# Patient Record
Sex: FEMALE | Race: WHITE | NOT HISPANIC OR LATINO | Employment: PART TIME | URBAN - METROPOLITAN AREA
[De-identification: names, ages, dates, MRNs, and addresses within clinical notes are randomized per-mention and may not be internally consistent; named-entity substitution may affect disease eponyms.]

---

## 2017-04-26 DIAGNOSIS — F41.9 ANXIETY: ICD-10-CM

## 2017-04-26 DIAGNOSIS — F43.23 ADJUSTMENT DISORDER WITH MIXED ANXIETY AND DEPRESSED MOOD: ICD-10-CM

## 2017-04-26 NOTE — TELEPHONE ENCOUNTER
zoloft      Last Written Prescription Date: 11/14/16  Last Fill Quantity: 90, # refills: 1  Last Office Visit with [facility] [place] care provider:  11/14/16        Last PHQ-9 score on record=   PHQ-9 SCORE 11/14/2016   Total Score 0

## 2017-04-27 NOTE — TELEPHONE ENCOUNTER
Need update PHQ-9.     [employer] message sent with questionnaire attached.  Awaiting response    Ann Severino RN

## 2017-04-28 RX ORDER — SERTRALINE HYDROCHLORIDE 25 MG/1
TABLET, FILM COATED ORAL
Qty: 90 TABLET | Refills: 1 | Status: SHIPPED | OUTPATIENT
Start: 2017-04-28 | End: 2017-10-10

## 2017-04-28 NOTE — TELEPHONE ENCOUNTER
PHQ-9 and KALI-7 updated    PHQ-9 SCORE 10/31/2016 11/14/2016 4/27/2017   Total Score - - -   Total Score MyChart - - 0   Total Score 1 0 -     KALI-7 SCORE 10/31/2016 11/14/2016 4/27/2017   Total Score - - -   Total Score - - 1 (minimal anxiety)   Total Score 16 0 -     Refill request approved per [facility] protocol    Ann Severino RN

## 2017-06-01 ENCOUNTER — OFFICE VISIT (OUTPATIENT)
Dept: FAMILY MEDICINE | Facility: CLINIC | Age: 67
End: 2017-06-01
Payer: MEDICARE

## 2017-06-01 VITALS
OXYGEN SATURATION: 100 % | WEIGHT: 136 LBS | DIASTOLIC BLOOD PRESSURE: 68 MMHG | TEMPERATURE: 97.9 F | SYSTOLIC BLOOD PRESSURE: 118 MMHG | BODY MASS INDEX: 25.68 KG/M2 | HEIGHT: 61 IN | HEART RATE: 68 BPM | RESPIRATION RATE: 16 BRPM
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
## 2017-06-01 DIAGNOSIS — M54.50 ACUTE RIGHT-SIDED LOW BACK PAIN WITHOUT SCIATICA: Primary | ICD-10-CM

## 2017-06-01 PROCEDURE — 36415 COLL VENOUS BLD VENIPUNCTURE: CPT | Performed by: FAMILY MEDICINE

## 2017-06-01 PROCEDURE — 99214 OFFICE O/P EST MOD 30 MIN: CPT | Performed by: FAMILY MEDICINE

## 2017-06-01 PROCEDURE — 86618 LYME DISEASE ANTIBODY: CPT | Performed by: FAMILY MEDICINE

## 2017-06-01 RX ORDER — NABUMETONE 500 MG/1
500 TABLET, FILM COATED ORAL 2 TIMES DAILY
Qty: 30 TABLET | Refills: 0 | Status: SHIPPED | OUTPATIENT
Start: 2017-06-01 | End: 2017-08-28

## 2017-06-01 RX ORDER — DOXYCYCLINE 100 MG/1
200 CAPSULE ORAL ONCE
Qty: 2 CAPSULE | Refills: 0 | Status: SHIPPED | OUTPATIENT
Start: 2017-06-01 | End: 2017-06-01
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
## 2017-06-01 NOTE — MR AVS SNAPSHOT
After Visit Summary   6/1/2017    Shelley Mccrary    MRN: [id number]           Patient Information     Date Of Birth          1950        Visit Information        Provider Department      6/1/2017 2:00 PM Fabiola Mahmood MD [facility] [place]        Today's Diagnoses     Acute right-sided low back pain without sciatica    -  1    Tick bite, initial encounter          Care Instructions    Check labs  Take medications as directed.  Cares and symptomatic cares discussed   Follow up if problem or concern             Follow-ups after your visit        Your next 10 appointments already scheduled     Aug 24, 2017  1:00 PM CDT   Ech Complete with [id number]   [facility] CV Echocardiography (Cardiovascular Imaging at [facility])    [address]  [address]  [facility] [id number] [phone]           1.  Please bring or wear a comfortable two-piece outfit. 2.  You may eat, drink and take your normal medicines. 3.  For any questions that cannot be answered, please contact the ordering physician            Aug 28, 2017  9:15 AM CDT   Return Visit with Salty Lovett MD   [facility] AT [place] ([facility])    [address]  [facility] [place] [id number] [phone]              Who to contact     If you have questions or need follow up information about today's clinic visit or your schedule please contact [facility] [place] directly at [phone].  Normal or non-critical lab and imaging results will be communicated to you by MyChart, letter or phone within 4 business days after the clinic has received the results. If you do not hear from us within 7 days, please contact the clinic through MyChart or phone. If you have a critical or abnormal lab result, we will notify you by phone as soon as possible.  Submit refill requests through [employer] or call your pharmacy and they will forward the  "refill request to us. Please allow 3 business days for your refill to be completed.          Additional Information About Your Visit        [employer] Information     [employer] gives you secure access to your electronic health record. If you see a primary care provider, you can also send messages to your care team and make appointments. If you have questions, please call your primary care clinic.  If you do not have a primary care provider, please call [phone] and they will assist you.        Care EveryWhere ID     This is your Care EveryWhere ID. This could be used by other organizations to access your [facility] medical records  [id number]        Your Vitals Were     Pulse Temperature Respirations Height Pulse Oximetry BMI (Body Mass Index)    68 97.9  F (36.6  C) 16 5' 0.75\" (1.543 m) 100% 25.91 kg/m2       Blood Pressure from Last 3 Encounters:   06/01/17 118/68   11/14/16 100/60   10/31/16 124/70    Weight from Last 3 Encounters:   06/01/17 136 lb (61.7 kg)   11/14/16 132 lb 6.4 oz (60.1 kg)   10/31/16 134 lb (60.8 kg)              We Performed the Following     Lyme Disease Elena with reflex to WB Serum          Today's Medication Changes          These changes are accurate as of: 6/1/17  2:33 PM.  If you have any questions, ask your nurse or doctor.               Start taking these medicines.        Dose/Directions    doxycycline 100 MG capsule   Commonly known as:  VIBRAMYCIN   Used for:  Tick bite, initial encounter   Started by:  Fabiola Mahmood MD        Dose:  200 mg   Take 2 capsules (200 mg) by mouth once for 1 dose   Quantity:  2 capsule   Refills:  0       nabumetone 500 MG tablet   Commonly known as:  RELAFEN   Used for:  Acute right-sided low back pain without sciatica   Started by:  Fabiola Mahmood MD        Dose:  500 mg   Take 1 tablet (500 mg) by mouth 2 times daily   Quantity:  30 tablet   Refills:  0         These medicines have changed or have updated prescriptions.        " Dose/Directions    * aspirin 81 MG tablet   This may have changed:  Another medication with the same name was added. Make sure you understand how and when to take each.   Changed by:  Salty Lovett MD        Take by mouth daily   Refills:  0       * aspirin 81 MG tablet   This may have changed:  You were already taking a medication with the same name, and this prescription was added. Make sure you understand how and when to take each.   Changed by:  Fabiola Mahmood MD        Dose:  81 mg   Take 1 tablet (81 mg) by mouth once for 1 dose   Quantity:  1 tablet   Refills:  0       * Notice:  This list has 2 medication(s) that are the same as other medications prescribed for you. Read the directions carefully, and ask your doctor or other care provider to review them with you.         Where to get your medicines      These medications were sent to [id number] - [place], [place] - [address] AT [facility] [address] [facility]  [address] [address], [place] [address]     Phone:  [phone]     doxycycline 100 MG capsule    nabumetone 500 MG tablet         Some of these will need a paper prescription and others can be bought over the counter.  Ask your nurse if you have questions.     You don't need a prescription for these medications     aspirin 81 MG tablet                Primary Care Provider Office Phone # Fax #    Fabiola Mahmood [phone]       [facility] [address] [place] [address] [address]  [place] [address]        Thank you!     Thank you for [facility]  for your care. Our goal is always to provide you with excellent care. Hearing back from our patients is one way we can continue to improve our services. Please take a few minutes to complete the written survey that you may receive in the mail after your visit with us. Thank you!             Your Updated Medication List - Protect others around you: Learn how to  safely use, store and throw away your medicines at www.disposemymeds.org.          This list is accurate as of: 6/1/17  2:33 PM.  Always use your most recent med list.                   Brand Name Dispense Instructions for use    * aspirin 81 MG tablet      Take by mouth daily       * aspirin 81 MG tablet     1 tablet    Take 1 tablet (81 mg) by mouth once for 1 dose       atorvastatin 40 MG tablet    LIPITOR    90 tablet    TAKE 1 TABLET BY MOUTH DAILY       clobetasol 0.05 % cream    TEMOVATE         doxycycline 100 MG capsule    VIBRAMYCIN    2 capsule    Take 2 capsules (200 mg) by mouth once for 1 dose       MELATONIN PO      Take 1 mg by mouth At Bedtime       METOPROLOL SUCCINATE ER PO      Take 25 mg by mouth daily       MULTI-VITAMINS Tabs      Take 1 tablet by mouth       nabumetone 500 MG tablet    RELAFEN    30 tablet    Take 1 tablet (500 mg) by mouth 2 times daily       nitroglycerin 0.4 MG sublingual tablet    NITROSTAT    25 tablet    Place 1 tablet (0.4 mg) under the tongue every 5 minutes as needed for chest pain If you are still having symptoms after 3 doses (15 minutes) call 911.       OMEGA-3 FISH OIL PO      Take 2 g by mouth daily       sertraline 25 MG tablet    ZOLOFT    90 tablet    TAKE 1 TABLET(25 MG) BY MOUTH DAILY       * Notice:  This list has 2 medication(s) that are the same as other medications prescribed for you. Read the directions carefully, and ask your doctor or other care provider to review them with you.

## 2017-06-01 NOTE — LETTER
My Depression Action Plan  Name: Shelley Mccrary   Date of Birth 1950  Date: 6/8/2017    My doctor: Fabiola Mahmood   My clinic: [address] [address]  [phone]          GREEN    ZONE   Good Control    What it looks like:     Things are going generally well. You have normal up s and down s. You may even feel depressed from time to time, but bad moods usually last less than a day.   What you need to do:  1. Continue to care for yourself (see self care plan)  2. Check your depression survival kit and update it as needed  3. Follow your physician s recommendations including any medication.  4. Do not stop taking medication unless you consult with your physician first.           YELLOW         ZONE Getting Worse    What it looks like:     Depression is starting to interfere with your life.     It may be hard to get out of bed; you may be starting to isolate yourself from others.    Symptoms of depression are starting to last most all day and this has happened for several days.     You may have suicidal thoughts but they are not constant.   What you need to do:     1. Call your care team, your response to treatment will improve if you keep your care team informed of your progress. Yellow periods are signs an adjustment may need to be made.     2. Continue your self-care, even if you have to fake it!    3. Talk to someone in your support network    4. Open up your depression survival kit           RED    ZONE Medical Alert - Get Help    What it looks like:     Depression is seriously interfering with your life.     You may experience these or other symptoms: You can t get out of bed most days, can t work or engage in other necessary activities, you have trouble taking care of basic hygiene, or basic responsibilities, thoughts of suicide or death that will not go away, self-injurious behavior.     What you need to do:  1. Call your  care team and request a same-day appointment. If they are not available (weekends or after hours) call your local crisis line, emergency room or 911.      Electronically signed by: Fabiola Mahmood, June 8, 2017    Depression Self Care Plan / Survival Kit    Self-Care for Depression  Here s the deal. Your body and mind are really not as separate as most people think.  What you do and think affects how you feel and how you feel influences what you do and think. This means if you do things that people who feel good do, it will help you feel better.  Sometimes this is all it takes.  There is also a place for medication and therapy depending on how severe your depression is, so be sure to consult with your medical provider and/ or Behavioral Health Consultant if your symptoms are worsening or not improving.     In order to better manage my stress, I will:    Exercise  Get some form of exercise, every day. This will help reduce pain and release endorphins, the  feel good  chemicals in your brain. This is almost as good as taking antidepressants!  This is not the same as joining a gym and then never going! (they count on that by the way ) It can be as simple as just going for a walk or doing some gardening, anything that will get you moving.      Hygiene   Maintain good hygiene (Get out of bed in the morning, Make your bed, Brush your teeth, Take a shower, and Get dressed like you were going to work, even if you are unemployed).  If your clothes don't fit try to get ones that do.    Diet  I will strive to eat foods that are good for me, drink plenty of water, and avoid excessive sugar, caffeine, alcohol, and other mood-altering substances.  Some foods that are helpful in depression are: complex carbohydrates, B vitamins, flaxseed, fish or fish oil, fresh fruits and vegetables.    Psychotherapy  I agree to participate in Individual Therapy (if recommended).    Medication  If prescribed medications, I agree to take them.   Missing doses can result in serious side effects.  I understand that drinking alcohol, or other illicit drug use, may cause potential side effects.  I will not stop my medication abruptly without first discussing it with my provider.    Staying Connected With Others  I will stay in touch with my friends, family members, and my primary care provider/team.    Use your imagination  Be creative.  We all have a creative side; it doesn t matter if it s oil painting, sand castles, or mud pies! This will also kick up the endorphins.    Witness Beauty  (AKA stop and smell the roses) Take a look outside, even in mid-winter. Notice colors, textures. Watch the squirrels and birds.     Service to others  Be of service to others.  There is always someone else in need.  By helping others we can  get out of ourselves  and remember the really important things.  This also provides opportunities for practicing all the other parts of the program.    Humor  Laugh and be silly!  Adjust your TV habits for less news and crime-drama and more comedy.    Control your stress  Try breathing deep, massage therapy, biofeedback, and meditation. Find time to relax each day.     My support system    Clinic Contact:  Phone number:    Contact 1:  Phone number:    Contact 2:  Phone number:    Congregation/:  Phone number:    Therapist:  Phone number:    Local crisis center:    Phone number:    Other community support:  Phone number:

## 2017-06-02 LAB — B BURGDOR IGG+IGM SER QL: 0.09 (ref 0–0.89)

## 2017-06-16 ENCOUNTER — TELEPHONE (OUTPATIENT)
Dept: LAB | Facility: CLINIC | Age: 67
End: 2017-06-16

## 2017-06-16 NOTE — TELEPHONE ENCOUNTER
Call from [facility]. Pt out of town and out of metoprolol XL 25 mg tablet. Gave pharmacy ok for 5 tablets. JUAN ALBERTO Oscar RN

## 2017-08-07 DIAGNOSIS — I25.10 CAD (CORONARY ARTERY DISEASE): ICD-10-CM

## 2017-08-07 DIAGNOSIS — I42.9 CARDIOMYOPATHY, UNSPECIFIED (H): Primary | ICD-10-CM

## 2017-08-07 RX ORDER — METOPROLOL SUCCINATE 25 MG/1
25 TABLET, EXTENDED RELEASE ORAL DAILY
Qty: 90 TABLET | Refills: 0 | Status: SHIPPED | OUTPATIENT
Start: 2017-08-07 | End: 2017-08-28

## 2017-08-24 ENCOUNTER — HOSPITAL ENCOUNTER (OUTPATIENT)
Dept: CARDIOLOGY | Facility: CLINIC | Age: 67
Discharge: HOME | End: 2017-08-24
Attending: INTERNAL MEDICINE | Admitting: INTERNAL MEDICINE
Payer: MEDICARE

## 2017-08-24 DIAGNOSIS — I25.5 ISCHEMIC CARDIOMYOPATHY: ICD-10-CM

## 2017-08-24 DIAGNOSIS — I25.10 CORONARY ARTERY DISEASE INVOLVING NATIVE CORONARY ARTERY OF NATIVE HEART WITHOUT ANGINA PECTORIS: ICD-10-CM

## 2017-08-24 PROCEDURE — 93306 TTE W/DOPPLER COMPLETE: CPT

## 2017-08-24 PROCEDURE — 93306 TTE W/DOPPLER COMPLETE: CPT | Mod: 26 | Performed by: INTERNAL MEDICINE

## 2017-08-28 ENCOUNTER — OFFICE VISIT (OUTPATIENT)
Dept: CARDIOLOGY | Facility: CLINIC | Age: 67
End: 2017-08-28
Attending: INTERNAL MEDICINE
Payer: MEDICARE

## 2017-08-28 VITALS
SYSTOLIC BLOOD PRESSURE: 136 MMHG | DIASTOLIC BLOOD PRESSURE: 64 MMHG | HEART RATE: 86 BPM | HEIGHT: 61 IN | BODY MASS INDEX: 25.86 KG/M2 | WEIGHT: 137 LBS

## 2017-08-28 DIAGNOSIS — I42.9 CARDIOMYOPATHY, UNSPECIFIED (H): ICD-10-CM

## 2017-08-28 DIAGNOSIS — I25.10 CORONARY ARTERY DISEASE INVOLVING NATIVE CORONARY ARTERY OF NATIVE HEART WITHOUT ANGINA PECTORIS: Primary | ICD-10-CM

## 2017-08-28 DIAGNOSIS — I25.5 ISCHEMIC CARDIOMYOPATHY: ICD-10-CM

## 2017-08-28 PROCEDURE — 99214 OFFICE O/P EST MOD 30 MIN: CPT | Performed by: INTERNAL MEDICINE

## 2017-08-28 RX ORDER — METOPROLOL SUCCINATE 25 MG/1
25 TABLET, EXTENDED RELEASE ORAL DAILY
Qty: 90 TABLET | Refills: 3 | Status: SHIPPED | OUTPATIENT
Start: 2017-08-28 | End: 2018-10-23

## 2017-08-28 NOTE — PROGRESS NOTES
HPI and Plan:   I had the pleasure of seeing Shelley Mccrary in Cardiology Clinic in followup.  She is a pleasant 66-year-old female with past medical history of inferolateral myocardial infarction in July 2015 when she was admitted to [facility] and had RCA intervention.  At that time, posterolateral branch with angioplasty was also performed.  She is doing quite well.  She exercises daily.  She also lifts weights.  She is also following a low cholesterol, low-salt diet religiously.  I am quite impressed by her lifestyle modification.          She is on metoprolol XL at 25 daily.   Previously, her blood pressures are low and therefore, we had to stop her lisinopril.  Si She remains on aspirin.       Last lipid profile in November showed an LDL of 60, HDL 53.   HDL is improved compared to prior from 43-53. She is on atorvastatin.     She denies any chest pain or shortness of breath. She is quite active. She jogs in including jogging up the hill. However, she's been quite normal nervous all summer and anxious in anticipation of this visit. She was worried that her echo findings will not be good for her. I reassured her that her echo shows no normal LV systolic function with mild inferior hypokinesis. Compared to prior study, the ejection fraction has improved. I also congratulated her on her ability to exercise regularly and eat healthy. She is relieved.    PHYSICAL EXAMINATION:   Given the anxiety, initial blood pressure was 156 x 85. At the end of the visit, I rechecked her blood pressure improved to 136 x 64. She tells me that when she checks her blood pressure at [facility], the blood pressures are much lower.  CARDIOPULMONARY EXAMINATION:  Unremarkable.       IMPRESSION AND PLAN:   1.  Coronary artery disease, status post inferior wall myocardial infarction in 07/2015.  She is now free of chest pain and shortness of breath. Her echo shows improvement in ejection fraction of 55-60%. She has no evidence of  congestive heart failure.   I refilled her metoprolol today at the same    2. Hyperlipidemia  Improved on atorvastatin 40 mg daily and with lifestyle modification. HDL now improved to the best she's had in years at 53.      She will return to see me on an annual basis.       She will call me if there is worsening chest pain or shortness of breath.       cc:   Fabiola Mahmood MD   [address]  [address]           LAINEY QUISPE MD      Orders Placed This Encounter   Procedures     Follow-Up with Cardiologist       Orders Placed This Encounter   Medications     metoprolol (TOPROL-XL) 25 MG 24 hr tablet     Sig: Take 1 tablet (25 mg) by mouth daily     Dispense:  90 tablet     Refill:  3       Medications Discontinued During This Encounter   Medication Reason     nabumetone (RELAFEN) 500 MG tablet Therapy completed     metoprolol (TOPROL-XL) 25 MG 24 hr tablet Reorder         Encounter Diagnoses   Name Primary?     Coronary artery disease involving native coronary artery of native heart without angina pectoris Yes     Ischemic cardiomyopathy      Cardiomyopathy, unspecified (H)        CURRENT MEDICATIONS:  Current Outpatient Prescriptions   Medication Sig Dispense Refill     metoprolol (TOPROL-XL) 25 MG 24 hr tablet Take 1 tablet (25 mg) by mouth daily 90 tablet 3     sertraline (ZOLOFT) 25 MG tablet TAKE 1 TABLET(25 MG) BY MOUTH DAILY 90 tablet 1     atorvastatin (LIPITOR) 40 MG tablet TAKE 1 TABLET BY MOUTH DAILY 90 tablet 3     Omega-3 Fatty Acids (OMEGA-3 FISH OIL PO) Take 2 g by mouth daily       MELATONIN PO Take 1 mg by mouth At Bedtime       Multiple Vitamin (MULTI-VITAMINS) TABS Take 1 tablet by mouth       clobetasol (TEMOVATE) 0.05 % cream   2     aspirin 81 MG tablet Take by mouth daily       nitroglycerin (NITROSTAT) 0.4 MG SL tablet Place 1 tablet (0.4 mg) under the tongue every 5 minutes as needed for chest pain If you are still having symptoms  after 3 doses (15 minutes) call 911. 25 tablet 3     [DISCONTINUED] metoprolol (TOPROL-XL) 25 MG 24 hr tablet Take 1 tablet (25 mg) by mouth daily 90 tablet 0       ALLERGIES     Allergies   Allergen Reactions     Hmg-Coa-R Inhibitors Other (See Comments)     elevated liver enzymes       PAST MEDICAL HISTORY:  Past Medical History:   Diagnosis Date     Anxiety     post MI     CAD (coronary artery disease) 7/29/2015     Depression     post MI     MRSA infection     hand      Myocardial infarction (H) 7/2015     Vertigo     related miners        PAST SURGICAL HISTORY:  Past Surgical History:   Procedure Laterality Date     ANGIOPLASTY  7/17/2015    MAURISIO to mid circ, thrombectomy -MAURISIO to proximal, mid, distal RCA , successful balloon to 1st RPL done Resolute stents used procedure done at [facility]     INNER EAR SURGERY      redesign merritt faye of miners ismael        FAMILY HISTORY:  Family History   Problem Relation Age of Onset     Coronary Artery Disease Father      at age 39, and other heart problem      Hyperlipidemia Father      ?      Hypertension Mother      DIABETES No family hx of      CEREBROVASCULAR DISEASE No family hx of      Breast Cancer No family hx of      Colon Cancer No family hx of        SOCIAL HISTORY:  Social History     Social History     Marital status:      Spouse name: N/A     Number of children: N/A     Years of education: N/A     Social History Main Topics     Smoking status: Former Smoker     Types: Cigarettes     Quit date: 1/1/1990     Smokeless tobacco: Never Used      Comment: smoked 1-2 pack week, 30 years     Alcohol use No     Drug use: No     Sexual activity: No     Other Topics Concern     Parent/Sibling W/ Cabg, Mi Or Angioplasty Before 65f 55m? Yes     Caffeine Concern No     Sleep Concern No     Weight Concern No     Special Diet Yes     low fats     Exercise Yes     walking  miles day, swimming, ellyptical     Seat Belt Yes     Social History Narrative     ", 2 kids, non smoker quit 22 yrs ago. No alcohol social occasional , works for a company to help elderly patient        Review of Systems:  Skin:  Positive for bruising     Eyes:  Positive for glasses    ENT:  Negative      Respiratory:  Negative       Cardiovascular:  Negative      Gastroenterology: Negative      Genitourinary:  Positive for nocturia    Musculoskeletal:  Negative      Neurologic:  Positive for numbness or tingling of hands    Psychiatric:  Negative      Heme/Lymph/Imm:  Positive for easy bruising    Endocrine:  Negative        Physical Exam:  Vitals: /64  Pulse 86  Ht 1.543 m (5' 0.75\")  Wt 62.1 kg (137 lb)  BMI 26.1 kg/m2    Constitutional:  cooperative;alert and oriented        Skin:  warm and dry to the touch        Head:  no masses or lesions        Eyes:  pupils equal and round        ENT:           Neck:  carotid pulses are full and equal bilaterally        Chest:  clear to auscultation          Cardiac: normal S1 and S2     no presence of murmur            Abdomen:  no masses;BS normoactive        Vascular: not assessed this visit                                        Extremities and Back:  no edema              Neurological:  no gross motor deficits                Salty Lovett MD  [address]  [place] [id number]              "

## 2017-08-28 NOTE — MR AVS SNAPSHOT
After Visit Summary   8/28/2017    Shelley Mccrary    MRN: [id number]           Patient Information     Date Of Birth          1950        Visit Information        Provider Department      8/28/2017 9:15 AM Salty Lovett MD [facility] [place]        Today's Diagnoses     Coronary artery disease involving native coronary artery of native heart without angina pectoris    -  1    Ischemic cardiomyopathy        Cardiomyopathy, unspecified (H)           Follow-ups after your visit        Additional Services     Follow-Up with Cardiologist                 Future tests that were ordered for you today     Open Future Orders        Priority Expected Expires Ordered    Follow-Up with Cardiologist Routine 8/28/2018 8/29/2018 8/28/2017            Who to contact     If you have questions or need follow up information about today's clinic visit or your schedule please contact [facility] directly at [phone].  Normal or non-critical lab and imaging results will be communicated to you by MyChart, letter or phone within 4 business days after the clinic has received the results. If you do not hear from us within 7 days, please contact the clinic through Your Tribute[facility] or phone. If you have a critical or abnormal lab result, we will notify you by phone as soon as possible.  Submit refill requests through oNoise or call your pharmacy and they will forward the refill request to us. Please allow 3 business days for your refill to be completed.          Additional Information About Your Visit        MyChart Information     oNoise gives you secure access to your electronic health record. If you see a primary care provider, you can also send messages to your care team and make appointments. If you have questions, please call your primary care clinic.  If you do not have a primary care provider, please call [phone] and they will  "assist you.        Care EveryWhere ID     This is your Care EveryWhere ID. This could be used by other organizations to access your [place] [place] medical records  [id number]        Your Vitals Were     Pulse Height BMI (Body Mass Index)             86 1.543 m (5' 0.75\") 26.1 kg/m2          Blood Pressure from Last 3 Encounters:   08/28/17 136/64   06/01/17 118/68   11/14/16 100/60    Weight from Last 3 Encounters:   08/28/17 62.1 kg (137 lb)   06/01/17 61.7 kg (136 lb)   11/14/16 60.1 kg (132 lb 6.4 oz)              We Performed the Following     Follow-Up with Cardiologist          Where to get your medicines      These medications were sent to [id number] - [place], [place] - [address] AT [facility] OF [facility] [address]  [address] [facility], [place] [address]     Phone:  [phone]     metoprolol 25 MG 24 hr tablet          Primary Care Provider Office Phone # Fax #    Fabiola Mahmood [phone] [address]  [place] [address]        Equal Access to Services     VAIBHAV GRIJALVA AH: Hadii aad ku hadasho Soomaali, waaxda luqadaha, qaybta kaalmada adeegyada, waxay idiin hayjanetn abrahan khagustín laselene ah. So [facility] [phone].    ATENCIÓN: Si habla español, tiene a hylton disposición servicios gratuitos de asistencia lingüística. Llame al [phone].    We comply with applicable federal civil rights laws and [place] laws. We do not discriminate on the basis of race, color, national origin, age, disability sex, sexual orientation or gender identity.            Thank you!     Thank you for choosing [facility]  for your care. Our goal is always to provide you with excellent care. Hearing back from our patients is one way we can continue to improve our services. Please take a few minutes to complete the written survey that you may receive in the mail after your visit with us. Thank you!             Your Updated " Medication List - Protect others around you: Learn how to safely use, store and throw away your medicines at www.disposemymeds.org.          This list is accurate as of: 8/28/17  9:46 AM.  Always use your most recent med list.                   Brand Name Dispense Instructions for use Diagnosis    aspirin 81 MG tablet      Take by mouth daily        atorvastatin 40 MG tablet    LIPITOR    90 tablet    TAKE 1 TABLET BY MOUTH DAILY    Hyperlipidemia LDL goal <70       clobetasol 0.05 % cream    TEMOVATE          MELATONIN PO      Take 1 mg by mouth At Bedtime        metoprolol 25 MG 24 hr tablet    TOPROL-XL    90 tablet    Take 1 tablet (25 mg) by mouth daily    Cardiomyopathy, unspecified (H)       MULTI-VITAMINS Tabs      Take 1 tablet by mouth        nitroGLYcerin 0.4 MG sublingual tablet    NITROSTAT    25 tablet    Place 1 tablet (0.4 mg) under the tongue every 5 minutes as needed for chest pain If you are still having symptoms after 3 doses (15 minutes) call 911.    CAD (coronary artery disease), S/P angioplasty with stent       OMEGA-3 FISH OIL PO      Take 2 g by mouth daily        sertraline 25 MG tablet    ZOLOFT    90 tablet    TAKE 1 TABLET(25 MG) BY MOUTH DAILY    Anxiety, Adjustment disorder with mixed anxiety and depressed mood

## 2017-08-28 NOTE — LETTER
8/28/2017    Fabiola Mahmood MD  [address]  [place] [address]    RE: Shelley Mccrary       Dear Colleague,    I had the pleasure of seeing Shelley Mccrary in the [facility].    HPI and Plan:   I had the pleasure of seeing Shelley Mccrary in Cardiology Clinic in followup.  She is a pleasant 66-year-old female with past medical history of inferolateral myocardial infarction in July 2015 when she was admitted to [facility] and had RCA intervention.  At that time, posterolateral branch with angioplasty was also performed.  She is doing quite well.  She exercises daily.  She also lifts weights.  She is also following a low cholesterol, low-salt diet religiously.  I am quite impressed by her lifestyle modification.          She is on metoprolol XL at 25 daily.   Previously, her blood pressures are low and therefore, we had to stop her lisinopril.  Si She remains on aspirin.       Last lipid profile in November showed an LDL of 60, HDL 53.    HDL is improved compared to prior from 43-53. She is on atorvastatin.     She denies any chest pain or shortness of breath. She is quite active. She jogs in including jogging up the hill. However, she's been quite normal nervous all summer and anxious in anticipation of this visit. She was worried that her echo findings will not be good for her. I reassured her that her echo shows no normal LV systolic function with mild inferior hypokinesis. Compared to prior study, the ejection fraction has improved. I also congratulated her on her ability to exercise regularly and eat healthy. She is relieved.    PHYSICAL EXAMINATION:   Given the anxiety, initial blood pressure was 156 x 85. At the end of the visit, I rechecked her blood pressure improved to 136 x 64. She tells me that when she checks her blood pressure at [facility], the blood pressures are much lower.  CARDIOPULMONARY EXAMINATION:  Unremarkable.       IMPRESSION AND PLAN:    1.  Coronary artery disease, status post inferior wall myocardial infarction in 07/2015.  She is now free of chest pain and shortness of breath. Her echo shows improvement in ejection fraction of 55-60%. She has no evidence of congestive heart failure.    I refilled her metoprolol today at the same    2. Hyperlipidemia  Improved on atorvastatin 40 mg daily and with lifestyle modification. HDL now improved to the best she's had in years at 53.      She will return to see me on an annual basis.       She will call me if there is worsening chest pain or shortness of breath.       cc:   Fabiola Mahmood MD   [address]  [address]           LAINEY QUISPE MD      Orders Placed This Encounter   Procedures     Follow-Up with Cardiologist       Orders Placed This Encounter   Medications     metoprolol (TOPROL-XL) 25 MG 24 hr tablet     Sig: Take 1 tablet (25 mg) by mouth daily     Dispense:  90 tablet     Refill:  3       Medications Discontinued During This Encounter   Medication Reason     nabumetone (RELAFEN) 500 MG tablet Therapy completed     metoprolol (TOPROL-XL) 25 MG 24 hr tablet Reorder         Encounter Diagnoses   Name Primary?     Coronary artery disease involving native coronary artery of native heart without angina pectoris Yes     Ischemic cardiomyopathy      Cardiomyopathy, unspecified (H)        CURRENT MEDICATIONS:  Current Outpatient Prescriptions   Medication Sig Dispense Refill     metoprolol (TOPROL-XL) 25 MG 24 hr tablet Take 1 tablet (25 mg) by mouth daily 90 tablet 3     sertraline (ZOLOFT) 25 MG tablet TAKE 1 TABLET(25 MG) BY MOUTH DAILY 90 tablet 1     atorvastatin (LIPITOR) 40 MG tablet TAKE 1 TABLET BY MOUTH DAILY 90 tablet 3     Omega-3 Fatty Acids (OMEGA-3 FISH OIL PO) Take 2 g by mouth daily       MELATONIN PO Take 1 mg by mouth At Bedtime       Multiple Vitamin (MULTI-VITAMINS) TABS Take 1 tablet by mouth       clobetasol  (TEMOVATE) 0.05 % cream   2     aspirin 81 MG tablet Take by mouth daily       nitroglycerin (NITROSTAT) 0.4 MG SL tablet Place 1 tablet (0.4 mg) under the tongue every 5 minutes as needed for chest pain If you are still having symptoms after 3 doses (15 minutes) call 911. 25 tablet 3     [DISCONTINUED] metoprolol (TOPROL-XL) 25 MG 24 hr tablet Take 1 tablet (25 mg) by mouth daily 90 tablet 0       ALLERGIES     Allergies   Allergen Reactions     Hmg-Coa-R Inhibitors Other (See Comments)     elevated liver enzymes       PAST MEDICAL HISTORY:  Past Medical History:   Diagnosis Date     Anxiety     post MI     CAD (coronary artery disease) 7/29/2015     Depression     post MI     MRSA infection     hand      Myocardial infarction (H) 7/2015     Vertigo     related miners        PAST SURGICAL HISTORY:  Past Surgical History:   Procedure Laterality Date     ANGIOPLASTY  7/17/2015    MAURISIO to mid circ, thrombectomy -MAURISIO to proximal, mid, distal RCA , successful balloon to 1st RPL done Resolute stents used procedure done at [facility]     INNER EAR SURGERY      redesign roxann nieto bc of miners diseas        FAMILY HISTORY:  Family History   Problem Relation Age of Onset     Coronary Artery Disease Father      at age 39, and other heart problem      Hyperlipidemia Father      ?      Hypertension Mother      DIABETES No family hx of      CEREBROVASCULAR DISEASE No family hx of      Breast Cancer No family hx of      Colon Cancer No family hx of        SOCIAL HISTORY:  Social History     Social History     Marital status:      Spouse name: N/A     Number of children: N/A     Years of education: N/A     Social History Main Topics     Smoking status: Former Smoker     Types: Cigarettes     Quit date: 1/1/1990     Smokeless tobacco: Never Used      Comment: smoked 1-2 pack week, 30 years     Alcohol use No     Drug use: No     Sexual activity: No     Other Topics Concern     Parent/Sibling W/ Cabg, Mi Or Angioplasty  "Before 65f 55m? Yes     Caffeine Concern No     Sleep Concern No     Weight Concern No     Special Diet Yes     low fats     Exercise Yes     walking  miles day, swimming, ellyptical     Seat Belt Yes     Social History Narrative    , 2 kids, non smoker quit 22 yrs ago. No alcohol social occasional , works for a company to help elderly patient        Review of Systems:  Skin:  Positive for bruising     Eyes:  Positive for glasses    ENT:  Negative      Respiratory:  Negative       Cardiovascular:  Negative      Gastroenterology: Negative      Genitourinary:  Positive for nocturia    Musculoskeletal:  Negative      Neurologic:  Positive for numbness or tingling of hands    Psychiatric:  Negative      Heme/Lymph/Imm:  Positive for easy bruising    Endocrine:  Negative        Physical Exam:  Vitals: /64  Pulse 86  Ht 1.543 m (5' 0.75\")  Wt 62.1 kg (137 lb)  BMI 26.1 kg/m2    Constitutional:  cooperative;alert and oriented        Skin:  warm and dry to the touch        Head:  no masses or lesions        Eyes:  pupils equal and round        ENT:           Neck:  carotid pulses are full and equal bilaterally        Chest:  clear to auscultation          Cardiac: normal S1 and S2     no presence of murmur            Abdomen:  no masses;BS normoactive        Vascular: not assessed this visit                                        Extremities and Back:  no edema              Neurological:  no gross motor deficits        Thank you for allowing me to participate in the care of your patient.    Sincerely,     Salty Lovett MD     [facility]      "

## 2017-10-10 ENCOUNTER — OFFICE VISIT (OUTPATIENT)
Dept: FAMILY MEDICINE | Facility: CLINIC | Age: 67
End: 2017-10-10
Payer: MEDICARE

## 2017-10-10 VITALS
DIASTOLIC BLOOD PRESSURE: 77 MMHG | TEMPERATURE: 97.5 F | HEART RATE: 67 BPM | WEIGHT: 137 LBS | OXYGEN SATURATION: 9 % | BODY MASS INDEX: 25.86 KG/M2 | HEIGHT: 61 IN | SYSTOLIC BLOOD PRESSURE: 118 MMHG

## 2017-10-10 DIAGNOSIS — F43.23 ADJUSTMENT DISORDER WITH MIXED ANXIETY AND DEPRESSED MOOD: ICD-10-CM

## 2017-10-10 DIAGNOSIS — E78.5 HYPERLIPIDEMIA LDL GOAL <70: ICD-10-CM

## 2017-10-10 DIAGNOSIS — M79.672 LEFT FOOT PAIN: ICD-10-CM

## 2017-10-10 DIAGNOSIS — L98.9 SKIN LESION: ICD-10-CM

## 2017-10-10 DIAGNOSIS — Z23 NEED FOR PROPHYLACTIC VACCINATION AND INOCULATION AGAINST INFLUENZA: ICD-10-CM

## 2017-10-10 DIAGNOSIS — M54.50 MIDLINE LOW BACK PAIN WITHOUT SCIATICA, UNSPECIFIED CHRONICITY: Primary | ICD-10-CM

## 2017-10-10 PROCEDURE — 90662 IIV NO PRSV INCREASED AG IM: CPT | Performed by: FAMILY MEDICINE

## 2017-10-10 PROCEDURE — 80061 LIPID PANEL: CPT | Performed by: FAMILY MEDICINE

## 2017-10-10 PROCEDURE — 99214 OFFICE O/P EST MOD 30 MIN: CPT | Mod: 25 | Performed by: FAMILY MEDICINE

## 2017-10-10 PROCEDURE — 80053 COMPREHEN METABOLIC PANEL: CPT | Performed by: FAMILY MEDICINE

## 2017-10-10 PROCEDURE — G0008 ADMIN INFLUENZA VIRUS VAC: HCPCS | Performed by: FAMILY MEDICINE

## 2017-10-10 PROCEDURE — 36415 COLL VENOUS BLD VENIPUNCTURE: CPT | Performed by: FAMILY MEDICINE

## 2017-10-10 RX ORDER — NABUMETONE 500 MG/1
500-1000 TABLET, FILM COATED ORAL 2 TIMES DAILY PRN
Qty: 30 TABLET | Refills: 0 | Status: SHIPPED | OUTPATIENT
Start: 2017-10-10 | End: 2017-12-07

## 2017-10-10 RX ORDER — SERTRALINE HYDROCHLORIDE 25 MG/1
25 TABLET, FILM COATED ORAL DAILY
Qty: 90 TABLET | Refills: 0 | Status: SHIPPED | OUTPATIENT
Start: 2017-10-10 | End: 2018-02-03

## 2017-10-10 NOTE — PROGRESS NOTES
"  SUBJECTIVE:   Shelley Mccrary is a 67 year old female who presents to clinic today for the following health issues:      Back Pain       Duration: recheck from OV 6/1/17      Specific cause: since last yr. She was better , although it can flare up on and off, lately it is sort of bothering her     Description:   Location of pain: low back right  Character of pain: burning, annoying achy and holds her back although not sharp pain   Pain radiation:radiates into the right buttocks, radiates into the right leg and thigh , sometimes has on left too ,hip feels tight , no radiating pain below . Legs week and achy .New numbness or weakness in legs, not attributed to pain:  no     Intensity: , mild, annoying     History:   Pain interferes with job: YES,   History of back problems: no prior back problems  Any previous MRI or X-rays: None  Sees a specialist for back pain:  No  Therapies tried without relief:     Alleviating factors:   Improved by:       Precipitating factors:  Worsened by: Nothing    PROBLEMS TO ADD ON...            Also  pain on the  left foot  .  Has ongoing left foot pain on and off since last yr , no recall of injury although sometimes she is  down on her feet and on ground,  doing lot of cleaning etc,  so she thinsk she may strained it  from doing some cleaning last week                  Has spot on her chest since summer, feels tender,\" like pimple that never goes away and does not heal\"sometimes it looks slightly red.                    She is also fasting today so wants labs                 She is doing quiet well mood wise and debating If she can go off Zoloft now. Had seen cardiologist and she was told her heart is really good, so she thinks taht also has  reduced her anxiety now . No problem taking med's       Problem list and histories reviewed & adjusted, as indicated.  Additional history: as documented    Patient Active Problem List   Diagnosis     CAD (coronary artery disease)     " "Hyperlipidemia LDL goal <70     Disturbance of skin sensation     Elevated LFTs     Adjustment disorder with mixed anxiety and depressed mood     Myocardial infarction     Ischemic cardiomyopathy     Palpitations     Acute myocardial infarction of other inferior wall, initial episode of care     Anxiety     Coronary artery disease involving native coronary artery of native heart without angina pectoris     Tinnitus, bilateral     Gastroesophageal reflux disease, esophagitis presence not specified     Cardiomyopathy, unspecified     Past Surgical History:   Procedure Laterality Date     ANGIOPLASTY  7/17/2015    MAURISIO to mid circ, thrombectomy -MAURISIO to proximal, mid, distal RCA , successful balloon to 1st RPL done Resolute stents used procedure done at [facility]     INNER EAR SURGERY      redesign roxann nieto, bc of miners diseas        Social History   Substance Use Topics     Smoking status: Former Smoker     Types: Cigarettes     Quit date: 1/1/1990     Smokeless tobacco: Never Used      Comment: smoked 1-2 pack week, 30 years     Alcohol use No     Family History   Problem Relation Age of Onset     Coronary Artery Disease Father      at age 39, and other heart problem      Hyperlipidemia Father      ?      Hypertension Mother      DIABETES No family hx of      CEREBROVASCULAR DISEASE No family hx of      Breast Cancer No family hx of      Colon Cancer No family hx of              Reviewed and updated as needed this visit by clinical staff       Reviewed and updated as needed this visit by Provider         ROS:  Constitutional, HEENT, cardiovascular, pulmonary, GI, , musculoskeletal, neuro, skin, endocrine and psych systems are negative, except as otherwise noted.      OBJECTIVE:   /77  Pulse 67  Temp 97.5  F (36.4  C) (Tympanic)  Ht 5' 0.75\" (1.543 m)  Wt 137 lb (62.1 kg)  SpO2 (!) 9%  BMI 26.1 kg/m2  Body mass index is 26.1 kg/(m^2).  GENERAL: healthy, alert and no distress  RESP: lungs clear to " auscultation - no rales, rhonchi or wheezes  CV: regular rate and rhythm, normal S1 S2, no S3 or S4,   ABDOMEN: soft, nontender, no hepatosplenomegaly, no masses and bowel sounds normal  MS: back with slight decrease ROM, tender mostly in para lumber and gluteal area , bilateral  more so on left , ROM of left hip is ok . MS, BSRL is ok.   Left foot with no swelling or erythema but has localized tenderness mid foot a little above medial arch   SKIN: upper chest wall has rough erythematous 4-5  mm rough slightly raised spot , feels slightly tender   PSYCH: mentation appears normal, affect normal/bright      ASSESSMENT/PLAN:         (M54.5) Midline low back pain without sciatica, unspecified chronicity  (primary encounter diagnosis)  Comment:   Plan: [facility] PT, HAND, AND CHIROPRACTIC REFERRAL,         nabumetone (RELAFEN) 500 MG tablet             discussed back  cares and symptomatic treatment including  adequate pain control, heat,  stretches etc.    she will do follow up if no improvement or problem after PT . Consider further evaluation  if needed.       (M79.672) Left foot pain  Comment:   Plan: nabumetone (RELAFEN) 500 MG tablet            (E78.5) Hyperlipidemia LDL goal <70  Comment:   Plan: Comprehensive metabolic panel, Lipid panel         reflex to direct LDL              (F43.23) Adjustment disorder with mixed anxiety and depressed mood  Comment: doing well., debating to go off, she will consider slow taper if needed   Plan: sertraline (ZOLOFT) 25 MG tablet          (L98.9) Skin lesion  Comment: non healing skin lesion on chest wall   Plan: SKIN CARE REFERRAL              (Z23) Need for prophylactic vaccination and inoculation against influenza  Comment:   Plan: FLU VACCINE, INCREASED ANTIGEN, PRESV FREE, AGE        65+ [36507], Vaccine Administration, Initial         [40101]            Patient expressed understanding and agreement with treatment plan. All patient's questions were answered, will let me know if  has more later.  Medications: Rx's: Reviewed the potential side effects/complications of medications prescribed.       Fabiola Mahmood MD  [facility]

## 2017-10-10 NOTE — PATIENT INSTRUCTIONS
Take medications as directed.  Check labs   Cares and symptomatic cares discussed   Follow up if problem or concern

## 2017-10-10 NOTE — PROGRESS NOTES
Injectable Influenza Immunization Documentation    1.  Is the person to be vaccinated sick today?   No    2. Does the person to be vaccinated have an allergy to a component   of the vaccine?   No    3. Has the person to be vaccinated ever had a serious reaction   to influenza vaccine in the past?   No    4. Has the person to be vaccinated ever had Guillain-Barré syndrome?   No    Form completed by

## 2017-10-10 NOTE — MR AVS SNAPSHOT
After Visit Summary   10/10/2017    Shelley Mccrary    MRN: [id number]           Patient Information     Date Of Birth          1950        Visit Information        Provider Department      10/10/2017 10:40 AM Fabiola Mahmood MD [facility] [place]        Today's Diagnoses     Midline low back pain without sciatica, unspecified chronicity    -  1    Anxiety        Adjustment disorder with mixed anxiety and depressed mood        Left foot pain        Hyperlipidemia LDL goal <70          Care Instructions    Take medications as directed.  Check labs   Cares and symptomatic cares discussed   Follow up if problem or concern             Follow-ups after your visit        Additional Services     [facility] PT, HAND, AND CHIROPRACTIC REFERRAL       **This order will print in the [facility] Scheduling Office**    Physical Therapy, Hand Therapy and Chiropractic Care are available through:    *[facility]  *[facility]  *[facility]    Call one number to schedule at any of the above locations: [phone].    Your provider has referred you to: Physical Therapy at [facility] or [facility]    Indication/Reason for Referral: Low Back Pain  Onset of Illness: ongoing recurrent pain since last year   Therapy Orders: Evaluate and Treat  Special Programs:   Special Request:     Matthias Rizzo      Additional Comments for the Therapist or Chiropractor:     Please be aware that coverage of these services is subject to the terms and limitations of your health insurance plan.  Call member services at your health plan with any benefit or coverage questions.      Please bring the following to your appointment:    *Your personal calendar for scheduling future appointments  *Comfortable clothing                  Who to contact     If you have questions or need follow up information about today's clinic visit or your schedule please contact [facility] [place]  "directly at [phone].  Normal or non-critical lab and imaging results will be communicated to you by Entradahart, letter or phone within 4 business days after the clinic has received the results. If you do not hear from us within 7 days, please contact the clinic through [email or web address] or phone. If you have a critical or abnormal lab result, we will notify you by phone as soon as possible.  Submit refill requests through [employer] or call your pharmacy and they will forward the refill request to us. Please allow 3 business days for your refill to be completed.          Additional Information About Your Visit        [employer] Information     [employer] gives you secure access to your electronic health record. If you see a primary care provider, you can also send messages to your care team and make appointments. If you have questions, please call your primary care clinic.  If you do not have a primary care provider, please call [phone] and they will assist you.        Care EveryWhere ID     This is your Care EveryWhere ID. This could be used by other organizations to access your [facility] medical records  [id number]        Your Vitals Were     Pulse Temperature Height Pulse Oximetry BMI (Body Mass Index)       67 97.5  F (36.4  C) (Tympanic) 5' 0.75\" (1.543 m) 9% 26.1 kg/m2        Blood Pressure from Last 3 Encounters:   10/10/17 118/77   08/28/17 136/64   06/01/17 118/68    Weight from Last 3 Encounters:   10/10/17 137 lb (62.1 kg)   08/28/17 137 lb (62.1 kg)   06/01/17 136 lb (61.7 kg)              We Performed the Following     Comprehensive metabolic panel     [facility] PT, HAND, AND CHIROPRACTIC REFERRAL     Lipid panel reflex to direct LDL          Today's Medication Changes          These changes are accurate as of: 10/10/17 11:22 AM.  If you have any questions, ask your nurse or doctor.               Start taking these medicines.        Dose/Directions    nabumetone 500 MG tablet   Commonly known as:  RELAFEN   Used for:  " Midline low back pain without sciatica, unspecified chronicity, Left foot pain   Started by:  Fabiola Mahmood MD        Dose:  500-1000 mg   Take 1-2 tablets (500-1,000 mg) by mouth 2 times daily as needed for moderate pain   Quantity:  30 tablet   Refills:  0         These medicines have changed or have updated prescriptions.        Dose/Directions    sertraline 25 MG tablet   Commonly known as:  ZOLOFT   This may have changed:  See the new instructions.   Used for:  Anxiety, Adjustment disorder with mixed anxiety and depressed mood   Changed by:  Fabiola Mahmood MD        Dose:  25 mg   Take 1 tablet (25 mg) by mouth daily   Quantity:  90 tablet   Refills:  0            Where to get your medicines      These medications were sent to [facility] - [place] - [address] AT [facility] [address]  [address] [facility] [place], [place] [address]     Phone:  [phone]     sertraline 25 MG tablet         Some of these will need a paper prescription and others can be bought over the counter.  Ask your nurse if you have questions.     Bring a paper prescription for each of these medications     nabumetone 500 MG tablet                Primary Care Provider Office Phone # Fax #    Fabiola Mahmood [phone]       [address]  [place] [address]        Equal Access to Services     ROMI GRIJALVA AH: Hadii steve ku hadasho Soomaali, waaxda luqadaha, qaybta kaalmada adeegyada, shawna horton haychad clarke . So [facility] [phone].    ATENCIÓN: Si habla español, tiene a hylton disposición servicios gratuitos de asistencia lingüística. Destinee al [phone].    We comply with applicable federal civil rights laws and [place] laws. We do not discriminate on the basis of race, color, national origin, age, disability, sex, sexual orientation, or gender identity.            Thank you!     Thank you for choosing [facility] [place]  for your care. Our goal is always to provide you with excellent care. Hearing back from our patients is one way we can continue to improve our services. Please take a few minutes to complete the written survey that you may receive in the mail after your visit with us. Thank you!             Your Updated Medication List - Protect others around you: Learn how to safely use, store and throw away your medicines at www.disposemymeds.org.          This list is accurate as of: 10/10/17 11:22 AM.  Always use your most recent med list.                   Brand Name Dispense Instructions for use Diagnosis    aspirin 81 MG tablet      Take by mouth daily        atorvastatin 40 MG tablet    LIPITOR    90 tablet    TAKE 1 TABLET BY MOUTH DAILY    Hyperlipidemia LDL goal <70       clobetasol 0.05 % cream    TEMOVATE          MELATONIN PO      Take 1 mg by mouth At Bedtime        metoprolol 25 MG 24 hr tablet    TOPROL-XL    90 tablet    Take 1 tablet (25 mg) by mouth daily    Cardiomyopathy, unspecified       MULTI-VITAMINS Tabs      Take 1 tablet by mouth        nabumetone 500 MG tablet    RELAFEN    30 tablet    Take 1-2 tablets (500-1,000 mg) by mouth 2 times daily as needed for moderate pain    Midline low back pain without sciatica, unspecified chronicity, Left foot pain       nitroGLYcerin 0.4 MG sublingual tablet    NITROSTAT    25 tablet    Place 1 tablet (0.4 mg) under the tongue every 5 minutes as needed for chest pain If you are still having symptoms after 3 doses (15 minutes) call 911.    CAD (coronary artery disease), S/P angioplasty with stent       OMEGA-3 FISH OIL PO      Take 2 g by mouth daily        sertraline 25 MG tablet    ZOLOFT    90 tablet    Take 1 tablet (25 mg) by mouth daily    Anxiety, Adjustment disorder with mixed anxiety and depressed mood

## 2017-10-10 NOTE — NURSING NOTE
"Chief Complaint   Patient presents with     Back Pain     f/u        Initial /77  Pulse 67  Temp 97.5  F (36.4  C) (Tympanic)  Ht 5' 0.75\" (1.543 m)  Wt 137 lb (62.1 kg)  SpO2 (!) 9%  BMI 26.1 kg/m2 Estimated body mass index is 26.1 kg/(m^2) as calculated from the following:    Height as of this encounter: 5' 0.75\" (1.543 m).    Weight as of this encounter: 137 lb (62.1 kg).  Medication Reconciliation: complete  "

## 2017-10-11 LAB
ALBUMIN SERPL-MCNC: 3.7 G/DL (ref 3.4–5)
ALP SERPL-CCNC: 70 U/L (ref 40–150)
ALT SERPL W P-5'-P-CCNC: 32 U/L (ref 0–50)
ANION GAP SERPL CALCULATED.3IONS-SCNC: 8 MMOL/L (ref 3–14)
AST SERPL W P-5'-P-CCNC: 28 U/L (ref 0–45)
BILIRUB SERPL-MCNC: 0.5 MG/DL (ref 0.2–1.3)
BUN SERPL-MCNC: 17 MG/DL (ref 7–30)
CALCIUM SERPL-MCNC: 9 MG/DL (ref 8.5–10.1)
CHLORIDE SERPL-SCNC: 105 MMOL/L (ref 94–109)
CHOLEST SERPL-MCNC: 126 MG/DL
CO2 SERPL-SCNC: 27 MMOL/L (ref 20–32)
CREAT SERPL-MCNC: 0.83 MG/DL (ref 0.52–1.04)
GFR SERPL CREATININE-BSD FRML MDRD: 68 ML/MIN/1.7M2
GLUCOSE SERPL-MCNC: 84 MG/DL (ref 70–99)
HDLC SERPL-MCNC: 61 MG/DL
LDLC SERPL CALC-MCNC: 56 MG/DL
NONHDLC SERPL-MCNC: 65 MG/DL
POTASSIUM SERPL-SCNC: 4.2 MMOL/L (ref 3.4–5.3)
PROT SERPL-MCNC: 7 G/DL (ref 6.8–8.8)
SODIUM SERPL-SCNC: 140 MMOL/L (ref 133–144)
TRIGL SERPL-MCNC: 45 MG/DL

## 2017-10-16 ENCOUNTER — TELEPHONE (OUTPATIENT)
Dept: FAMILY MEDICINE | Facility: CLINIC | Age: 67
End: 2017-10-16

## 2017-10-16 ENCOUNTER — THERAPY VISIT (OUTPATIENT)
Dept: PHYSICAL THERAPY | Facility: CLINIC | Age: 67
End: 2017-10-16
Payer: MEDICARE

## 2017-10-16 DIAGNOSIS — M54.50 MIDLINE LOW BACK PAIN WITHOUT SCIATICA: Primary | ICD-10-CM

## 2017-10-16 PROCEDURE — 97110 THERAPEUTIC EXERCISES: CPT | Mod: GP | Performed by: PHYSICAL THERAPIST

## 2017-10-16 PROCEDURE — 97161 PT EVAL LOW COMPLEX 20 MIN: CPT | Mod: GP | Performed by: PHYSICAL THERAPIST

## 2017-10-16 PROCEDURE — 97112 NEUROMUSCULAR REEDUCATION: CPT | Mod: GP | Performed by: PHYSICAL THERAPIST

## 2017-10-16 PROCEDURE — G8981 BODY POS CURRENT STATUS: HCPCS | Mod: GP | Performed by: PHYSICAL THERAPIST

## 2017-10-16 PROCEDURE — G8982 BODY POS GOAL STATUS: HCPCS | Mod: GP | Performed by: PHYSICAL THERAPIST

## 2017-10-16 NOTE — PROGRESS NOTES
Subjective:    Patient is a 67 year old female presenting with rehab back hpi.   Shelley Mccrary is a 67 year old female with a lumbar condition.  Condition occurred with:  Insidious onset.  Condition occurred: for unknown reasons.  This is a chronic and recurrent condition  MD estrella 10/10/17. Shelley has been having LBP since Dec 2016. She felt intense pain while transferring into the car on a hill. The pain lasted for evening, next day she drove to [place]. She has massaged it and iced it, which helped. She returned form [place] on May 2017 as the pain never went away. She was given pain medication, which she did not use. She feels her back gave out in summer 4 times while taking shower at the cabin. She is a very active person- she swims, walking 2-5 miles/ day, raking, cleaning, lifting weights, elliptical. She has the painful episodes while walking, driving more than 15 min, rolling over to either sides, her legs get noodly some times. She met with her PCP requesting MRI, but she was sent to PT for further management. .    Patient reports pain:  Lower lumbar spine, SI joint right and SI joint left.    Pain is described as aching and stabbing and is intermittent and reported as 2/10.  Associated symptoms:  Tingling and numbness. Pain is worse during the day.  Symptoms are exacerbated by lying down, sitting and walking   Since onset symptoms are gradually improving.    Previous treatment includes chiropractic.  There was mild improvement following previous treatment.  General health as reported by patient is excellent.  Pertinent medical history includes:  Heart problems (heart attack 2015- stents. ).  Medical allergies: no.  Other surgeries include:  Heart surgery (stents).  Current medications:  High blood pressure medication and anti-depressants (cholestrol).  Current occupation is Retired.                                    Objective:    Standing Alignment:        Lumbar:  Anterior pelvic tilt and lordosis  decr                Flexibility/Screens:       Lower Extremity:  Decreased left lower extremity flexibility:Hip ER's; Piriformis; Hamstrings; Gastroc and Soleus    Decreased right lower extremity flexibility:  Hip ER's; Piriformis; Hamstrings; Gastroc and Soleus               Lumbar/SI Evaluation  ROM:    AROM Lumbar:   Flexion:            Till toes with no pain- repeated motion no pain stated other than some discomfort in her posteror thigh- right side  Ext:                    WFL- no pain   Side Bend:        Left:  WFL    Right:  WFL  Rotation:           Left:  WFL    Right:  WFL  Side Glide:        Left:     Right:       AROM Thoracic: normal  Strength: weak core muscle activation while walking. MMT 2/5  Lumbar Myotomes:  normal            Lumbar DTR's:  normal      Cord Signs:  normal    Lumbar Dermtomes:  normal                Neural Tension/Mobility:  Lumbar:  Normal        Lumbar Palpation:      Tenderness present at Right: Erector Spinae; Piriformis and PSIS    Lumbar Provocation:  normal        SI joint/Sacrum:            Right negative at:  Forward bend standing; Forward bend seated; Squish; Thigh thrust and Sacral thrust                                                   Tessy Lumbar Evaluation    Posture:  Sitting: fair  Standing: fair  Lordosis: Reduced  Lateral Shift: no  Correction of Posture: better    Movement Loss:  Flexion (Flex): nil  Extension (EXT): nil  Side Glide R (SG R): nil  Side Glide L (SG L): nil  Test Movements:  FIS: During: no effect  After: no effect    Repeat FIS: During: increases  After: peripheralizing    EIS: During: no effect  After: no effect    Repeat EIS: During: no effect  After: better  Mechanical Response: IncROM          Static Tests:  Sitting Slouched: increases pain  Sitting Erect: better  Standing Slouched: increases pain  Standing Erect: better      Conclusion: posture and dysfunction  Principle of Treatment:  Posture Correction: seated with back support- ear  shoulder hip alignment discussed  Flexion: avoided  Extension: encouraged                                           ROS    Assessment/Plan:      Patient is a 67 year old female with lumbar complaints.    Patient has the following significant findings with corresponding treatment plan.                Diagnosis 1:  Midline LBP without sciatica  Pain -  hot/cold therapy, US, electric stimulation, manual therapy, STS, splint/taping/bracing/orthotics, self management, education, directional preference exercise and home program  Decreased ROM/flexibility - manual therapy, therapeutic exercise, therapeutic activity and home program  Decreased strength - therapeutic exercise, therapeutic activities and home program  Decreased function - therapeutic activities and home program    Therapy Evaluation Codes:   1) History comprised of:   Personal factors that impact the plan of care:      Past/current experiences.    Comorbidity factors that impact the plan of care are:      Heart problems and Numbness/tingling.     Medications impacting care: Anti-depressant and High blood pressure.  2) Examination of Body Systems comprised of:   Body structures and functions that impact the plan of care:      Lumbar spine.   Activity limitations that impact the plan of care are:      Driving, Standing, Walking and Sleeping.  3) Clinical presentation characteristics are:   Stable/Uncomplicated.  4) Decision-Making    Low complexity using standardized patient assessment instrument and/or measureable assessment of functional outcome.  Cumulative Therapy Evaluation is: Low complexity.    Previous and current functional limitations:  (See Goal Flow Sheet for this information)    Short term and Long term goals: (See Goal Flow Sheet for this information)     Communication ability:  Patient appears to be able to clearly communicate and understand verbal and written communication and follow directions correctly.  Treatment Explanation - The following  has been discussed with the patient:   RX ordered/plan of care  Anticipated outcomes  Possible risks and side effects  This patient would benefit from PT intervention to resume normal activities.   Rehab potential is good.    Frequency:  1 X week, once daily  Duration:  for 6 weeks  Discharge Plan:  Achieve all LTG.  Independent in home treatment program.  Reach maximal therapeutic benefit.    Please refer to the daily flowsheet for treatment today, total treatment time and time spent performing 1:1 timed codes.

## 2017-10-16 NOTE — TELEPHONE ENCOUNTER
Patient referred to [facility] by Fabiola Sanchez MD. Called patient to schedule appointment, appointment made for 11/7/2017 at 3:40pm with Dr. Modesta Thomas.   Lorena Lancaster MA

## 2017-10-16 NOTE — LETTER
DEPARTMENT OF HEALTH AND HUMAN SERVICES  [facility] FOR MEDICARE & MEDICAID SERVICES    PLAN/UPDATED PLAN OF PROGRESS FOR OUTPATIENT REHABILITATION    PATIENTS NAME:  Shelley Mccrary     : 1950    PROVIDER NUMBER:    [id number]    [facility]  PR[facility] NAME: [facility] [place] [facility]    MEDICAL RECORD NUMBER: [id number]     START OF CARE DATE:  SOC Date: 10/16/17   TYPE:  PT    PRIMARY/TREATMENT DIAGNOSIS: (Pertinent Medical Diagnosis)  Midline low back pain without sciatica    VISITS FROM START OF CARE:  Rxs Used: 1     Subjective:    Patient is a 67 year old female presenting with rehab back hpi.   Shelley Mccrary is a 67 year old female with a lumbar condition.  Condition occurred with:  Insidious onset.  Condition occurred: for unknown reasons.  This is a chronic and recurrent condition  MD order 10/10/17. Shelley has been having LBP since Dec 2016. She felt intense pain while transferring into the car on a hill. The pain lasted for evening, next day she drove to [place]. She has massaged it and iced it, which helped. She returned form [place] on May 2017 as the pain never went away. She was given pain medication, which she did not use. She feels her back gave out in summer 4 times while taking shower at the cabin. She is a very active person- she swims, walking 2-5 miles/ day, raking, cleaning, lifting weights, elliptical. She has the painful episodes while walking, driving more than 15 min, rolling over to either sides, her legs get noodly some times. She met with her PCP requesting MRI, but she was sent to PT for further management. .    Patient reports pain:  Lower lumbar spine, SI joint right and SI joint left.    Pain is described as aching and stabbing and is intermittent and reported as 2/10.  Associated symptoms:  Tingling and numbness. Pain is worse during the day.  Symptoms are exacerbated by lying down, sitting and walking   Since onset symptoms are  gradually improving.    Previous treatment includes chiropractic.  There was mild improvement following previous treatment.  General health as reported by patient is excellent.  Pertinent medical history includes:  Heart problems (heart attack 2015- stents. ).  Medical allergies: no.  Other surgeries include:  Heart surgery (stents).  Current medications:  High blood pressure medication and anti-depressants (cholestrol).  Current occupation is Retired.                  PATIENTS NAME:  Shelley Mccrary     : 1950                   Objective:    Standing Alignment:    Lumbar:  Anterior pelvic tilt and lordosis decr  Flexibility/Screens:   Lower Extremity:  Decreased left lower extremity flexibility:Hip ER's; Piriformis; Hamstrings; Gastroc and Soleus  Decreased right lower extremity flexibility:  Hip ER's; Piriformis; Hamstrings; Gastroc and Soleus  Lumbar/SI Evaluation  ROM:    AROM Lumbar:   Flexion:            Till toes with no pain- repeated motion no pain stated other than some discomfort in her posteror thigh- right side  Ext:                    WFL- no pain   Side Bend:        Left:  WFL    Right:  WFL  Rotation:           Left:  WFL    Right:  WFL  Side Glide:        Left:     Right:   AROM Thoracic: normal  Strength: weak core muscle activation while walking. MMT 2/5  Lumbar Myotomes:  normal  Lumbar DTR's:  normal  Cord Signs:  normal  Lumbar Dermtomes:  normal  Neural Tension/Mobility:  Lumbar:  Normal    Lumbar Palpation:    Tenderness present at Right: Erector Spinae; Piriformis and PSIS  Lumbar Provocation:  normal  SI joint/Sacrum:      Right negative at:  Forward bend standing; Forward bend seated; Squish; Thigh thrust and Sacral thrust  Tessy Lumbar Evaluation  Posture:  Sitting: fair  Standing: fair  Lordosis: Reduced  Lateral Shift: no  Correction of Posture: better  Movement Loss:  Flexion (Flex): nil  Extension (EXT): nil  Side Glide R (SG R): nil  Side Glide L (SG L):  nil              PATIENTS NAME:  Shelley Mccrary     : 1950    Test Movements:  FIS: During: no effect  After: no effect    Repeat FIS: During: increases  After: peripheralizing    EIS: During: no effect  After: no effect    Repeat EIS: During: no effect  After: better  Mechanical Response: IncROM  Static Tests:  Sitting Slouched: increases pain  Sitting Erect: better  Standing Slouched: increases pain  Standing Erect: better  Conclusion: posture and dysfunction  Principle of Treatment:  Posture Correction: seated with back support- ear shoulder hip alignment discussed  Flexion: avoided  Extension: encouraged    Assessment/Plan:      Patient is a 67 year old female with lumbar complaints.    Patient has the following significant findings with corresponding treatment plan.                Diagnosis 1:  Midline LBP without sciatica  Pain -  hot/cold therapy, US, electric stimulation, manual therapy, STS, splint/taping/bracing/orthotics, self management, education, directional preference exercise and home program  Decreased ROM/flexibility - manual therapy, therapeutic exercise, therapeutic activity and home program  Decreased strength - therapeutic exercise, therapeutic activities and home program  Decreased function - therapeutic activities and home program    Therapy Evaluation Codes:   1) History comprised of:   Personal factors that impact the plan of care:      Past/current experiences.    Comorbidity factors that impact the plan of care are:      Heart problems and Numbness/tingling.     Medications impacting care: Anti-depressant and High blood pressure.  2) Examination of Body Systems comprised of:   Body structures and functions that impact the plan of care:      Lumbar spine.   Activity limitations that impact the plan of care are:      Driving, Standing, Walking and Sleeping.  3) Clinical presentation characteristics are:   Stable/Uncomplicated.  4) Decision-Making    Low complexity using  "standardized patient assessment instrument and/or measureable assessment of functional outcome.  Cumulative Therapy Evaluation is: Low complexity.      PATIENTS NAME:  Shelley Mccrary     : 1950        Previous and current functional limitations:  (See Goal Flow Sheet for this information)    Short term and Long term goals: (See Goal Flow Sheet for this information)   Communication ability:  Patient appears to be able to clearly communicate and understand verbal and written communication and follow directions correctly.  Treatment Explanation - The following has been discussed with the patient:   RX ordered/plan of care  Anticipated outcomes  Possible risks and side effects  This patient would benefit from PT intervention to resume normal activities.   Rehab potential is good.  Frequency:  1 X week, once daily  Duration:  for 6 weeks  Discharge Plan:  Achieve all LTG.  Independent in home treatment program.  Reach maximal therapeutic benefit.    Caregiver Signature/Credentials _____________________________ Date ________       Treating Provider: Hanane Lilly, PT     I have reviewed and certified the need for these services and plan of treatment while under my care.        PHYSICIAN'S SIGNATURE:   _________________________________________  Date___________   Fabiola Mahmood MD    Certification period:  Beginning of Cert date period: 10/16/17 to  End of Cert period date: 18     Functional Level Progress Report: Please see attached \"Goal Flow sheet for Functional level.\"    ____X____ Continue Services or       ________ DC Services                Service dates: From  SOC Date: 10/16/17 date to present                         "

## 2017-10-16 NOTE — MR AVS SNAPSHOT
After Visit Summary   10/16/2017    Shelley Mccrary    MRN: [id number]           Patient Information     Date Of Birth          1950        Visit Information        Provider Department      10/16/2017 12:50 PM Hanane Lilly, [facility] [place] [facility]        Today's Diagnoses     Midline low back pain without sciatica    -  1       Follow-ups after your visit        Your next 10 appointments already scheduled     Oct 26, 2017  2:10 PM CDT   [facility] Spine with Hanane Lilly PT   [facility] [place] [facility] ([facility] [place])    [address] [place] [address] [id number]  [place] [id number] [phone]            Nov 07, 2017  3:40 PM CST   Office Visit with Modesta Thomas MD   [facility] ([facility] )    [address] [place] [address] Drive  [address]  [facility] [id number] [phone]           Bring a current list of meds and any records pertaining to this visit. For Physicals, please bring immunization records and any forms needing to be filled out. Please arrive 10 minutes early to complete paperwork.              Who to contact     If you have questions or need follow up information about today's clinic visit or your schedule please contact [facility] directly at [phone].  Normal or non-critical lab and imaging results will be communicated to you by MyChart, letter or phone within 4 business days after the clinic has received the results. If you do not hear from us within 7 days, please contact the clinic through MyChart or phone. If you have a critical or abnormal lab result, we will notify you by phone as soon as possible.  Submit refill requests through [facility] or call your pharmacy and they will forward the refill request to us. Please allow 3 business days for your refill to be completed.           Additional Information About Your Visit        [employer] Information     [employer] gives you secure access to your electronic health record. If you see a primary care provider, you can also send messages to your care team and make appointments. If you have questions, please call your primary care clinic.  If you do not have a primary care provider, please call [phone] and they will assist you.        Care EveryWhere ID     This is your Care EveryWhere ID. This could be used by other organizations to access your [facility] medical records  [id number]         Blood Pressure from Last 3 Encounters:   10/10/17 118/77   08/28/17 136/64   06/01/17 118/68    Weight from Last 3 Encounters:   10/10/17 62.1 kg (137 lb)   08/28/17 62.1 kg (137 lb)   06/01/17 61.7 kg (136 lb)              We Performed the Following     HC PT EVAL, LOW COMPLEXITY     [facility] CERT REPORT     [facility] INITIAL EVAL REPORT     NEUROMUSCULAR RE-EDUCATION     THERAPEUTIC EXERCISES        Primary Care Provider Office Phone # Fax #    Fabiola Paresh Mahmood [phone]        [facility] [facility] [address]  [place] [address]        Equal Access to Services     [facility] AH: Hadii aad ku hadasho Soomaali, waaxda luqadaha, qaybta kaalmada adeegyada, waxay romuloin hayjanetn abrahan clarke ah. So [facility] [phone].    ATENCIÓN: Si habla español, tiene a hylton disposición servicios gratuitos de asistencia lingüística. Llame al [phone].    We comply with applicable federal civil rights laws and [place] laws. We do not discriminate on the basis of race, color, national origin, age, disability, sex, sexual orientation, or gender identity.            Thank you!     Thank you for choosing [facility]  [place] [facility]  for your care. Our goal is always to provide you with excellent care. Hearing back from our patients is one way we can continue to improve our services. Please take a few minutes to complete the written survey  that you may receive in the mail after your visit with us. Thank you!             Your Updated Medication List - Protect others around you: Learn how to safely use, store and throw away your medicines at www.disposemymeds.org.          This list is accurate as of: 10/16/17  2:38 PM.  Always use your most recent med list.                   Brand Name Dispense Instructions for use Diagnosis    aspirin 81 MG tablet      Take by mouth daily        atorvastatin 40 MG tablet    LIPITOR    90 tablet    TAKE 1 TABLET BY MOUTH DAILY    Hyperlipidemia LDL goal <70       clobetasol 0.05 % cream    TEMOVATE          MELATONIN PO      Take 1 mg by mouth At Bedtime        metoprolol 25 MG 24 hr tablet    TOPROL-XL    90 tablet    Take 1 tablet (25 mg) by mouth daily    Cardiomyopathy, unspecified       MULTI-VITAMINS Tabs      Take 1 tablet by mouth        nabumetone 500 MG tablet    RELAFEN    30 tablet    Take 1-2 tablets (500-1,000 mg) by mouth 2 times daily as needed for moderate pain    Midline low back pain without sciatica, unspecified chronicity, Left foot pain       nitroGLYcerin 0.4 MG sublingual tablet    NITROSTAT    25 tablet    Place 1 tablet (0.4 mg) under the tongue every 5 minutes as needed for chest pain If you are still having symptoms after 3 doses (15 minutes) call 911.    CAD (coronary artery disease), S/P angioplasty with stent       OMEGA-3 FISH OIL PO      Take 2 g by mouth daily        sertraline 25 MG tablet    ZOLOFT    90 tablet    Take 1 tablet (25 mg) by mouth daily    Adjustment disorder with mixed anxiety and depressed mood

## 2017-10-26 ENCOUNTER — THERAPY VISIT (OUTPATIENT)
Dept: PHYSICAL THERAPY | Facility: CLINIC | Age: 67
End: 2017-10-26
Payer: MEDICARE

## 2017-10-26 DIAGNOSIS — M54.50 MIDLINE LOW BACK PAIN WITHOUT SCIATICA: ICD-10-CM

## 2017-10-26 PROCEDURE — 97110 THERAPEUTIC EXERCISES: CPT | Mod: GP | Performed by: PHYSICAL THERAPIST

## 2017-10-26 PROCEDURE — 97112 NEUROMUSCULAR REEDUCATION: CPT | Mod: GP | Performed by: PHYSICAL THERAPIST

## 2017-11-02 ENCOUNTER — THERAPY VISIT (OUTPATIENT)
Dept: PHYSICAL THERAPY | Facility: CLINIC | Age: 67
End: 2017-11-02
Payer: MEDICARE

## 2017-11-02 DIAGNOSIS — M54.50 MIDLINE LOW BACK PAIN WITHOUT SCIATICA: ICD-10-CM

## 2017-11-02 PROCEDURE — 97140 MANUAL THERAPY 1/> REGIONS: CPT | Mod: GP | Performed by: PHYSICAL THERAPIST

## 2017-11-02 PROCEDURE — 97110 THERAPEUTIC EXERCISES: CPT | Mod: GP | Performed by: PHYSICAL THERAPIST

## 2017-11-07 ENCOUNTER — OFFICE VISIT (OUTPATIENT)
Dept: FAMILY MEDICINE | Facility: CLINIC | Age: 67
End: 2017-11-07
Payer: MEDICARE

## 2017-11-07 DIAGNOSIS — L82.1 SEBORRHEIC KERATOSES: ICD-10-CM

## 2017-11-07 DIAGNOSIS — L56.8 SKIN TANNING DUE TO EXPOSURE TO ULTRAVIOLET LIGHT: ICD-10-CM

## 2017-11-07 DIAGNOSIS — L81.4 SOLAR LENTIGO: ICD-10-CM

## 2017-11-07 DIAGNOSIS — D48.5 NEOPLASM OF UNCERTAIN BEHAVIOR OF SKIN: Primary | ICD-10-CM

## 2017-11-07 DIAGNOSIS — L57.0 AK (ACTINIC KERATOSIS): ICD-10-CM

## 2017-11-07 DIAGNOSIS — E78.5 HYPERLIPIDEMIA LDL GOAL <70: ICD-10-CM

## 2017-11-07 DIAGNOSIS — Z87.2 HISTORY OF ACTINIC KERATOSES: ICD-10-CM

## 2017-11-07 PROCEDURE — 88305 TISSUE EXAM BY PATHOLOGIST: CPT | Mod: 90 | Performed by: FAMILY MEDICINE

## 2017-11-07 PROCEDURE — 99213 OFFICE O/P EST LOW 20 MIN: CPT | Mod: 25 | Performed by: FAMILY MEDICINE

## 2017-11-07 PROCEDURE — 17000 DESTRUCT PREMALG LESION: CPT | Mod: 51 | Performed by: FAMILY MEDICINE

## 2017-11-07 PROCEDURE — 99000 SPECIMEN HANDLING OFFICE-LAB: CPT | Performed by: FAMILY MEDICINE

## 2017-11-07 PROCEDURE — 17003 DESTRUCT PREMALG LES 2-14: CPT | Performed by: FAMILY MEDICINE

## 2017-11-07 PROCEDURE — 11300 SHAVE SKIN LESION 0.5 CM/<: CPT | Mod: 59 | Performed by: FAMILY MEDICINE

## 2017-11-07 RX ORDER — ATORVASTATIN CALCIUM 40 MG/1
TABLET, FILM COATED ORAL
Qty: 90 TABLET | Refills: 3 | Status: SHIPPED | OUTPATIENT
Start: 2017-11-07 | End: 2018-10-23

## 2017-11-07 NOTE — PATIENT INSTRUCTIONS
"FUTURE APPOINTMENTS  Follow up in 1 year(s) for a full-body skin cancer screening.  Follow up per pathology report.    WOUND CARE INSTRUCTIONS  1. After 24 hours, change dressing daily.  2. Wash hands before every dressing change.  3. Wash the wound area with a mild soap, then rinse  4. Gently pat dry with a sterile gauze or Q-tip.  5. Apply Vaseline or Aquaphor only over entire wound. Do NOT use Neosporin - as many people react to neomycin.  6. Finally, cover with a bandage or sterile non-stick gauze with micropore paper tape.  7. Repeat once daily until wound has healed.    Do not let the wound dry out.  The wound will heal faster and with better cosmetic results if routinely kept moist with step #5. It is a false belief that a wound heals better when it is exposed to air and allowed to dry out.      Soap, water and shampoo will not hurt this area.    Do not go swimming or take baths, but showering is encouraged.    Limit use of the area where the procedure was done for a few days to allow for optimal healing.    If you experience bleeding:  Repeat steps #2-5 and hold firm pressure on the area for 10 minutes without checking to see if the bleeding has stopped. \"Checking\" pulls off the protective wound clot and restarts the bleeding all over again. Re-apply pressure for 10 minutes if necessary to stop bleeding.  Use additional sterile gauze and tape to maintain pressure once bleeding has stopped.    Signs of Infection:  Infection can occur in any area where skin has been disrupted.  If you notice persistent redness, swelling, colored drainage, increasing pain, fever or other signs of infection, please call us at: [phone] and ask to have me or my colleague paged. We will call you back to discuss.    Pathology Results:  You will be notified, generally via letter or MyChart, in approximately 10 days. If there is anything we need to discuss or further treatment needed, I will call you to discuss it.    Skin " tissue can be some of the most challenging tissue for pathologists to examine, therefore we may use a specialist outside the [employer] system to read certain submitted tissue samples. When submitted to these outside specialists, [employer] will notify you that your tissue sample result has returned. However, this only means that the tissue sample has been sent to the specialist. These results are not available in [employer] [facility] [employer], so I will contact you when I receive the final report.    PATIENT INFORMATION : WOUNDS  During the healing process you will notice a number of changes. All wounds develop a small halo of redness surrounding the wound.  This means healing is occurring. Severe itching with extensive redness usually indicates sensitivity to the ointment or bandage tape used to dress the wound.  You should call our office if this develops.      Swelling  and/or discoloration around your surgical site is common, particularly when performed around the eye.    All wounds normally drain.  The larger the wound the more drainage there will be.  After 7-10 days, you will notice the wound beginning to shrink and new skin will begin to grow.  The wound is healed when you can see skin has formed over the entire area.  A healed wound has a healthy, shiny look to the surface and is red to dark pink in color to normalize.  Wounds may take approximately 4-6 weeks to heal.  Larger wounds may take 6-8 weeks. After the wound is healed you may discontinue dressing changes.    You may experience a sensation of tightness as your wound heals. This is normal and will gradually subside.    Your healed wound may be sensitive to temperature changes. This sensitivity improves with time, but if you re having a lot of discomfort, try to avoid temperature extremes.    Patients frequently experience itching after their wound appears to have healed because of the continue healing under the skin.  Plain Vaseline will help relieve the itching.    SUN  "PROTECTION INSTRUCTIONS  Sun damage can lead to skin cancer and premature aging of the skin.      The best way to protect from sun damage to your skin is to avoid the sun during peak hours (10 am - 2 pm) even on overcast days.      Use UPF sun-protective clothing, which while more expensive initially provides longer lasting coverage without having to worry about remembering to re-apply.  1. Wear a wide-brimmed hat and sunglasses.   2. Wear sun-protective clothing.  [employer] make sun protective clothing that are stylish, comfortable and cool. [employer] other [employer] make UV arm sleeves suitable for golfing, gardening and other activities.      Sunscreen instructions:  1. Use sunscreens with Zinc Oxide, Titanium Dioxide or Avobenzone to protect from UVA rays.  2. Use SPF 30-50+ to protect from UVB rays.  3. Re-apply every 2 hours even if water resistant.  4. Apply on your face every day even when cloudy and even in the winter. UVA \"aging rays\" penetrate window glass and are just as strong in the winter as in the summer.    Product Recommendations:    Good examples include: Blue Lizard, EltaMD, Solbar    Good daily moisturizers with SPF: Vanicream, CeraVe.    For sensitive skin, consider : [employer]ica Essential Defense Mineral Shield Broad Spectrum SPF 35      Never use tanning beds. Tanning beds are associated with much higher risks of skin cancer.    All tanning damages the skin. Aim for ivory skin year round and you will have less trouble with your skin in years to come. There is no merit in getting \"a base tan\" before a warm weather vacation, as any tanning indicates your body's response to sun damage.    Stop smoking. Smokers have higher rates of skin cancer and also have premature skin wrinkling.    FYI  You should use about 3 tablespoons of sunscreen to protect your whole body. Thus a typical eight ounce bottle of sunscreen should last 4 applications. Remember, that the " SPF rating is compromised if you don t apply enough. Most people only apply 1/2 - 1/3 of the amount they need. Also don t forget areas such as your ears, feet, upper back and harder to reach places. Keep in mind that these amounts should be increased for larger body sizes.    Sunscreens with titanium dioxide and/or zinc oxide in the active ingredients are physical blockers as opposed to chemical blockers. Chemical-free sunscreens should not irritate the skin.    Spray-on sunscreens may be used for touch-up application only, not as a base layer. Also, use with caution around small children due to inhalation risk.    Avoid retinyl palmitate products.    Avoid combination products that include both sunscreen and insect repellant, as sunscreen should be applied every 2 hours, but insect repellant should not be applied as frequently.    SPF means sun protection factor, which is just the degree to which the sunscreen can protect against UVB rays. There is no rating system for UVA rays. SPF is calculated as the time skin will burn when sunscreen is applied vs. skin without sunscreen.    Water resistant sunscreens should be re-applied every 1-2 hours.    For more information:  http://www.skincancer.org/prevention/sun-protection/sunscreen/sunscreens-safe-and-effective    CRYOTHERAPY FOR ACTINIC KERATOSES POST-TREATMENT CARE INSTRUCTIONS  Actinic keratoses are benign, scaly or gritty lesions that appear in sun-exposed areas and may progress to skin cancers. For this reason, it is important to treat them before they become cancerous.  Liquid nitrogen is mildly uncomfortable when applied to the skin, but the discomfort rapidly subsides.    Post-Treatment:  You may experience burning and/or stinging immediately following the procedure. The discomfort from the procedure may persist over the next 12-24 hours. The area treated will look pinker and slightly swollen before the healing process begins. You may also notice redness,  swelling, tenderness, weeping and crusts or scabs. Healing time is approximately 10-14 days.    Blister - You may or may notice blistering from the freezing. If you develop an uncomfortable blister from today's treatment, you may gently puncture this with a needle that has been cleaned with alcohol. However, do not remove the protective skin layer of the blister.    Scab - After a few days, you may notice scaliness or scab formation. Do not pick at the scabs because this may cause slower healing and a permanent scar.    The skin may appear temporarily darker at the treatment site, but this usually fades over a period of months, provided that the area is protected from the sun.    Care of the areas treated:    Wash the area with a mild cleanser.    Gently pat dry.    Do not rub.     Keep protected from the sun during the healing process and for a full year following treatment as the skin continues to remodel during this time.    Apply Vaseline or Aquaphor ointment sparingly to the site for the first 7 days after treatment.    Do not use Neosporin, as many people eventually develop a medication allergy, that can easily be confused with an infection, to Neomycin.    Return if:  There should not be any residual scaling. If there is any concern that the lesion has persisted after 4-6 weeks, make an appointment for a re-check. Healing time does vary depending on your individual healing process and the area of the body treated. Most patients will be healed in one month.    Signs of Infection:  Thankfully this is rare. However if you notice persistent colored drainage, increasing pain, fever or other signs of infection, please call us at: [phone]    TOPICAL MEDICATION  Consider applying a thin film to the affected areas on the face hydroquinone 4% cream nightly at bedtime.  Make sure to apply sunscreen every day on the face with re-application throughout the day as sun exposure will counteract any benefits from this  topical medication.

## 2017-11-07 NOTE — TELEPHONE ENCOUNTER
Prescription approved per [facility], [facility] or [facility] refill protocol.  Christine Serna RN - Triage  [facility]

## 2017-11-07 NOTE — PROGRESS NOTES
"[facility] - PRIMARY CARE SKIN    CC : skin cancer screening (full-body)  SUBJECTIVE:                                                    Shelley Mccrary is a 67 year old female who presents to clinic today for a full-body skin exam because of a spot on the chest.    Bothersome lesions noticed by the patient or other skin concerns :  Issue One : A lesion on chest appears as a non-healing pimple.  Onset : 4 months.  Enlarging : NO.  Bleeding : NO  Itchy or irritating : NO.  Pain or tenderness : YES.  Changing color : NO.  Issue Two : There are itchy, brown, scaly lesions on the back.    Personal history of skin cancer : NO - history of actinic keratoses.  Family history of skin cancer : NO.    Sun Exposure History  Previous history of significant sun exposure: YES    Occupation : retired.    Refer to electronic medical record (EMR) for past medical history and medications.    INTEGUMENTARY/SKIN: POSITIVE for non-healing lesion  ROS : 14 point review of systems was negative except the symptoms listed above in the HPI.    This document serves as a record of the services and decisions personally performed and made by La Thomas MD. It was created on her behalf by Thaddeus Dietrich, a trained medical scribe.  The creation of this document is based on the scribe's personal observations and the provider's statements to the medical scribe.  Thaddeus Dietrich, November 7, 2017 3:29 PM      OBJECTIVE:                                                    GENERAL: healthy, alert and no distress  SKIN: Baird Skin Type - III.  This patient was examined from the top of the head to the bottom of the feet  including scalp, face, neck, back, chest, breasts, buttocks, both arms, both legs, both hands, both feet, all 10 fingers and all 10 toes. The dermatoscope was used to help evaluate pigmented lesions.  Skin Pertinent Findings:  Right postauricular area : \"stuck on\" appearing papules, raised, brown, coarse-textured, round lesion(s) " "most consistent with seborrheic keratoses.    Face : Scattered brown, macule(s) most consistent with benign solar lentigo.    Arms, Back : Scattered \"stuck on\" appearing papules, raised, brown, coarse-textured, round lesion(s) most consistent with seborrheic keratoses. Scattered brown, macule(s) most consistent with benign solar lentigo.    Legs : Multiple, brown, macule(s) most consistent with benign solar lentigo.    Posterior legs : Scattered \"stuck on\" appearing papules, raised, brown, coarse-textured, round lesion(s) most consistent with seborrheic keratoses.    Significant Findings:  Chest, 2 cm left of midline, at T2 : 3 mm in size, erythematous, indurated lesion with small hyperkeratotic center. ? Basal cell carcinoma ? Squamous cell carcinoma ? Hypertrophic actinic keratosis ? Other    Face : 3 mm - 5 mm in size, erythematous, scaly, non-indurated lesion(s) most consistent with actinic keratoses.  Name : Liquid Nitrogen Cry-Ac Cryotherapy.  Indication : Pre-malignant lesion.  Location(s) : right lateral cheek - x1, right upper cheek - x1, mid-forehead - x1, right side of nose - x1, right lateral temple - x1.  Completed by : La Thomas MD  Note : Discussed natural history of lesion and treatment options. Prior to treatment, we discussed inflammation, tenderness post-procedure, the healing process, and the risks of pain, infection, scarring, blistering, and hypo-/hyperpigmentation after healing. Explained that these lesions may grow back and may need additional treatment or re-treatment. The patient expressed a desire to proceed with cryotherapy.    The lesion(s) was treated with liquid nitrogen Cry-Ac, five second freeze repeated twice with a pause to allow for the area to thaw. If this lesion should recur, then it needs to be re-evaluated.    Patient tolerated the procedure well and left in good condition.  Total number of lesions treated : 5.    Diagnostic Test Results:  none           ASSESSMENT:       " "                                               Encounter Diagnoses   Name Primary?     Neoplasm of uncertain behavior of skin Yes     AK (actinic keratosis)      History of actinic keratoses      Skin tanning due to exposure to ultraviolet light      Seborrheic keratoses      Solar lentigo          PLAN:                                                    Patient Instructions   FUTURE APPOINTMENTS  Follow up in 1 year(s) for a full-body skin cancer screening.    SUN PROTECTION INSTRUCTIONS  Sun damage can lead to skin cancer and premature aging of the skin.      The best way to protect from sun damage to your skin is to avoid the sun during peak hours (10 am - 2 pm) even on overcast days.      Use UPF sun-protective clothing, which while more expensive initially provides longer lasting coverage without having to worry about remembering to re-apply.  1. Wear a wide-brimmed hat and sunglasses.   2. Wear sun-protective clothing.  [employer] other [employer] make sun protective clothing that are stylish, comfortable and cool. [employer] and other [employer] make UV arm sleeves suitable for golfing, gardening and other activities.      Sunscreen instructions:  1. Use sunscreens with Zinc Oxide, Titanium Dioxide or Avobenzone to protect from UVA rays.  2. Use SPF 30-50+ to protect from UVB rays.  3. Re-apply every 2 hours even if water resistant.  4. Apply on your face every day even when cloudy and even in the winter. UVA \"aging rays\" penetrate window glass and are just as strong in the winter as in the summer.    Product Recommendations:    Good examples include: Blue Lizard, EltaMD, Solbar    Good daily moisturizers with SPF: Vanicream, CeraVe.    For sensitive skin, consider : Skin[employer]ica Essential Defense Mineral Shield Broad Spectrum SPF 35      Never use tanning beds. Tanning beds are associated with much higher risks of skin cancer.    All tanning damages the skin. Aim for ivory skin year round and you " "will have less trouble with your skin in years to come. There is no merit in getting \"a base tan\" before a warm weather vacation, as any tanning indicates your body's response to sun damage.    Stop smoking. Smokers have higher rates of skin cancer and also have premature skin wrinkling.    FYI  You should use about 3 tablespoons of sunscreen to protect your whole body. Thus a typical eight ounce bottle of sunscreen should last 4 applications. Remember, that the SPF rating is compromised if you don t apply enough. Most people only apply 1/2 - 1/3 of the amount they need. Also don t forget areas such as your ears, feet, upper back and harder to reach places. Keep in mind that these amounts should be increased for larger body sizes.    Sunscreens with titanium dioxide and/or zinc oxide in the active ingredients are physical blockers as opposed to chemical blockers. Chemical-free sunscreens should not irritate the skin.    Spray-on sunscreens may be used for touch-up application only, not as a base layer. Also, use with caution around small children due to inhalation risk.    Avoid retinyl palmitate products.    Avoid combination products that include both sunscreen and insect repellant, as sunscreen should be applied every 2 hours, but insect repellant should not be applied as frequently.    SPF means sun protection factor, which is just the degree to which the sunscreen can protect against UVB rays. There is no rating system for UVA rays. SPF is calculated as the time skin will burn when sunscreen is applied vs. skin without sunscreen.    Water resistant sunscreens should be re-applied every 1-2 hours.    For more information:  http://www.skincancer.org/prevention/sun-protection/sunscreen/sunscreens-safe-and-effective    CRYOTHERAPY FOR ACTINIC KERATOSES POST-TREATMENT CARE INSTRUCTIONS  Actinic keratoses are benign, scaly or gritty lesions that appear in sun-exposed areas and may progress to skin cancers. For this " reason, it is important to treat them before they become cancerous.  Liquid nitrogen is mildly uncomfortable when applied to the skin, but the discomfort rapidly subsides.    Post-Treatment:  You may experience burning and/or stinging immediately following the procedure. The discomfort from the procedure may persist over the next 12-24 hours. The area treated will look pinker and slightly swollen before the healing process begins. You may also notice redness, swelling, tenderness, weeping and crusts or scabs. Healing time is approximately 10-14 days.    Blister - You may or may notice blistering from the freezing. If you develop an uncomfortable blister from today's treatment, you may gently puncture this with a needle that has been cleaned with alcohol. However, do not remove the protective skin layer of the blister.    Scab - After a few days, you may notice scaliness or scab formation. Do not pick at the scabs because this may cause slower healing and a permanent scar.    The skin may appear temporarily darker at the treatment site, but this usually fades over a period of months, provided that the area is protected from the sun.    Care of the areas treated:    Wash the area with a mild cleanser.    Gently pat dry.    Do not rub.     Keep protected from the sun during the healing process and for a full year following treatment as the skin continues to remodel during this time.    Apply Vaseline or Aquaphor ointment sparingly to the site for the first 7 days after treatment.    Do not use Neosporin, as many people eventually develop a medication allergy, that can easily be confused with an infection, to Neomycin.    Return if:  There should not be any residual scaling. If there is any concern that the lesion has persisted after 4-6 weeks, make an appointment for a re-check. Healing time does vary depending on your individual healing process and the area of the body treated. Most patients will be healed in one  month.    Signs of Infection:  Thankfully this is rare. However if you notice persistent colored drainage, increasing pain, fever or other signs of infection, please call us at: [phone]    The patient was counseled about sunscreens and sun avoidance. The patient was counseled to check the skin regularly and report any lesion that is new, changing, itching, scabbing, bleeding or otherwise bothersome. The patient was discharged ambulatory and in stable condition.      PROCEDURES:                                                    Name : Shave Excision  Indication : Excision of tissue for pathology evaluation.  Location(s) : Chest, 2 cm left of midline, at T2 : 3 mm in size, erythematous, indurated lesion with small hyperkeratotic center. ? Basal cell carcinoma ? Squamous cell carcinoma ? Hypertrophic actinic keratosis ? Other.  Completed by : La Thomas MD  Photo Taken : no.  Anesthesia : Patient was anesthetized by infiltrating the area surrounding the lesion with 1% lidocaine.   epinephrine [id number] : Yes.  Buffered with bicarbonate : Yes.  Note : Discussed the risk of pain, infection, scarring, hypo- or hyperpigmentation and recurrence or need for re-treatment. The benefits of treatment and alternative treatments were also discussed.    During this procedure, the universal protocol was utilized. The patient's identity was confirmed by no less than two patient identifiers, correct procedure was verified, correct site was verified and marked as applicable and a final pause was completed.    Sterile technique was used throughout the procedure. The skin was cleaned and prepped with surgical cleanser. Once adequate anesthesia was obtained, the lesion was removed with a deep scallop shave procedure. The specimen was sent to pathology.    Direct pressure and monsels's and monopolar cautery was applied for hemostasis. No bleeding was present upon the completion of the procedure. The wound was coated with  antibacterial ointment. A dry sterile dressing was applied. Patient tolerated the procedure well and left in satisfactory condition.    Primary provider and referring provider will be informed regarding the tissue report when it returns.      The information in this document, created by the medical scribe for me, accurately reflects the services I personally performed and the decisions made by me. I have reviewed and approved this document for accuracy prior to leaving the patient care area.  La Thomas MD November 7, 2017 3:29 PM  [facility] - PRIMARY CARE SKIN

## 2017-11-07 NOTE — LETTER
November 10, 2017    Shelley Mccrary  [address]  [place] [address]        Dear Shelley,    The lesion that was removed on the left chest was a benign lesion (not cancer) called an actinic keratosis. Actinic keratoses can be  potentially precancerous , therefore if this lesion should recur it should be reevaluated.     I have enclosed a copy of the tissue report.      Thank you for allowing me to be involved in your health care and for choosing [facility].  If you have any questions or concerns please feel free to contact me, [phone].      Sincerely,      Modesta Thomas M.D.

## 2017-11-07 NOTE — MR AVS SNAPSHOT
"              After Visit Summary   11/7/2017    Shelley Mccrary    MRN: [id number]           Patient Information     Date Of Birth          1950        Visit Information        Provider Department      11/7/2017 3:40 PM Modesta Thomas MD [facility] - Primary Care Skin        Today's Diagnoses     Neoplasm of uncertain behavior of skin    -  1    AK (actinic keratosis)        History of actinic keratoses        Skin tanning due to exposure to ultraviolet light        Seborrheic keratoses        Solar lentigo          Care Instructions    FUTURE APPOINTMENTS  Follow up in 1 year(s) for a full-body skin cancer screening.  Follow up per pathology report.    WOUND CARE INSTRUCTIONS  1. After 24 hours, change dressing daily.  2. Wash hands before every dressing change.  3. Wash the wound area with a mild soap, then rinse  4. Gently pat dry with a sterile gauze or Q-tip.  5. Apply Vaseline or Aquaphor only over entire wound. Do NOT use Neosporin - as many people react to neomycin.  6. Finally, cover with a bandage or sterile non-stick gauze with micropore paper tape.  7. Repeat once daily until wound has healed.    Do not let the wound dry out.  The wound will heal faster and with better cosmetic results if routinely kept moist with step #5. It is a false belief that a wound heals better when it is exposed to air and allowed to dry out.      Soap, water and shampoo will not hurt this area.    Do not go swimming or take baths, but showering is encouraged.    Limit use of the area where the procedure was done for a few days to allow for optimal healing.    If you experience bleeding:  Repeat steps #2-5 and hold firm pressure on the area for 10 minutes without checking to see if the bleeding has stopped. \"Checking\" pulls off the protective wound clot and restarts the bleeding all over again. Re-apply pressure for 10 minutes if necessary to stop bleeding.  Use additional sterile gauze and tape to " maintain pressure once bleeding has stopped.    Signs of Infection:  Infection can occur in any area where skin has been disrupted.  If you notice persistent redness, swelling, colored drainage, increasing pain, fever or other signs of infection, please call us at: [phone] and ask to have me or my colleague paged. We will call you back to discuss.    Pathology Results:  You will be notified, generally via letter or MyChart, in approximately 10 days. If there is anything we need to discuss or further treatment needed, I will call you to discuss it.    Skin tissue can be some of the most challenging tissue for pathologists to examine, therefore we may use a specialist outside the [employer] system to read certain submitted tissue samples. When submitted to these outside specialists, [facility] will notify you that your tissue sample result has returned. However, this only means that the tissue sample has been sent to the specialist. These results are not available in [employer], so I will contact you when I receive the final report.    PATIENT INFORMATION : WOUNDS  During the healing process you will notice a number of changes. All wounds develop a small halo of redness surrounding the wound.  This means healing is occurring. Severe itching with extensive redness usually indicates sensitivity to the ointment or bandage tape used to dress the wound.  You should call our office if this develops.      Swelling  and/or discoloration around your surgical site is common, particularly when performed around the eye.    All wounds normally drain.  The larger the wound the more drainage there will be.  After 7-10 days, you will notice the wound beginning to shrink and new skin will begin to grow.  The wound is healed when you can see skin has formed over the entire area.  A healed wound has a healthy, shiny look to the surface and is red to dark pink in color to normalize.  Wounds may take approximately 4-6 weeks to heal.   "Larger wounds may take 6-8 weeks. After the wound is healed you may discontinue dressing changes.    You may experience a sensation of tightness as your wound heals. This is normal and will gradually subside.    Your healed wound may be sensitive to temperature changes. This sensitivity improves with time, but if you re having a lot of discomfort, try to avoid temperature extremes.    Patients frequently experience itching after their wound appears to have healed because of the continue healing under the skin.  Plain Vaseline will help relieve the itching.    SUN PROTECTION INSTRUCTIONS  Sun damage can lead to skin cancer and premature aging of the skin.      The best way to protect from sun damage to your skin is to avoid the sun during peak hours (10 am - 2 pm) even on overcast days.      Use UPF sun-protective clothing, which while more expensive initially provides longer lasting coverage without having to worry about remembering to re-apply.  1. Wear a wide-brimmed hat and sunglasses.   2. Wear sun-protective clothing.  [employer] other [employer] make sun protective clothing that are stylish, comfortable and cool. Mashups and other [employer] make UV arm sleeves suitable for golfing, gardening and other activities.      Sunscreen instructions:  1. Use sunscreens with Zinc Oxide, Titanium Dioxide or Avobenzone to protect from UVA rays.  2. Use SPF 30-50+ to protect from UVB rays.  3. Re-apply every 2 hours even if water resistant.  4. Apply on your face every day even when cloudy and even in the winter. UVA \"aging rays\" penetrate window glass and are just as strong in the winter as in the summer.    Product Recommendations:    Good examples include: Booker Aguilar, EltaMD, Solbar    Good daily moisturizers with SPF: Vanicream, CeraVe.    For sensitive skin, consider : SkinMedica Essential Defense Mineral Shield Broad Spectrum SPF 35      Never use tanning beds. Tanning beds are associated with much " "higher risks of skin cancer.    All tanning damages the skin. Aim for ivory skin year round and you will have less trouble with your skin in years to come. There is no merit in getting \"a base tan\" before a warm weather vacation, as any tanning indicates your body's response to sun damage.    Stop smoking. Smokers have higher rates of skin cancer and also have premature skin wrinkling.    FYI  You should use about 3 tablespoons of sunscreen to protect your whole body. Thus a typical eight ounce bottle of sunscreen should last 4 applications. Remember, that the SPF rating is compromised if you don t apply enough. Most people only apply 1/2 - 1/3 of the amount they need. Also don t forget areas such as your ears, feet, upper back and harder to reach places. Keep in mind that these amounts should be increased for larger body sizes.    Sunscreens with titanium dioxide and/or zinc oxide in the active ingredients are physical blockers as opposed to chemical blockers. Chemical-free sunscreens should not irritate the skin.    Spray-on sunscreens may be used for touch-up application only, not as a base layer. Also, use with caution around small children due to inhalation risk.    Avoid retinyl palmitate products.    Avoid combination products that include both sunscreen and insect repellant, as sunscreen should be applied every 2 hours, but insect repellant should not be applied as frequently.    SPF means sun protection factor, which is just the degree to which the sunscreen can protect against UVB rays. There is no rating system for UVA rays. SPF is calculated as the time skin will burn when sunscreen is applied vs. skin without sunscreen.    Water resistant sunscreens should be re-applied every 1-2 hours.    For more information:  http://www.skincancer.org/prevention/sun-protection/sunscreen/sunscreens-safe-and-effective    CRYOTHERAPY FOR ACTINIC KERATOSES POST-TREATMENT CARE INSTRUCTIONS  Actinic keratoses are benign, " scaly or gritty lesions that appear in sun-exposed areas and may progress to skin cancers. For this reason, it is important to treat them before they become cancerous.  Liquid nitrogen is mildly uncomfortable when applied to the skin, but the discomfort rapidly subsides.    Post-Treatment:  You may experience burning and/or stinging immediately following the procedure. The discomfort from the procedure may persist over the next 12-24 hours. The area treated will look pinker and slightly swollen before the healing process begins. You may also notice redness, swelling, tenderness, weeping and crusts or scabs. Healing time is approximately 10-14 days.    Blister - You may or may notice blistering from the freezing. If you develop an uncomfortable blister from today's treatment, you may gently puncture this with a needle that has been cleaned with alcohol. However, do not remove the protective skin layer of the blister.    Scab - After a few days, you may notice scaliness or scab formation. Do not pick at the scabs because this may cause slower healing and a permanent scar.    The skin may appear temporarily darker at the treatment site, but this usually fades over a period of months, provided that the area is protected from the sun.    Care of the areas treated:    Wash the area with a mild cleanser.    Gently pat dry.    Do not rub.     Keep protected from the sun during the healing process and for a full year following treatment as the skin continues to remodel during this time.    Apply Vaseline or Aquaphor ointment sparingly to the site for the first 7 days after treatment.    Do not use Neosporin, as many people eventually develop a medication allergy, that can easily be confused with an infection, to Neomycin.    Return if:  There should not be any residual scaling. If there is any concern that the lesion has persisted after 4-6 weeks, make an appointment for a re-check. Healing time does vary depending on your  individual healing process and the area of the body treated. Most patients will be healed in one month.    Signs of Infection:  Thankfully this is rare. However if you notice persistent colored drainage, increasing pain, fever or other signs of infection, please call us at: [phone]    TOPICAL MEDICATION  Consider applying a thin film to the affected areas on the face hydroquinone 4% cream nightly at bedtime.  Make sure to apply sunscreen every day on the face with re-application throughout the day as sun exposure will counteract any benefits from this topical medication.            Follow-ups after your visit        Your next 10 appointments already scheduled     Nov 09, 2017 12:50 PM CST   [facility] Spine with Hanane Lilly, PT   [place] [facility]  [place] [place] [facility] ([facility] [place])    [address] [place] [address]  [id number]  [place] [address] [phone]              Who to contact     If you have questions or need follow up information about today's clinic visit or your schedule please contact [facility] - PRIMARY CARE SKIN directly at [phone].  Normal or non-critical lab and imaging results will be communicated to you by [employer], letter or phone within 4 business days after the clinic has received the results. If you do not hear from us within 7 days, please contact the clinic through [employer] or phone. If you have a critical or abnormal lab result, we will notify you by phone as soon as possible.  Submit refill requests through [employer] or call your pharmacy and they will forward the refill request to us. Please allow 3 business days for your refill to be completed.          Additional Information About Your Visit        [employer] Information     [employer] gives you secure access to your electronic health record. If you see a primary care provider, you can also send messages to your care team and make appointments. If you have questions, please call your primary care clinic.   If you do not have a primary care provider, please call [phone] and they will assist you.        Care EveryWhere ID     This is your Care EveryWhere ID. This could be used by other organizations to access your [facility] medical records  [id number]         Blood Pressure from Last 3 Encounters:   10/10/17 118/77   08/28/17 136/64   06/01/17 118/68    Weight from Last 3 Encounters:   10/10/17 137 lb (62.1 kg)   08/28/17 137 lb (62.1 kg)   06/01/17 136 lb (61.7 kg)              We Performed the Following     CL SURG PATH [facility] DERM     DESTRUCT PREMALIGNANT LESION, 2-14     DESTRUCT PREMALIGNANT LESION, FIRST     SHAV SKIN LESION TRUNK/ARM/LEG <=0.5 CM          Where to get your medicines      These medications were sent to [id number] - [place] - [address] AT [facility] OF [address] access [address] [facility]  [address] [facility] [place], [place] [address]     Phone:  [phone]     atorvastatin 40 MG tablet          Primary Care Provider Office Phone # Fax #    Fabiola Paresh Mahmood [phone]       [address]  [place] [address]        Equal Access to Services     [facility]: Hadii aad ku hadasho Soomaali, waaxda luqadaha, qaybta kaalmada adeegyada, waxay clifford haychad clarke . So [facility] [phone].    ATENCIÓN: Si habla español, tiene a hylton disposición servicios gratuitos de asistencia lingüística. Llame al [phone].    We comply with applicable federal civil rights laws and [place] laws. We do not discriminate on the basis of race, color, national origin, age, disability, sex, sexual orientation, or gender identity.            Thank you!     Thank you for choosing [facility] - PRIMARY CARE SKIN  for your care. Our goal is always to provide you with excellent care. Hearing back from our patients is one way we can continue to improve our services. Please take a few minutes to complete the written survey that you may  receive in the mail after your visit with us. Thank you!             Your Updated Medication List - Protect others around you: Learn how to safely use, store and throw away your medicines at www.disposemymeds.org.          This list is accurate as of: 11/7/17  3:58 PM.  Always use your most recent med list.                   Brand Name Dispense Instructions for use Diagnosis    aspirin 81 MG tablet      Take by mouth daily        atorvastatin 40 MG tablet    LIPITOR    90 tablet    TAKE 1 TABLET BY MOUTH DAILY    Hyperlipidemia LDL goal <70       clobetasol 0.05 % cream    TEMOVATE          MELATONIN PO      Take 1 mg by mouth At Bedtime        metoprolol 25 MG 24 hr tablet    TOPROL-XL    90 tablet    Take 1 tablet (25 mg) by mouth daily    Cardiomyopathy, unspecified       MULTI-VITAMINS Tabs      Take 1 tablet by mouth        nabumetone 500 MG tablet    RELAFEN    30 tablet    Take 1-2 tablets (500-1,000 mg) by mouth 2 times daily as needed for moderate pain    Midline low back pain without sciatica, unspecified chronicity, Left foot pain       nitroGLYcerin 0.4 MG sublingual tablet    NITROSTAT    25 tablet    Place 1 tablet (0.4 mg) under the tongue every 5 minutes as needed for chest pain If you are still having symptoms after 3 doses (15 minutes) call 911.    CAD (coronary artery disease), S/P angioplasty with stent       OMEGA-3 FISH OIL PO      Take 2 g by mouth daily        sertraline 25 MG tablet    ZOLOFT    90 tablet    Take 1 tablet (25 mg) by mouth daily    Adjustment disorder with mixed anxiety and depressed mood

## 2017-11-09 ENCOUNTER — THERAPY VISIT (OUTPATIENT)
Dept: PHYSICAL THERAPY | Facility: CLINIC | Age: 67
End: 2017-11-09
Payer: MEDICARE

## 2017-11-09 DIAGNOSIS — M54.50 MIDLINE LOW BACK PAIN WITHOUT SCIATICA: ICD-10-CM

## 2017-11-09 PROCEDURE — 97140 MANUAL THERAPY 1/> REGIONS: CPT | Mod: GP | Performed by: PHYSICAL THERAPIST

## 2017-11-09 PROCEDURE — 97110 THERAPEUTIC EXERCISES: CPT | Mod: GP | Performed by: PHYSICAL THERAPIST

## 2017-11-10 NOTE — PROGRESS NOTES
ADDENDUM:                                                    November 10, 2017 9:36 AM  Pathology report results:

## 2017-11-30 ENCOUNTER — THERAPY VISIT (OUTPATIENT)
Dept: PHYSICAL THERAPY | Facility: CLINIC | Age: 67
End: 2017-11-30
Payer: MEDICARE

## 2017-11-30 DIAGNOSIS — M54.50 MIDLINE LOW BACK PAIN WITHOUT SCIATICA: ICD-10-CM

## 2017-11-30 PROCEDURE — G8982 BODY POS GOAL STATUS: HCPCS | Mod: GP | Performed by: PHYSICAL THERAPIST

## 2017-11-30 PROCEDURE — G8983 BODY POS D/C STATUS: HCPCS | Mod: GP | Performed by: PHYSICAL THERAPIST

## 2017-11-30 PROCEDURE — 97112 NEUROMUSCULAR REEDUCATION: CPT | Mod: GP | Performed by: PHYSICAL THERAPIST

## 2017-11-30 PROCEDURE — 97110 THERAPEUTIC EXERCISES: CPT | Mod: GP | Performed by: PHYSICAL THERAPIST

## 2017-11-30 NOTE — MR AVS SNAPSHOT
After Visit Summary   11/30/2017    Shelley Mccrary    MRN: [id number]           Patient Information     Date Of Birth          1950        Visit Information        Provider Department      11/30/2017 2:10 PM Hanane Lilly, [facility] [place] [facility]        Today's Diagnoses     Midline low back pain without sciatica           Follow-ups after your visit        Your next 10 appointments already scheduled     Dec 07, 2017 10:00 AM CST   PHYSICAL with Fabiola Mahmood MD   [facility] ([facility])    [address]  [facility] [address]   [phone]            Dec 18, 2017  2:50 PM CST   OLV[facility] Spine with Hanane Lilly PT   [place] [facility] ([facility])    [address] [place] [address]  [id number]  [place] [address] [phone]              Who to contact     If you have questions or need follow up information about today's clinic visit or your schedule please contact [facility] ATHLETIC [facility] PHYS[facility]AP[facility] directly at [phone].  Normal or non-critical lab and imaging results will be communicated to you by [employer], letter or phone within 4 business days after the clinic has received the results. If you do not hear from us within 7 days, please contact the clinic through [employer] or phone. If you have a critical or abnormal lab result, we will notify you by phone as soon as possible.  Submit refill requests through [employer] or call your pharmacy and they will forward the refill request to us. Please allow 3 business days for your refill to be completed.          Additional Information About Your Visit        [employer] Information     [employer] gives you secure access to your electronic health record. If you see a primary care provider, you can also send messages to your care team and make appointments. If you have  questions, please call your primary care clinic.  If you do not have a primary care provider, please call [phone] and they will assist you.        Care EveryWhere ID     This is your Care EveryWhere ID. This could be used by other organizations to access your [facility] medical records  [id number]         Blood Pressure from Last 3 Encounters:   10/10/17 118/77   08/28/17 136/64   06/01/17 118/68    Weight from Last 3 Encounters:   10/10/17 62.1 kg (137 lb)   08/28/17 62.1 kg (137 lb)   06/01/17 61.7 kg (136 lb)              We Performed the Following     OLVIN PROGRESS NOTES REPORT     NEUROMUSCULAR RE-EDUCATION     THERAPEUTIC EXERCISES        Primary Care Provider Office Phone # Fax #    Fabiola Paresh Mahmood [phone]       [address]  [place] [address]        Equal Access to Services     [facility]: Hadii aad ku hadasho Soomaali, waaxda luqadaha, qaybta kaalmada adeegyada, waxay idiin hayaan abrahan lopezn . So [facility] [phone].    ATENCIÓN: Si habla español, tiene a hylton disposición servicios gratuitos de asistencia lingüística. Destinee al [phone].    We comply with applicable federal civil rights laws and [place] laws. We do not discriminate on the basis of race, color, national origin, age, disability, sex, sexual orientation, or gender identity.            Thank you!     Thank you for choosing [facility]  for your [facility]. Our goal is always to provide you with excellent care. Hearing back from our patients is one way we can continue to improve our services. Please take a few minutes to complete the written survey that you may receive in the mail after your visit with us. Thank you!             Your Updated Medication List - Protect others around you: Learn how to safely use, store and throw away your medicines at www.disposemymeds.org.          This list is accurate as of: 11/30/17 11:59 PM.  Always use your  most recent med list.                   Brand Name Dispense Instructions for use Diagnosis    aspirin 81 MG tablet      Take by mouth daily        atorvastatin 40 MG tablet    LIPITOR    90 tablet    TAKE 1 TABLET BY MOUTH DAILY    Hyperlipidemia LDL goal <70       clobetasol 0.05 % cream    TEMOVATE          MELATONIN PO      Take 1 mg by mouth At Bedtime        metoprolol 25 MG 24 hr tablet    TOPROL-XL    90 tablet    Take 1 tablet (25 mg) by mouth daily    Cardiomyopathy, unspecified       MULTI-VITAMINS Tabs      Take 1 tablet by mouth        nabumetone 500 MG tablet    RELAFEN    30 tablet    Take 1-2 tablets (500-1,000 mg) by mouth 2 times daily as needed for moderate pain    Midline low back pain without sciatica, unspecified chronicity, Left foot pain       nitroGLYcerin 0.4 MG sublingual tablet    NITROSTAT    25 tablet    Place 1 tablet (0.4 mg) under the tongue every 5 minutes as needed for chest pain If you are still having symptoms after 3 doses (15 minutes) call 911.    CAD (coronary artery disease), S/P angioplasty with stent       OMEGA-3 FISH OIL PO      Take 2 g by mouth daily        sertraline 25 MG tablet    ZOLOFT    90 tablet    Take 1 tablet (25 mg) by mouth daily    Adjustment disorder with mixed anxiety and depressed mood

## 2017-12-01 ENCOUNTER — TELEPHONE (OUTPATIENT)
Dept: NURSING | Facility: CLINIC | Age: 67
End: 2017-12-01

## 2017-12-01 DIAGNOSIS — M54.50 MIDLINE LOW BACK PAIN WITHOUT SCIATICA, UNSPECIFIED CHRONICITY: ICD-10-CM

## 2017-12-01 NOTE — PROGRESS NOTES
Subjective:    HPI                    Objective:    System    Physical Exam    General     ROS    Assessment/Plan:      DISCHARGE  REPORT    Progress reporting period is from 10/16/17 to 11/3017    SUBJECTIVE  Subjective changes noted by patient:  Subjective: Shelley is doing well with decreased back pain. She always has pain in her right thigh and states it is gradually gotten weak since last year.     Current pain level is  Current Pain level: 0/10.     Previous pain level was    .   Changes in function:  Yes (See Goal flowsheet attached for changes in current functional level)  Adverse reaction to treatment or activity: None    OBJECTIVE  Changes noted in objective findings:  Yes,   Objective: Advised her to meet with her provider to take MRI for further evaluation.     ASSESSMENT/PLAN  Updated problem list and treatment plan: Diagnosis 1:  LBP  Decreased strength - therapeutic exercise, therapeutic activities and home program  Decreased function - therapeutic activities and home program  STG/LTGs have been met or progress has been made towards goals:  Yes (See Goal flow sheet completed today.)  Assessment of Progress: The patient's condition is improving.  The patient's condition has potential to improve.  Patient is meeting short term goals and is progressing towards long term goals.  Self Management Plans:  Patient has been instructed in a home treatment program.  Patient  has been instructed in self management of symptoms.  I have re-evaluated this patient and find that the nature, scope, duration and intensity of the therapy is appropriate for the medical condition of the patient.  Shelley continues to require the following intervention to meet STG and LTG's:  PT    Recommendations:  This patient would benefit from further evaluation.   Patient midline LBP has improved but she still feels weak in her right thigh, advised patient to meet with her provider for further evaluation.    Please refer to the daily  flowsheet for treatment today, total treatment time and time spent performing 1:1 timed codes.

## 2017-12-01 NOTE — TELEPHONE ENCOUNTER
Patient calling and states was at PT yesterday and they advised her to have an MRI and told her they would send over documents to provider.  Patient wanted to make provider aware in hopes she could have completed prior to her appointment next Thursday so could be reviewed then.  Christine Serna RN - Triage  [facility]

## 2017-12-06 NOTE — TELEPHONE ENCOUNTER
Here is the last PT note     Recommendations:  This patient would benefit from further evaluation.   Patient midline LBP has improved but she still feels weak in her right thigh, advised patient to meet with her provider for further evaluation.

## 2017-12-06 NOTE — TELEPHONE ENCOUNTER
Per PT, Does she need mri lumber spine ? Or hip, bc I see that she is having some thigh issues as well ? Order ca be placed otherwise she can wait till I see her and then decide

## 2017-12-06 NOTE — TELEPHONE ENCOUNTER
Patient stated that PT would be notifying provider of the recommendation.  Christine Serna RN - Triage  [facility]

## 2017-12-07 ENCOUNTER — TELEPHONE (OUTPATIENT)
Dept: OTHER | Facility: CLINIC | Age: 67
End: 2017-12-07

## 2017-12-07 ENCOUNTER — OFFICE VISIT (OUTPATIENT)
Dept: FAMILY MEDICINE | Facility: CLINIC | Age: 67
End: 2017-12-07
Payer: MEDICARE

## 2017-12-07 VITALS
OXYGEN SATURATION: 99 % | DIASTOLIC BLOOD PRESSURE: 77 MMHG | SYSTOLIC BLOOD PRESSURE: 124 MMHG | HEART RATE: 68 BPM | TEMPERATURE: 97.2 F | BODY MASS INDEX: 25.68 KG/M2 | HEIGHT: 61 IN | WEIGHT: 136 LBS

## 2017-12-07 DIAGNOSIS — I73.9 PAD (PERIPHERAL ARTERY DISEASE) (H): Primary | ICD-10-CM

## 2017-12-07 DIAGNOSIS — Z12.11 COLON CANCER SCREENING: ICD-10-CM

## 2017-12-07 DIAGNOSIS — M54.50 MIDLINE LOW BACK PAIN WITHOUT SCIATICA, UNSPECIFIED CHRONICITY: ICD-10-CM

## 2017-12-07 DIAGNOSIS — M79.605 LEG PAIN, BILATERAL: ICD-10-CM

## 2017-12-07 DIAGNOSIS — Z00.00 ROUTINE HISTORY AND PHYSICAL EXAMINATION OF ADULT: Primary | ICD-10-CM

## 2017-12-07 DIAGNOSIS — E55.9 VITAMIN D DEFICIENCY: ICD-10-CM

## 2017-12-07 DIAGNOSIS — M79.604 LEG PAIN, BILATERAL: ICD-10-CM

## 2017-12-07 PROCEDURE — G0439 PPPS, SUBSEQ VISIT: HCPCS | Performed by: FAMILY MEDICINE

## 2017-12-07 PROCEDURE — 82306 VITAMIN D 25 HYDROXY: CPT | Performed by: FAMILY MEDICINE

## 2017-12-07 PROCEDURE — 80053 COMPREHEN METABOLIC PANEL: CPT | Performed by: FAMILY MEDICINE

## 2017-12-07 PROCEDURE — 99212 OFFICE O/P EST SF 10 MIN: CPT | Mod: 25 | Performed by: FAMILY MEDICINE

## 2017-12-07 PROCEDURE — 83735 ASSAY OF MAGNESIUM: CPT | Performed by: FAMILY MEDICINE

## 2017-12-07 PROCEDURE — 36415 COLL VENOUS BLD VENIPUNCTURE: CPT | Performed by: FAMILY MEDICINE

## 2017-12-07 ASSESSMENT — ANXIETY QUESTIONNAIRES
1. FEELING NERVOUS, ANXIOUS, OR ON EDGE: NOT AT ALL
7. FEELING AFRAID AS IF SOMETHING AWFUL MIGHT HAPPEN: NOT AT ALL
2. NOT BEING ABLE TO STOP OR CONTROL WORRYING: NOT AT ALL
5. BEING SO RESTLESS THAT IT IS HARD TO SIT STILL: NOT AT ALL
6. BECOMING EASILY ANNOYED OR IRRITABLE: NOT AT ALL
GAD7 TOTAL SCORE: 0
3. WORRYING TOO MUCH ABOUT DIFFERENT THINGS: NOT AT ALL

## 2017-12-07 ASSESSMENT — PATIENT HEALTH QUESTIONNAIRE - PHQ9
SUM OF ALL RESPONSES TO PHQ QUESTIONS 1-9: 0
5. POOR APPETITE OR OVEREATING: NOT AT ALL

## 2017-12-07 NOTE — NURSING NOTE
"Chief Complaint   Patient presents with     Physical       Initial /77  Pulse 68  Temp 97.2  F (36.2  C) (Tympanic)  Ht 5' 0.75\" (1.543 m)  Wt 136 lb (61.7 kg)  SpO2 99%  BMI 25.91 kg/m2 Estimated body mass index is 25.91 kg/(m^2) as calculated from the following:    Height as of this encounter: 5' 0.75\" (1.543 m).    Weight as of this encounter: 136 lb (61.7 kg).  Medication Reconciliation: complete  "

## 2017-12-07 NOTE — TELEPHONE ENCOUNTER
Pt referred to [facility] by  for bilateral lower extremity pain, worse with walking.    Pt needs to be scheduled for an VARSHA with exercise and a consult with Vascular Medicine (no imaging if scheduled with ).  Will route to scheduling to coordinate an appointment next available.    Darcy Armstrong RN BSN

## 2017-12-07 NOTE — MR AVS SNAPSHOT
After Visit Summary   12/7/2017    Shelley Mccrary    MRN: [id number]           Patient Information     Date Of Birth          1950        Visit Information        Provider Department      12/7/2017 10:00 AM Fabiola Mahmood [place] [facility] [place]        Today's Diagnoses     Routine history and physical examination of adult    -  1    Midline low back pain without sciatica, unspecified chronicity        Leg pain, bilateral        Colon cancer screening        Vitamin D deficiency          Care Instructions      Preventive Health Recommendations    Female Ages 65 +    Yearly exam:     See your health care provider every year in order to  o Review health changes.   o Discuss preventive care.    o Review your medicines if your doctor has prescribed any.      You no longer need a yearly Pap test unless you've had an abnormal Pap test in the past 10 years. If you have vaginal symptoms, such as bleeding or discharge, be sure to talk with your provider about a Pap test.      Every 1 to 2 years, have a mammogram.  If you are over 69, talk with your health care provider about whether or not you want to continue having screening mammograms.      Every 10 years, have a colonoscopy. Or, have a yearly FIT test (stool test). These exams will check for colon cancer.       Have a cholesterol test every 5 years, or more often if your doctor advises it.       Have a diabetes test (fasting glucose) every three years. If you are at risk for diabetes, you should have this test more often.       At age 65, have a bone density scan (DEXA) to check for osteoporosis (brittle bone disease).    Shots:    Get a flu shot each year.    Get a tetanus shot every 10 years.    Talk to your doctor about your pneumonia vaccines. There are now two you should receive - Pneumovax (PPSV 23) and Prevnar (PCV 13).    Talk to your doctor about the shingles vaccine.    Talk to your doctor about the hepatitis B  vaccine.    Nutrition:     Eat at least 5 servings of fruits and vegetables each day.      Eat whole-grain bread, whole-wheat pasta and brown rice instead of white grains and rice.      Talk to your provider about Calcium and Vitamin D.     Lifestyle    Exercise at least 150 minutes a week (30 minutes a day, 5 days a week). This will help you control your weight and prevent disease.      Limit alcohol to one drink per day.      No smoking.       Wear sunscreen to prevent skin cancer.       See your dentist twice a year for an exam and cleaning.      See your eye doctor every 1 to 2 years to screen for conditions such as glaucoma, macular degeneration and cataracts.          Follow-ups after your visit        Additional Services     VASCULAR REFERRAL       Your provider has referred you to the [facility].has ongoing recurrent leg leg pain, feels worse with walking     Reason for Referral: Vascular Medicine    Urgency of Referral: Next available    A referral has been sent to our Vascular  Services. If you do not receive a call from them within 24-48 hours you may reach them at [phone].    Please be aware that coverage of these services is subject to the terms and limitations of your health insurance plan.  Call member services at your health plan with any benefit or coverage questions.      Please bring the following with you to your appointment:  (1) Any X-Rays, CTs or MRIs which have been performed.  Contact the facility where they were done to arrange for  prior to your scheduled appointment.  Any new CT, MRI or other procedures ordered by your specialist must be performed at a [place] facility or coordinated by your clinic's referral office.    (2) List of current medications   (3) This referral request   (4) Any documents/labs given to you for this referral                  Your next 10 appointments already scheduled     Dec 18, 2017  2:50 PM CST   [facility] with  "Hanane Lilly, PT   [facility] ([facility] [place])    [address] [place] [address]  [id number]  [place] [address] [phone]              Future tests that were ordered for you today     Open Future Orders        Priority Expected Expires Ordered    Fecal colorectal cancer screen (FIT) Routine 12/28/2017 3/1/2018 12/7/2017            Who to contact     If you have questions or need follow up information about today's clinic visit or your schedule please contact [facility] [place] directly at [phone].  Normal or non-critical lab and imaging results will be communicated to you by Attunehart, letter or phone within 4 business days after the clinic has received the results. If you do not hear from us within 7 days, please contact the clinic through Pitchbritet or phone. If you have a critical or abnormal lab result, we will notify you by phone as soon as possible.  Submit refill requests through JiaThis or call your pharmacy and they will forward the refill request to us. Please allow 3 business days for your refill to be completed.          Additional Information About Your Visit        AttuneharClipabout Information     JiaThis gives you secure access to your electronic health record. If you see a primary care provider, you can also send messages to your care team and make appointments. If you have questions, please call your primary care clinic.  If you do not have a primary care provider, please call [phone] and they will assist you.        Care EveryWhere ID     This is your Care EveryWhere ID. This could be used by other organizations to access your [facility] medical records  [id number]        Your Vitals Were     Pulse Temperature Height Pulse Oximetry BMI (Body Mass Index)       68 97.2  F (36.2  C) (Tympanic) 5' 0.75\" (1.543 m) 99% 25.91 kg/m2        Blood Pressure from Last 3 Encounters:   12/07/17 124/77   10/10/17 118/77   08/28/17 136/64    Weight " from Last 3 Encounters:   12/07/17 136 lb (61.7 kg)   10/10/17 137 lb (62.1 kg)   08/28/17 137 lb (62.1 kg)              We Performed the Following     Comprehensive metabolic panel     VASCULAR REFERRAL     Vitamin D Deficiency        Primary Care Provider Office Phone # Fax #    Fabiola Paresh Mahmood [phone]       [address]  [place] [address]        Equal Access to Services     [facility]: Hadii aad ku hadasho Soomaali, waaxda luqadaha, qaybta kaalmada adeegyada, waxay idiin hayaan adeeg kharash la'aan . So [facility] [phone].    ATENCIÓN: Si habla español, tiene a hylton disposición servicios gratuitos de asistencia lingüística. [facility] [phone].    We comply with applicable federal civil rights laws and [place] laws. We do not discriminate on the basis of race, color, national origin, age, disability, sex, sexual orientation, or gender identity.            Thank you!     Thank you for choosing [facility] [place]  for your care. Our goal is always to provide you with excellent care. Hearing back from our patients is one way we can continue to improve our services. Please take a few minutes to complete the written survey that you may receive in the mail after your visit with us. Thank you!             Your Updated Medication List - Protect others around you: Learn how to safely use, store and throw away your medicines at www.disposemymeds.org.          This list is accurate as of: 12/7/17 10:47 AM.  Always use your most recent med list.                   Brand Name Dispense Instructions for use Diagnosis    aspirin 81 MG tablet      Take by mouth daily        atorvastatin 40 MG tablet    LIPITOR    90 tablet    TAKE 1 TABLET BY MOUTH DAILY    Hyperlipidemia LDL goal <70       clobetasol 0.05 % cream    TEMOVATE          MELATONIN PO      Take 1 mg by mouth At Bedtime        metoprolol 25 MG 24 hr tablet    TOPROL-XL    90 tablet    Take 1 tablet (25 mg) by mouth  daily    Cardiomyopathy, unspecified       MULTI-VITAMINS Tabs      Take 1 tablet by mouth        nitroGLYcerin 0.4 MG sublingual tablet    NITROSTAT    25 tablet    Place 1 tablet (0.4 mg) under the tongue every 5 minutes as needed for chest pain If you are still having symptoms after 3 doses (15 minutes) call 911.    CAD (coronary artery disease), S/P angioplasty with stent       OMEGA-3 FISH OIL PO      Take 2 g by mouth daily        sertraline 25 MG tablet    ZOLOFT    90 tablet    Take 1 tablet (25 mg) by mouth daily    Adjustment disorder with mixed anxiety and depressed mood

## 2017-12-07 NOTE — PROGRESS NOTES
SUBJECTIVE:   Shelley Mccrary is a 67 year old female who presents for Preventive Visit.  click delete button to remove this line now  click delete button to remove this line now  Are you in the first 12 months of your Medicare Part B coverage?  No    Healthy Habits:    Do you get at least three servings of calcium containing foods daily (dairy, green leafy vegetables, etc.)? yes    Amount of exercise or daily activities, outside of work: 7 day(s) per week    Problems taking medications regularly No    Medication side effects: No    Have you had an eye exam in the past two years? yes    Do you see a dentist twice per year? Once     Do you have sleep apnea, excessive snoring or daytime drowsiness?no    COGNITIVE SCREEN  1) Repeat 3 items (Banana, Sunrise, Chair)    2) Clock draw: NORMAL  3) 3 item recall: Recalls 3 objects  Results: 3 items recalled: COGNITIVE IMPAIRMENT LESS LIKELY    Mini-CogTM Copyright S Geetha. Licensed by the author for use in [facility]; reprinted with permission (tonio@[facility]). All rights reserved.        PROBLEMS TO ADD ON...    Has ongoing issue with hip/back pain etc.  she had seen PT, over all her back has improved. Still has achy legs especially high. Comes  and goes and it moves around sometimes it could rt or left or thigh or calf etc, more noticeable when is she is walking or physically active. No redness, no swelling redness, no numbness or tingling etc. But here to do follow up and debating is she needs mri of back. Her pain is mild now and she over all thinks  she is better but just wanted to get checked. She does not thinks she needs any stronger med's for that   Had low vit-d , so need to have follow up  check. Not taking as much supplement .             Reviewed and updated as needed this visit by clinical staff         Reviewed and updated as needed this visit by Provider      Social History   Substance Use Topics     Smoking status: Former Smoker     Types:  Cigarettes     Quit date: 1/1/1990     Smokeless tobacco: Never Used      Comment: smoked 1-2 pack week, 30 years     Alcohol use No       The patient does not drink >3 drinks per day nor >7 drinks per week.     Today's PHQ-2 Score:   PHQ-2 ( 1999 Pfizer) 12/7/2017 11/11/2016   Q1: Little interest or pleasure in doing things 0 -   Q2: Feeling down, depressed or hopeless 0 -   PHQ-2 Score 0 -   Q1: Little interest or pleasure in doing things - Not at all   Q2: Feeling down, depressed or hopeless - Several days   PHQ-2 Score - 1         Do you feel safe in your environment - Yes    Do you have a Health Care Directive?: Yes: Advance Directive has been received and scanned.    Current providers sharing in care for this patient include: Patient Care Team:  Fabiola Mahmood MD as PCP - General (Family Practice)      Hearing impairment: Yes, disease that resulted in ear surgery     Ability to successfully perform activities of daily living: Yes, no assistance needed     Fall risk:  Fallen 2 or more times in the past year?: No  Any fall with injury in the past year?: No      Home safety:  none identified  click delete button to remove this line now    The following health maintenance items are reviewed in Epic and correct as of today:Health Maintenance   Topic Date Due     HF ACTION PLAN Q3 YR  1950     CBC Q1 YR  03/31/2017     KALI QUESTIONNAIRE 6 MONTHS  10/27/2017     PHQ-9 Q6 MONTHS  10/27/2017     FALL RISK ASSESSMENT  11/14/2017     COLON CANCER SCREEN (SYSTEM ASSIGNED)  11/15/2017     BMP Q6 MOS  04/10/2018     DEPRESSION ACTION PLAN Q1 YR  06/08/2018     CMP Q1 YR  10/10/2018     LIPID MONITORING Q1 YEAR  10/10/2018     MAMMO SCREEN Q2 YR (SYSTEM ASSIGNED)  11/16/2018     ADVANCE DIRECTIVE PLANNING Q5 YRS  07/29/2020     TETANUS IMMUNIZATION (SYSTEM ASSIGNED)  11/14/2026     INFLUENZA VACCINE (SYSTEM ASSIGNED)  Completed     DEXA SCAN SCREENING (SYSTEM ASSIGNED)  Addressed     PNEUMOCOCCAL  Completed      "HEPATITIS C SCREENING  Completed         Pneumonia Vaccine:Adults age 65+ who received Pneumovax (PPSV23) at 65 years or older: Should be given PCV13 > 1 year after their most recent PPSV23  Mammogram Screening: Patient over age 50, mutual decision to screen reflected in health maintenance.    ROS:  C: NEGATIVE for fever, chills, change in weight  I: NEGATIVE for worrisome rashes, moles or lesions  E: NEGATIVE for vision changes or irritation  E/M: NEGATIVE for ear, mouth and throat problems  R: NEGATIVE for significant cough or SOB  B: NEGATIVE for masses, tenderness or discharge  CV: NEGATIVE for chest pain, palpitations or peripheral edema  GI: NEGATIVE for nausea, abdominal pain, heartburn, or change in bowel habits  : NEGATIVE for frequency, dysuria, or hematuria  MUSCULOSKELETAL:POSITIVE  for muscle pain on and off  and myalgia and NEGATIVE for arthralgias , joint swelling , joint warmth , muscle weakness , paresthesias and radicular pain   N: NEGATIVE for weakness, dizziness or paresthesias  E: NEGATIVE for temperature intolerance, skin/hair changes  H: NEGATIVE for bleeding problems  P: NEGATIVE for changes in mood or affect    OBJECTIVE:   There were no vitals taken for this visit. Estimated body mass index is 26.1 kg/(m^2) as calculated from the following:    Height as of 10/10/17: 5' 0.75\" (1.543 m).    Weight as of 10/10/17: 137 lb (62.1 kg).  EXAM:   GENERAL APPEARANCE: healthy, alert and no distress  EYES: Eyes grossly normal to inspection, PERRL and conjunctivae and sclerae normal  HENT: ear canals and TM's normal, nose and mouth without ulcers or lesions, oropharynx clear and oral mucous membranes moist  NECK: no adenopathy, no asymmetry, masses, or scars and thyroid normal to palpation  RESP: lungs clear to auscultation - no rales, rhonchi or wheezes  BREAST: normal without masses, tenderness or nipple discharge and no palpable axillary masses or adenopathy  CV: regular rate and rhythm, normal S1 " "S2, no S3 or S4, no murmur, click or rub, no peripheral edema and peripheral pulses feel ok   ABDOMEN: soft, nontender, no hepatosplenomegaly, no masses and bowel sounds normal   (female): deferred  MS: no musculoskeletal defects are noted, gait is age appropriate ,  range of motion of back hip and knee is normal in all extremities, no calf swelling or tenderness,   SKIN: no suspicious lesions or rashes  NEURO: Normal strength and tone, sensory exam grossly normal, mentation intact and speech normal  PSYCH: mentation appears normal and affect normal/bright    ASSESSMENT / PLAN:   (Z00.00) Routine history and physical examination of adult  (primary encounter diagnosis)  Comment:   Plan:     (M54.5) Midline low back pain without sciatica, unspecified chronicity  Comment: Leg pain, likely not back related   Plan: improved after PT     (M79.604,  M79.605) Leg pain, bilateral  Comment: sounds like cramps on and off   Plan: Comprehensive metabolic panel, VASCULAR         REFERRAL, Magnesium        Discussed possible differential diagnosis for her symptoms. clinically her back pain has improved, but bc of her other risk factor, proceed with further evaluation. Cares and symptomatic treatment discussed follow up if problem       (Z12.11) Colon cancer screening  Comment:   Plan: Fecal colorectal cancer screen (FIT)            (E55.9) Vitamin D deficiency  Comment:   Plan: Vitamin D Deficiency        Check labs f/u as needed       End of Life Planning:  Patient currently has an advanced directive: Yes.  Practitioner is supportive of decision.    COUNSELING:  Reviewed preventive health counseling, as reflected in patient instructions       Regular exercise       Healthy diet/nutrition       Vision screening       Hearing screening       Dental care        Estimated body mass index is 26.1 kg/(m^2) as calculated from the following:    Height as of 10/10/17: 5' 0.75\" (1.543 m).    Weight as of 10/10/17: 137 lb (62.1 kg).     " reports that she quit smoking about 27 years ago. Her smoking use included Cigarettes. She has never used smokeless tobacco.        Appropriate preventive services were discussed with this patient, including applicable screening as appropriate for cardiovascular disease, diabetes, osteopenia/osteoporosis, and glaucoma.  As appropriate for age/gender, discussed screening for colorectal cancer, prostate cancer, breast cancer, and cervical cancer. Checklist reviewing preventive services available has been given to the patient.    Reviewed patients plan of care and provided an AVS. The Basic Care Plan (routine screening as documented in Health Maintenance) for Shelley meets the Care Plan requirement. This Care Plan has been established and reviewed with the Patient.    Counseling Resources:  ATP IV Guidelines  Pooled Cohorts Equation Calculator  Breast Cancer Risk Calculator  FRAX Risk Assessment  ICSI Preventive Guidelines  Dietary Guidelines for Americans, 2010  USDA's MyPlate  ASA Prophylaxis  Lung CA Screening    Fabiola Mahmood MD  [facility] [place]

## 2017-12-08 LAB
ALBUMIN SERPL-MCNC: 4 G/DL (ref 3.4–5)
ALP SERPL-CCNC: 75 U/L (ref 40–150)
ALT SERPL W P-5'-P-CCNC: 32 U/L (ref 0–50)
ANION GAP SERPL CALCULATED.3IONS-SCNC: 6 MMOL/L (ref 3–14)
AST SERPL W P-5'-P-CCNC: 31 U/L (ref 0–45)
BILIRUB SERPL-MCNC: 0.5 MG/DL (ref 0.2–1.3)
BUN SERPL-MCNC: 18 MG/DL (ref 7–30)
CALCIUM SERPL-MCNC: 9.2 MG/DL (ref 8.5–10.1)
CHLORIDE SERPL-SCNC: 105 MMOL/L (ref 94–109)
CO2 SERPL-SCNC: 28 MMOL/L (ref 20–32)
CREAT SERPL-MCNC: 0.8 MG/DL (ref 0.52–1.04)
DEPRECATED CALCIDIOL+CALCIFEROL SERPL-MC: 45 UG/L (ref 20–75)
GFR SERPL CREATININE-BSD FRML MDRD: 71 ML/MIN/1.7M2
GLUCOSE SERPL-MCNC: 87 MG/DL (ref 70–99)
MAGNESIUM SERPL-MCNC: 2.3 MG/DL (ref 1.6–2.3)
POTASSIUM SERPL-SCNC: 4.2 MMOL/L (ref 3.4–5.3)
PROT SERPL-MCNC: 7.4 G/DL (ref 6.8–8.8)
SODIUM SERPL-SCNC: 139 MMOL/L (ref 133–144)

## 2017-12-08 ASSESSMENT — ANXIETY QUESTIONNAIRES: GAD7 TOTAL SCORE: 0

## 2017-12-11 ENCOUNTER — OFFICE VISIT (OUTPATIENT)
Dept: OTHER | Facility: CLINIC | Age: 67
End: 2017-12-11
Attending: INTERNAL MEDICINE
Payer: MEDICARE

## 2017-12-11 VITALS
SYSTOLIC BLOOD PRESSURE: 131 MMHG | BODY MASS INDEX: 24.73 KG/M2 | WEIGHT: 134.4 LBS | HEIGHT: 62 IN | DIASTOLIC BLOOD PRESSURE: 80 MMHG | HEART RATE: 84 BPM | OXYGEN SATURATION: 97 %

## 2017-12-11 DIAGNOSIS — I73.9 PAD (PERIPHERAL ARTERY DISEASE) (H): ICD-10-CM

## 2017-12-11 DIAGNOSIS — M48.062 SPINAL STENOSIS OF LUMBAR REGION WITH NEUROGENIC CLAUDICATION: ICD-10-CM

## 2017-12-11 DIAGNOSIS — M54.50 CHRONIC MIDLINE LOW BACK PAIN WITHOUT SCIATICA: ICD-10-CM

## 2017-12-11 DIAGNOSIS — G89.29 CHRONIC MIDLINE LOW BACK PAIN WITHOUT SCIATICA: ICD-10-CM

## 2017-12-11 DIAGNOSIS — I25.5 ISCHEMIC CARDIOMYOPATHY: Primary | ICD-10-CM

## 2017-12-11 PROCEDURE — 99211 OFF/OP EST MAY X REQ PHY/QHP: CPT

## 2017-12-11 PROCEDURE — 99204 OFFICE O/P NEW MOD 45 MIN: CPT | Mod: ZP | Performed by: INTERNAL MEDICINE

## 2017-12-11 NOTE — MR AVS SNAPSHOT
After Visit Summary   12/11/2017    Shelley Mccrary    MRN: [id number]           Patient Information     Date Of Birth          1950        Visit Information        Provider Department      12/11/2017 1:00 PM Elian Selby MD [facility]      Today's Diagnoses     Ischemic cardiomyopathy    -  1    Spinal stenosis of lumbar region with neurogenic claudication        Chronic midline low back pain without sciatica        PAD (peripheral artery disease) (H)           Follow-ups after your visit        Your next 10 appointments already scheduled     Dec 13, 2017  7:45 PM CST   (Arrive by 7:30 PM)   MR LUMBAR SPINE W CONTRAST with MRP1   [facility] ([facility])    [address] [place] [address] [phone]           Take your medicines as usual, unless your doctor tells you not to. Bring a list of your current medicines to your exam (including vitamins, minerals and over-the-counter drugs).  You will be given intravenous contrast for this exam. To prepare:   The day before your exam, drink extra fluids at least six 8-ounce glasses (unless your doctor tells you to restrict your fluids).   Have a blood test (creatinine test) within 30 days of your exam. Go to your clinic or Diagnostic Imaging Department for this test.  The MRI machine uses a strong magnet. Please wear clothes without metal (snaps, zippers). A sweatsuit works well, or we may give you a hospital gown.  Please remove any body piercings and hair extensions before you arrive. You will also remove watches, jewelry, hairpins, wallets, dentures, partial dental plates and hearing aids. You may wear contact lenses, and you may be able to wear your rings. We have a safe place to keep your personal items, but it is safer to leave them at home.   **IMPORTANT** THE INSTRUCTIONS BELOW ARE ONLY FOR THOSE PATIENTS WHO HAVE BEEN  TOLD THEY WILL RECEIVE SEDATION OR GENERAL ANESTHESIA DURING THEIR MRI PROCEDURE:  IF YOU WILL RECEIVE SEDATION (take medicine to help you relax during your exam):   You must get the medicine from your doctor before you arrive. Bring the medicine to the exam. Do not take it at home.   Arrive one hour early. Bring someone who can take you home after the test. Your medicine will make you sleepy. After the exam, you may not drive, take a bus or take a taxi by yourself.   No eating 8 hours before your exam. You may have clear liquids up until 4 hours before your exam. (Clear liquids include water, clear tea, black coffee and fruit juice without pulp.)  IF YOU WILL RECEIVE ANESTHESIA (be asleep for your exam):   Arrive 1 1/2 hours early. Bring someone who can take you home after the test. You may not drive, take a bus or take a taxi by yourself.   No eating 8 hours before your exam. You may have clear liquids up until 4 hours before your exam. (Clear liquids include water, clear tea, black coffee and fruit juice without pulp.)  Please call the Imaging Department at your exam site with any questions.            Dec 14, 2017  2:15 PM CST   US VARSHA DOPPLER WITH EXERCISE BILATERAL with SHVUS2   [facility] ([facility])    [address]  [facility] [address]   [phone]           Please bring a list of your medicines (including vitamins, minerals and over-the-counter drugs). Also, tell your doctor about any allergies you may have. Wear comfortable clothes and leave your valuables at home.  No caffeine or tobacco for 1 hour prior to exam.  Please call the Imaging Department at your exam site with any questions.            Dec 18, 2017  1:00 PM CST   Return Visit with Elian Selby MD   [facility] ([facility])    [address]  [facility] [address] [phone]  "           Dec 18, 2017  2:50 PM CST   OLVIN Spine with Hanane Vijaya, PT   [facility] ([facility] [place])    [address] [place] [address]  [phone]  [place] [address] [phone]              Future tests that were ordered for you today     Open Future Orders        Priority Expected Expires Ordered    MR Lumbar Spine w Contrast Routine 12/12/2017 12/11/2018 12/11/2017    US VARSHA Doppler with Exercise Routine 12/13/2017 12/11/2018 12/11/2017            Who to contact     If you have questions or need follow up information about today's clinic visit or your schedule please contact [facility] directly at [phone].  Normal or non-critical lab and imaging results will be communicated to you by Be Sporthart, letter or phone within 4 business days after the clinic has received the results. If you do not hear from us within 7 days, please contact the clinic through Be Sporthart or phone. If you have a critical or abnormal lab result, we will notify you by phone as soon as possible.  Submit refill requests through Liquid X or call your pharmacy and they will forward the refill request to us. Please allow 3 business days for your refill to be completed.          Additional Information About Your Visit        Liquid X Information     Liquid X gives you secure access to your electronic health record. If you see a primary care provider, you can also send messages to your care team and make appointments. If you have questions, please call your primary care clinic.  If you do not have a primary care provider, please call [phone] and they will assist you.        Care EveryWhere ID     This is your Care EveryWhere ID. This could be used by other organizations to access your [facility] medical records  [id number]        Your Vitals Were     Pulse Height Pulse Oximetry BMI (Body Mass Index)          84 5' 2\" (1.575 m) 97% 24.58 kg/m2         Blood Pressure " from Last 3 Encounters:   12/11/17 131/80   12/07/17 124/77   10/10/17 118/77    Weight from Last 3 Encounters:   12/11/17 134 lb 6.4 oz (61 kg)   12/07/17 136 lb (61.7 kg)   10/10/17 137 lb (62.1 kg)               Primary Care Provider Office Phone # Fax #    Fabiola Paresh Mahmood [phone]       [address] [place] [place] [address]        Equal Access to Services     [facility]: Hadii aad ku hadasho Soomaali, waaxda luqadaha, qaybta kaalmada adeegyada, waxdotty clarke . So [facility] [phone].    ATENCIÓN: Si habla español, tiene a hylton disposición servicios gratuitos de asistencia lingüística. [facility] [phone].    We comply with applicable federal civil rights laws and [place] laws. We do not discriminate on the basis of race, color, national origin, age, disability, sex, sexual orientation, or gender identity.            Thank you!     Thank you for choosing [facility] [place]  for your care. Our goal is always to provide you with excellent care. Hearing back from our patients is one way we can continue to improve our services. Please take a few minutes to complete the written survey that you may receive in the mail after your visit with us. Thank you!             Your Updated Medication List - Protect others around you: Learn how to safely use, store and throw away your medicines at www.disposemymeds.org.          This list is accurate as of: 12/11/17  1:44 PM.  Always use your most recent med list.                   Brand Name Dispense Instructions for use Diagnosis    aspirin 81 MG tablet      Take by mouth daily        atorvastatin 40 MG tablet    LIPITOR    90 tablet    TAKE 1 TABLET BY MOUTH DAILY    Hyperlipidemia LDL goal <70       clobetasol 0.05 % cream    TEMOVATE          MELATONIN PO      Take 1 mg by mouth At Bedtime        metoprolol 25 MG 24 hr tablet    TOPROL-XL    90 tablet    Take 1 tablet (25 mg) by mouth daily     Cardiomyopathy, unspecified       MULTI-VITAMINS Tabs      Take 1 tablet by mouth        nitroGLYcerin 0.4 MG sublingual tablet    NITROSTAT    25 tablet    Place 1 tablet (0.4 mg) under the tongue every 5 minutes as needed for chest pain If you are still having symptoms after 3 doses (15 minutes) call 911.    CAD (coronary artery disease), S/P angioplasty with stent       OMEGA-3 FISH OIL PO      Take 2 g by mouth daily        sertraline 25 MG tablet    ZOLOFT    90 tablet    Take 1 tablet (25 mg) by mouth daily    Adjustment disorder with mixed anxiety and depressed mood

## 2017-12-11 NOTE — PROGRESS NOTES
Vascular Medicine Consultation     Chief Complaint   Leg pain    Date of Admission:  (Not on file)    Shelley Mccrary is a 67 year old female who was admitted on (Not on file). I was asked to see the patient for bilateral leg pain.    Code Status    Local    Reason for Consult   Reason for consult: I was asked by PCP to evaluate this patient for bilateral leg pain.    Primary Care Physician   Fabiola Mahmood      History is obtained from the patient    History of Present Illness   Shelley Mccrary is a 67 year old female who presents with bilateral leg pain, patient mentioned that the condition started last year December 26 when she went to sit in the core while the core was parked on ramp then she felt pain in her back which radiated to her leg from that time patient has been getting pain on and off bilateral lower extremities in the posterior part of the legs sometimes related to walking and sometimes is not patient would have the pain if she moves around then the pain is much better if she sits down or she extends her back  Patient is still jogging, swimming, and she would have no pain  Patient denied any history of rest pain, night pain, skin color changes or skin temperature changes  Patient denied any history of skin ulcers  Patient denied any history of chest pain, palpitations, shortness of breath  Patient denies any leg heaviness or leg cramps    Past Medical History   I have reviewed this patient's medical history and updated it with pertinent information if needed.   Past Medical History:   Diagnosis Date     Anxiety     post MI     CAD (coronary artery disease) 7/29/2015     Depression     post MI     MRSA infection     hand      Myocardial infarction 7/2015     Vertigo     related miners        Past Surgical History   I have reviewed this patient's surgical history and updated it with pertinent information if needed.  Past Surgical History:   Procedure Laterality Date     ANGIOPLASTY   7/17/2015    MAURISIO to mid circ, thrombectomy -MAURISIO to proximal, mid, distal RCA , successful balloon to 1st RPL done Resolute stents used procedure done at [facility]     INNER EAR SURGERY      redesign merritt faye of sid guevara        Prior to Admission Medications   Cannot display prior to admission medications because the patient has not been admitted in this contact.     Allergies   Allergies   Allergen Reactions     Hmg-Coa-R Inhibitors Other (See Comments)     elevated liver enzymes       Social History   I have reviewed this patient's social history and updated it with pertinent information if needed. Shelley AGUILAR Antoniosteve  reports that she quit smoking about 27 years ago. Her smoking use included Cigarettes. She has never used smokeless tobacco. She reports that she does not drink alcohol or use illicit drugs.    Family History   I have reviewed this patient's family history and updated it with pertinent information if needed.   Family History   Problem Relation Age of Onset     Coronary Artery Disease Father      at age 39, and other heart problem      Hyperlipidemia Father      ?      Hypertension Mother      DIABETES No family hx of      CEREBROVASCULAR DISEASE No family hx of      Breast Cancer No family hx of      Colon Cancer No family hx of        Review of Systems   The 10 point Review of Systems is negative other than noted in the HPI or here.     Physical Exam       BP: 131/80 Pulse: 84     SpO2: 97 %      Vital Signs with Ranges  Pulse:  [84] 84  BP: (131)/(80) 131/80  SpO2:  [97 %] 97 %  134 lbs 6.4 oz    Constitutional: awake, alert, cooperative, no apparent distress, and appears stated age  Eyes: Lids and lashes normal, pupils equal, round and reactive to light, extra ocular muscles intact, sclera clear, conjunctiva normal  ENT: normocepalic, without obvious abnormality, oropharynx pink and moist  Hematologic / Lymphatic: no lymphadenopathy  Respiratory: No increased work of breathing,  good air exchange, clear to auscultation bilaterally, no crackles or wheezing  Cardiovascular: regular rate and rhythm, normal S1 and S2 and no murmur noted  GI: Normal bowel sounds, soft, non-distended, non-tender  Skin: no redness, warmth, or swelling, no rashes and no lesions  Musculoskeletal: There is no redness, warmth, or swelling of the joints.  Full range of motion noted.  Motor strength is 5 out of 5 all extremities bilaterally.  Tone is normal.  Neurologic: Awake, alert, oriented to name, place and time.  Cranial nerves II-XII are grossly intact.  Motor is 5 out of 5 bilaterally.    Neuropsychiatric:  Normal affect, memory, insight.  Pulses: Palpable DP and PT pulses bilaterally +2 and equal palpable popliteal pulses +1 and equal  . No carotid bruits appreciated.     Data   Most Recent 3 CBC's:  Recent Labs   Lab Test  03/31/16   1416  08/06/15   0520  08/05/15   2320   WBC  6.9  6.6  9.0   HGB  13.9  13.3  13.9   MCV  92  87  87   PLT  243  272  329     Most Recent 3 BMP's:  Recent Labs   Lab Test  12/07/17   1049  10/10/17   1127  11/14/16   0844  10/28/16   1205   NA  139  140   --   142   POTASSIUM  4.2  4.2   --   3.8   CHLORIDE  105  105   --   109   CO2  28  27   --   24   BUN  18  17   --   17   CR  0.80  0.83   --   0.72   ANIONGAP  6  8   --   9   STACY  9.2  9.0   --   8.8   GLC  87  84  92  125*     Most Recent 2 LFT's:  Recent Labs   Lab Test  12/07/17   1049  10/10/17   1127   AST  31  28   ALT  32  32   ALKPHOS  75  70   BILITOTAL  0.5  0.5     Most Recent 3 Creatinines:  Recent Labs   Lab Test  12/07/17   1049  10/10/17   1127  10/28/16   1205   CR  0.80  0.83  0.72     Most Recent 3 Hemoglobins:  Recent Labs   Lab Test  03/31/16   1416  08/06/15   0520  08/05/15   2320   HGB  13.9  13.3  13.9     Most Recent 3 BNP's:No lab results found.  Most Recent D-dimer:No lab results found.  Most Recent Cholesterol Panel:  Recent Labs   Lab Test  10/10/17   1127   CHOL  126   LDL  56   HDL  61   TRIG   45     Most Recent Hemoglobin A1c:No lab results found.  Most Recent Urinalysis:  Recent Labs   Lab Test  08/06/15   1055   COLOR  Light Yellow   APPEARANCE  Clear   URINEGLC  Negative   URINEBILI  Negative   URINEKETONE  Negative   SG  1.008   UBLD  Negative   URINEPH  5.5   PROTEIN  Negative   NITRITE  Negative   LEUKEST  Negative     Most Recent ESR & CRP:No lab results found.      Assessment & Plan   (I25.5) Ischemic cardiomyopathy  (primary encounter diagnosis)  Comment: Stable continue with the same current meds      (M48.062) Spinal stenosis of lumbar region with neurogenic claudication  Comment: MRI lumbosacral spine to rule out any radiculopathy or causes of radiculopathy, disc herniation, spinal stenosis  Plan: MR Lumbar Spine w Contrast, US VARSHA Doppler with        Exercise            (M54.5,  G89.29) Chronic midline low back pain without sciatica  Comment: Same as above      (I73.9) PAD (peripheral artery disease) (H)    Plan: US VARSHA Doppler with Exercise              Summary: Pain looks like it's secondary to radiculopathy rather than PAD patient's exam and complain doesn't fit with PAD  Will await test results and will discuss results whenever they are available with the patient    Elian Selby MD

## 2017-12-11 NOTE — NURSING NOTE
"Chief Complaint   Patient presents with     Consult     referred by  for bilateral lower extremity pain, worse with walking. Records in EPIC       Initial /80 (BP Location: Left arm, Patient Position: Chair, Cuff Size: Adult Regular)  Pulse 84  Ht 5' 2\" (1.575 m)  Wt 134 lb 6.4 oz (61 kg)  SpO2 97%  BMI 24.58 kg/m2 Estimated body mass index is 24.58 kg/(m^2) as calculated from the following:    Height as of this encounter: 5' 2\" (1.575 m).    Weight as of this encounter: 134 lb 6.4 oz (61 kg).  Medication Reconciliation: complete    Face to Face time 8 minutes  Cindy Barnes CMA      "

## 2017-12-13 ENCOUNTER — HOSPITAL ENCOUNTER (OUTPATIENT)
Dept: MRI IMAGING | Facility: CLINIC | Age: 67
Discharge: HOME | End: 2017-12-13
Attending: INTERNAL MEDICINE | Admitting: INTERNAL MEDICINE
Payer: MEDICARE

## 2017-12-13 DIAGNOSIS — M48.062 SPINAL STENOSIS OF LUMBAR REGION WITH NEUROGENIC CLAUDICATION: ICD-10-CM

## 2017-12-13 PROCEDURE — 72148 MRI LUMBAR SPINE W/O DYE: CPT

## 2017-12-14 ENCOUNTER — HOSPITAL ENCOUNTER (OUTPATIENT)
Dept: ULTRASOUND IMAGING | Facility: CLINIC | Age: 67
Discharge: HOME | End: 2017-12-14
Attending: INTERNAL MEDICINE | Admitting: INTERNAL MEDICINE
Payer: MEDICARE

## 2017-12-14 DIAGNOSIS — I73.9 PAD (PERIPHERAL ARTERY DISEASE) (H): ICD-10-CM

## 2017-12-14 DIAGNOSIS — M48.062 SPINAL STENOSIS OF LUMBAR REGION WITH NEUROGENIC CLAUDICATION: ICD-10-CM

## 2017-12-14 PROCEDURE — 93924 LWR XTR VASC STDY BILAT: CPT

## 2017-12-15 PROCEDURE — 82274 ASSAY TEST FOR BLOOD FECAL: CPT | Performed by: FAMILY MEDICINE

## 2017-12-17 LAB — HEMOCCULT STL QL IA: NEGATIVE

## 2017-12-18 ENCOUNTER — OFFICE VISIT (OUTPATIENT)
Dept: OTHER | Facility: CLINIC | Age: 67
End: 2017-12-18
Attending: INTERNAL MEDICINE
Payer: MEDICARE

## 2017-12-18 VITALS
OXYGEN SATURATION: 98 % | HEART RATE: 74 BPM | WEIGHT: 134.4 LBS | DIASTOLIC BLOOD PRESSURE: 76 MMHG | BODY MASS INDEX: 24.73 KG/M2 | SYSTOLIC BLOOD PRESSURE: 127 MMHG | HEIGHT: 62 IN

## 2017-12-18 DIAGNOSIS — Z12.11 COLON CANCER SCREENING: ICD-10-CM

## 2017-12-18 DIAGNOSIS — M48.062 SPINAL STENOSIS, LUMBAR REGION, WITH NEUROGENIC CLAUDICATION: Primary | ICD-10-CM

## 2017-12-18 PROCEDURE — 99211 OFF/OP EST MAY X REQ PHY/QHP: CPT

## 2017-12-18 PROCEDURE — 99214 OFFICE O/P EST MOD 30 MIN: CPT | Mod: ZP | Performed by: INTERNAL MEDICINE

## 2017-12-18 NOTE — PROGRESS NOTES
Vascular Medicine Progress Note     Shelley Mccrary is a 67 year old female who was admitted on (Not on file). Mild lumbosacral spine results and VARSHA with exercise test results    Interval History   Patient is doing well pain in both lower extremities is on and off, patient is able to exercise, swim, jog, her VARSHA showed no evidence of PAD may be evidence of hardening of the arteries but otherwise she is doing well her MRI lumbosacral spine test results are mentioned below    Physical Exam       BP: 127/76 Pulse: 74     SpO2: 98 %      Vitals:    12/18/17 1250   Weight: 134 lb 6.4 oz (61 kg)     Vital Signs with Ranges  Pulse:  [74] 74  BP: (127)/(76) 127/76  SpO2:  [98 %] 98 %  [unfilled]    Constitutional: awake, alert, cooperative, no apparent distress, and appears stated age  Eyes: Lids and lashes normal, pupils equal, round and reactive to light, extra ocular muscles intact, sclera clear, conjunctiva normal  ENT: normocepalic, without obvious abnormality, oropharynx pink and moist  Hematologic / Lymphatic: no lymphadenopathy  Respiratory: No increased work of breathing, good air exchange, clear to auscultation bilaterally, no crackles or wheezing  Cardiovascular: regular rate and rhythm, normal S1 and S2 and no murmur noted  GI: Normal bowel sounds, soft, non-distended, non-tender  Skin: no redness, warmth, or swelling, no rashes and no lesions  Musculoskeletal: There is no redness, warmth, or swelling of the joints.  Full range of motion noted.  Motor strength is 5 out of 5 all extremities bilaterally.  Tone is normal.  Neurologic: Awake, alert, oriented to name, place and time.  Cranial nerves II-XII are grossly intact.  Motor is 5 out of 5 bilaterally.    Neuropsychiatric:  Normal affect, memory, insight.  Pulses: Palpable pulses DP and PT bilaterally and equal  . No carotid bruits appreciated.     Medications         Data     1. Advanced degenerative disc disease throughout the lumbar spine  with  discogenic endplate reactive marrow edema as noted above.  2. Multilevel apophyseal joint degenerative arthrosis in the mid and  lower lumbar spine with periarticular reactive marrow edema noted on  the right at L3-L4.  3. L2-L3 suspected complex synovial cysts along the anterior and  posterior margins of the left apophyseal joint. Anteriorly, this  protrudes 1 cm into the left neural foramen resulting in severe left  L2 foraminal stenosis.  4. Multilevel central stenosis, most severe at L3-L4,  moderate-to-severe at L2-L3, and mild-to-moderate at L4-L5.  5. Additional multilevel foraminal stenosis which is  moderate-to-severe bilaterally at L4-L5 and L5-S1.  Assessment & Plan   (M48.062) Spinal stenosis, lumbar region, with neurogenic claudication  (primary encounter diagnosis)  Comment: Refer to overfill surgery for opinion  Plan: ORTHOPEDICS ADULT REFERRAL                Summary: No evidence of PAD, leg pain secondary to spinal stenosis as shown above    Elian Selby MD

## 2017-12-18 NOTE — MR AVS SNAPSHOT
After Visit Summary   12/18/2017    Shelley Mccrary    MRN: [id number]           Patient Information     Date Of Birth          1950        Visit Information        Provider Department      12/18/2017 1:00 PM Elian Selby MD [facility] at  Vascular Center      Today's Diagnoses     Spinal stenosis, lumbar region, with neurogenic claudication    -  1       Follow-ups after your visit        Additional Services     ORTHOPEDICS ADULT REFERRAL       Your provider has referred you to: [facility] [place] [phone]   [email or web address]    Please be aware that coverage of these services is subject to the terms and limitations of your health insurance plan.  Call member services at your health plan with any benefit or coverage questions.      Please bring the following to your appointment:    >>   Any x-rays, CTs or MRIs which have been performed.  Contact the facility where they were done to arrange for  prior to your scheduled appointment.    >>   List of current medications   >>   This referral request   >>   Any documents/labs given to you for this referral                  Who to contact     If you have questions or need follow up information about today's clinic visit or your schedule please contact [facility] [place] directly at [phone].  Normal or non-critical lab and imaging results will be communicated to you by MyChart, letter or phone within 4 business days after the clinic has received the results. If you do not hear from us within 7 days, please contact the clinic through MyChart or phone. If you have a critical or abnormal lab result, we will notify you by phone as soon as possible.  Submit refill requests through [employer] or call your pharmacy and they will forward the refill request to us. Please allow 3 business days for your refill to be completed.           "Additional Information About Your Visit        MyChart Information     nScaled gives you secure access to your electronic health record. If you see a primary care provider, you can also send messages to your care team and make appointments. If you have questions, please call your primary care clinic.  If you do not have a primary care provider, please call [phone] and they will assist you.        Care EveryWhere ID     This is your Care EveryWhere ID. This could be used by other organizations to access your [facility] medical records  [id number]        Your Vitals Were     Pulse Height Pulse Oximetry BMI (Body Mass Index)          74 5' 2\" (1.575 m) 98% 24.58 kg/m2         Blood Pressure from Last 3 Encounters:   12/18/17 127/76   12/11/17 131/80   12/07/17 124/77    Weight from Last 3 Encounters:   12/18/17 134 lb 6.4 oz (61 kg)   12/11/17 134 lb 6.4 oz (61 kg)   12/07/17 136 lb (61.7 kg)              We Performed the Following     ORTHOPEDICS ADULT REFERRAL        Primary Care Provider Office Phone # Fax #    Fabiola Paresh Mahmood [phone]       [address] [place] [facility] [place] [place] [address]        Equal Access to Services     [facility]: Hadii aad ku hadasho Soomaali, waaxda luqadaha, qaybta kaalmada adeegyada, waxay idiin hayaan adeeg kharasaundra la'aan ah. So [facility] [phone].    ATENCIÓN: Si habla español, tiene a hylton disposición servicios gratuitos de asistencia lingüística. Llboo al [phone].    We comply with applicable federal civil rights laws and [place] laws. We do not discriminate on the basis of race, color, national origin, age, disability, sex, sexual orientation, or gender identity.            Thank you!     Thank you for choosing [facility] [place]  for your care. Our goal is always to provide you with excellent care. Hearing back from our patients is one way we can continue to improve our services. Please take a few minutes to complete the written survey " that you may receive in the mail after your visit with us. Thank you!             Your Updated Medication List - Protect others around you: Learn how to safely use, store and throw away your medicines at www.disposemymeds.org.          This list is accurate as of: 12/18/17  1:36 PM.  Always use your most recent med list.                   Brand Name Dispense Instructions for use Diagnosis    aspirin 81 MG tablet      Take by mouth daily        atorvastatin 40 MG tablet    LIPITOR    90 tablet    TAKE 1 TABLET BY MOUTH DAILY    Hyperlipidemia LDL goal <70       clobetasol 0.05 % cream    TEMOVATE          MELATONIN PO      Take 1 mg by mouth At Bedtime        metoprolol 25 MG 24 hr tablet    TOPROL-XL    90 tablet    Take 1 tablet (25 mg) by mouth daily    Cardiomyopathy, unspecified       MULTI-VITAMINS Tabs      Take 1 tablet by mouth        nitroGLYcerin 0.4 MG sublingual tablet    NITROSTAT    25 tablet    Place 1 tablet (0.4 mg) under the tongue every 5 minutes as needed for chest pain If you are still having symptoms after 3 doses (15 minutes) call 911.    CAD (coronary artery disease), S/P angioplasty with stent       OMEGA-3 FISH OIL PO      Take 2 g by mouth daily        sertraline 25 MG tablet    ZOLOFT    90 tablet    Take 1 tablet (25 mg) by mouth daily    Adjustment disorder with mixed anxiety and depressed mood

## 2017-12-18 NOTE — NURSING NOTE
"Chief Complaint   Patient presents with     RECHECK     F/U appointment from MRI and VARSHA US.       Initial /76 (BP Location: Left arm, Patient Position: Chair, Cuff Size: Adult Regular)  Pulse 74  Ht 5' 2\" (1.575 m)  Wt 134 lb 6.4 oz (61 kg)  SpO2 98%  BMI 24.58 kg/m2 Estimated body mass index is 24.58 kg/(m^2) as calculated from the following:    Height as of this encounter: 5' 2\" (1.575 m).    Weight as of this encounter: 134 lb 6.4 oz (61 kg).  Medication Reconciliation: complete    Face to Face time 8 minutes  Cindy Barnes CMA      "

## 2018-02-03 DIAGNOSIS — F43.23 ADJUSTMENT DISORDER WITH MIXED ANXIETY AND DEPRESSED MOOD: ICD-10-CM

## 2018-02-05 RX ORDER — SERTRALINE HYDROCHLORIDE 25 MG/1
TABLET, FILM COATED ORAL
Qty: 90 TABLET | Refills: 1 | Status: SHIPPED | OUTPATIENT
Start: 2018-02-05 | End: 2018-07-25

## 2018-02-05 NOTE — TELEPHONE ENCOUNTER
Prescription approved per [facility], [facility] or [facility] refill protocol.  Christine Serna RN - Triage  [facility]

## 2018-02-12 PROBLEM — M54.50 MIDLINE LOW BACK PAIN WITHOUT SCIATICA: Status: RESOLVED | Noted: 2017-10-16 | Resolved: 2018-02-12

## 2018-07-25 DIAGNOSIS — F43.23 ADJUSTMENT DISORDER WITH MIXED ANXIETY AND DEPRESSED MOOD: ICD-10-CM

## 2018-07-26 RX ORDER — SERTRALINE HYDROCHLORIDE 25 MG/1
TABLET, FILM COATED ORAL
Qty: 90 TABLET | Refills: 1 | Status: SHIPPED | OUTPATIENT
Start: 2018-07-26 | End: 2018-12-11

## 2018-07-26 NOTE — TELEPHONE ENCOUNTER
"Requested Prescriptions   Pending Prescriptions Disp Refills     sertraline (ZOLOFT) 25 MG tablet [Pharmacy Med Name: SERTRALINE 25MG TABLETS] 90 tablet 0     Sig: TAKE 1 TABLET(25 MG) BY MOUTH DAILY    SSRIs Protocol Passed    7/25/2018  9:18 PM       Passed - Recent (12 mo) or future (30 days) visit within the authorizing provider's specialty    Patient had office visit in the last 12 months or has a visit in the next 30 days with authorizing provider or within the authorizing provider's specialty.  See \"Patient Info\" tab in inbasket, or \"Choose Columns\" in Meds & Orders section of the refill encounter.           Passed - Patient is age 18 or older       Passed - No active pregnancy on record       Passed - No positive pregnancy test in last 12 months          "

## 2018-07-26 NOTE — TELEPHONE ENCOUNTER
Last Written Prescription Date:  2/5/2018  Last Fill Quantity: 90,  # refills: 1   Last office visit: 12/7/2017 with prescribing provider:  Timoteo   Future Office Visit:   Next 5 appointments (look out 90 days)     Sep 12, 2018  3:15 PM CDT   Return Visit with Salty Lovett MD   [facility] ([address] [id number] [phone]                 PHQ-9 SCORE 11/14/2016 4/27/2017 12/7/2017   Total Score - - -   Total Score MyChart - 0 -   Total Score 0 - 0     Needs updated PHQ-9.  My chart sent to patient.  Christine Serna RN - Triage  [facility]

## 2018-07-26 NOTE — TELEPHONE ENCOUNTER
Patient updated PHQ-9  PHQ-9 SCORE 4/27/2017 12/7/2017 7/26/2018   Total Score - - -   Total Score MyChart 0 - 0   Total Score - 0 0     Prescription approved per [facility], [facility] or [facility] refill protocol.  Christine Serna RN - Triage  [facility] [place] [facility]

## 2018-09-12 ENCOUNTER — OFFICE VISIT (OUTPATIENT)
Dept: CARDIOLOGY | Facility: CLINIC | Age: 68
End: 2018-09-12
Payer: MEDICARE

## 2018-09-12 VITALS
DIASTOLIC BLOOD PRESSURE: 82 MMHG | OXYGEN SATURATION: 97 % | WEIGHT: 137.6 LBS | HEART RATE: 83 BPM | BODY MASS INDEX: 25.32 KG/M2 | HEIGHT: 62 IN | SYSTOLIC BLOOD PRESSURE: 125 MMHG

## 2018-09-12 DIAGNOSIS — I25.5 ISCHEMIC CARDIOMYOPATHY: ICD-10-CM

## 2018-09-12 DIAGNOSIS — I25.10 CORONARY ARTERY DISEASE INVOLVING NATIVE CORONARY ARTERY OF NATIVE HEART WITHOUT ANGINA PECTORIS: Primary | ICD-10-CM

## 2018-09-12 DIAGNOSIS — E78.5 HYPERLIPIDEMIA LDL GOAL <70: ICD-10-CM

## 2018-09-12 PROCEDURE — 99213 OFFICE O/P EST LOW 20 MIN: CPT | Performed by: INTERNAL MEDICINE

## 2018-09-12 NOTE — LETTER
9/12/2018    Fabiola Mahmood MD  [address] [place] [address] Dr  [place] [address]    RE: Shelley Mccrary       Dear Colleague,    I had the pleasure of seeing Shelley Mccrary in the [facility].    HPI and Plan:    had the pleasure of seeing Shelley Mccrary in Cardiology Clinic in followup.  She is a pleasant 66-year-old female with past medical history of inferolateral myocardial infarction in July 2015 when she was admitted to [facility] and had RCA intervention.  At that time, posterolateral branch with angioplasty was also performed.  She is doing quite well.  She exercises daily.  She also lifts weights.  She is also following a low cholesterol, low-salt diet religiously.  I am quite impressed by her lifestyle modification.          She is on metoprolol XL at 25 daily.   Previously, her blood pressures are low and therefore, we had to stop her lisinopril.  She remains on aspirin.       Last lipid profile in Oct 2017 showed an LDL of 56, HDL 61.   She is on atorvastatin.      She denies any chest pain or shortness of breath. She is quite active. She walks daily with her dog and occasionally has to run to keep up with her friends dog.     PHYSICAL EXAMINATION:   See below      IMPRESSION AND PLAN:   1.  Coronary artery disease, status post inferior wall myocardial infarction in 07/2015.  She is now free of chest pain and shortness of breath. Her echo shows improvement in ejection fraction of 55-60%. She has no evidence of congestive heart failure.   She remains on aspirin and metoprolol     2. Hyperlipidemia  Improved on atorvastatin 40 mg daily and with lifestyle modification.  Last HDL 61.    Overall, she is doing well.  I recommended an exercise stress nuclear study on medications next year to assess ischemic burden on the myocardium prior to visit with me.      Sincerely,     LAINEY QUISPE MD        cc:   Fabiola Mahmood MD   [address] [place]  [address]               Orders Placed This Encounter   Procedures     NM Exercise stress test (nuc card)     Follow-Up with Cardiologist       No orders of the defined types were placed in this encounter.      There are no discontinued medications.      Encounter Diagnoses   Name Primary?     Coronary artery disease involving native coronary artery of native heart without angina pectoris Yes     Hyperlipidemia LDL goal <70      Ischemic cardiomyopathy        CURRENT MEDICATIONS:  Current Outpatient Prescriptions   Medication Sig Dispense Refill     aspirin 81 MG tablet Take by mouth daily       atorvastatin (LIPITOR) 40 MG tablet TAKE 1 TABLET BY MOUTH DAILY 90 tablet 3     metoprolol (TOPROL-XL) 25 MG 24 hr tablet Take 1 tablet (25 mg) by mouth daily 90 tablet 3     Multiple Vitamin (MULTI-VITAMINS) TABS Take 1 tablet by mouth       nitroglycerin (NITROSTAT) 0.4 MG SL tablet Place 1 tablet (0.4 mg) under the tongue every 5 minutes as needed for chest pain If you are still having symptoms after 3 doses (15 minutes) call 911. 25 tablet 3     Omega-3 Fatty Acids (OMEGA-3 FISH OIL PO) Take 2 g by mouth daily       sertraline (ZOLOFT) 25 MG tablet TAKE 1 TABLET(25 MG) BY MOUTH DAILY 90 tablet 1     clobetasol (TEMOVATE) 0.05 % cream   2     MELATONIN PO Take 1 mg by mouth At Bedtime         ALLERGIES     Allergies   Allergen Reactions     Hmg-Coa-R Inhibitors Other (See Comments)     elevated liver enzymes       PAST MEDICAL HISTORY:  Past Medical History:   Diagnosis Date     Anxiety     post MI     CAD (coronary artery disease) 7/29/2015     Depression     post MI     MRSA infection     hand      Myocardial infarction 7/2015     Vertigo     related miners        PAST SURGICAL HISTORY:  Past Surgical History:   Procedure Laterality Date     ANGIOPLASTY  7/17/2015    MAURISIO to mid circ, thrombectomy -MAURISIO to proximal, mid, distal RCA , successful balloon to 1st RPL done Resolute stents used  "procedure done at [facility]     INNER EAR SURGERY      redesign merritt faye of sid guevara        FAMILY HISTORY:  Family History   Problem Relation Age of Onset     Coronary Artery Disease Father      at age 39, and other heart problem      Hyperlipidemia Father      ?      Hypertension Mother      Diabetes No family hx of      Cerebrovascular Disease No family hx of      Breast Cancer No family hx of      Colon Cancer No family hx of        SOCIAL HISTORY:  Social History     Social History     Marital status:      Spouse name: N/A     Number of children: N/A     Years of education: N/A     Social History Main Topics     Smoking status: Former Smoker     Types: Cigarettes     Quit date: 1/1/1990     Smokeless tobacco: Never Used      Comment: smoked 1-2 pack week, 30 years     Alcohol use No     Drug use: No     Sexual activity: No     Other Topics Concern     Parent/Sibling W/ Cabg, Mi Or Angioplasty Before 65f 55m? Yes     Caffeine Concern No     Sleep Concern No     Weight Concern No     Special Diet Yes     low fats     Exercise Yes     walking  miles day, swimming, ellyptical     Seat Belt Yes     Social History Narrative    , 2 kids, non smoker quit 22 yrs ago. No alcohol social occasional , works for a company to help elderly patient        Review of Systems:  Skin:  Negative       Eyes:  Negative      ENT:  Positive for hearing loss    Respiratory:  Negative       Cardiovascular:  Negative;exercise intolerance;palpitations;chest pain;edema;syncope or near-syncope;dizziness;lightheadedness      Gastroenterology: Negative      Genitourinary:  Negative      Musculoskeletal:  Negative      Neurologic:  Positive for numbness or tingling of feet    Psychiatric:  Negative      Heme/Lymph/Imm:  Negative      Endocrine:  Negative        Physical Exam:  Vitals: /82 (BP Location: Left arm, Patient Position: Chair, Cuff Size: Adult Regular)  Pulse 83  Ht 1.575 m (5' 2\")  Wt 62.4 kg " (137 lb 9.6 oz)  SpO2 97%  BMI 25.17 kg/m2    Constitutional:  in no acute distress        Skin:  warm and dry to the touch          Head:  no masses or lesions        Eyes:  pupils equal and round        Lymph:      ENT:           Neck:  carotid pulses are full and equal bilaterally        Respiratory:  clear to auscultation         Cardiac: normal S1 and S2     no presence of murmur          not assessed this visit                                        GI:  not assessed this visit        Extremities and Muscular Skeletal:  no edema              Neurological:  no gross motor deficits        Psych:           CC  No referring provider defined for this encounter.                Thank you for allowing me to participate in the care of your patient.      Sincerely,     Salty Lovett MD     [facility]    cc:   No referring provider defined for this encounter.

## 2018-09-12 NOTE — PROGRESS NOTES
HPI and Plan:    had the pleasure of seeing Shelley Mccrary in Cardiology Clinic in followup.  She is a pleasant 66-year-old female with past medical history of inferolateral myocardial infarction in July 2015 when she was admitted to [facility] and had RCA intervention.  At that time, posterolateral branch with angioplasty was also performed.  She is doing quite well.  She exercises daily.  She also lifts weights.  She is also following a low cholesterol, low-salt diet religiously.  I am quite impressed by her lifestyle modification.          She is on metoprolol XL at 25 daily.   Previously, her blood pressures are low and therefore, we had to stop her lisinopril.  She remains on aspirin.       Last lipid profile in Oct 2017 showed an LDL of 56, HDL 61.   She is on atorvastatin.      She denies any chest pain or shortness of breath. She is quite active. She walks daily with her dog and occasionally has to run to keep up with her friends dog.     PHYSICAL EXAMINATION:   See below      IMPRESSION AND PLAN:   1.  Coronary artery disease, status post inferior wall myocardial infarction in 07/2015.  She is now free of chest pain and shortness of breath. Her echo shows improvement in ejection fraction of 55-60%. She has no evidence of congestive heart failure.   She remains on aspirin and metoprolol     2. Hyperlipidemia  Improved on atorvastatin 40 mg daily and with lifestyle modification.  Last HDL 61.    Overall, she is doing well.  I recommended an exercise stress nuclear study on medications next year to assess ischemic burden on the myocardium prior to visit with me.      Sincerely,     LAINEY QUISPE MD        cc:   Fabiola Mahmood MD   [address]  [address]               Orders Placed This Encounter   Procedures     NM Exercise stress test (nuc card)     Follow-Up with Cardiologist       No orders of the defined types were placed in this  encounter.      There are no discontinued medications.      Encounter Diagnoses   Name Primary?     Coronary artery disease involving native coronary artery of native heart without angina pectoris Yes     Hyperlipidemia LDL goal <70      Ischemic cardiomyopathy        CURRENT MEDICATIONS:  Current Outpatient Prescriptions   Medication Sig Dispense Refill     aspirin 81 MG tablet Take by mouth daily       atorvastatin (LIPITOR) 40 MG tablet TAKE 1 TABLET BY MOUTH DAILY 90 tablet 3     metoprolol (TOPROL-XL) 25 MG 24 hr tablet Take 1 tablet (25 mg) by mouth daily 90 tablet 3     Multiple Vitamin (MULTI-VITAMINS) TABS Take 1 tablet by mouth       nitroglycerin (NITROSTAT) 0.4 MG SL tablet Place 1 tablet (0.4 mg) under the tongue every 5 minutes as needed for chest pain If you are still having symptoms after 3 doses (15 minutes) call 911. 25 tablet 3     Omega-3 Fatty Acids (OMEGA-3 FISH OIL PO) Take 2 g by mouth daily       sertraline (ZOLOFT) 25 MG tablet TAKE 1 TABLET(25 MG) BY MOUTH DAILY 90 tablet 1     clobetasol (TEMOVATE) 0.05 % cream   2     MELATONIN PO Take 1 mg by mouth At Bedtime         ALLERGIES     Allergies   Allergen Reactions     Hmg-Coa-R Inhibitors Other (See Comments)     elevated liver enzymes       PAST MEDICAL HISTORY:  Past Medical History:   Diagnosis Date     Anxiety     post MI     CAD (coronary artery disease) 7/29/2015     Depression     post MI     MRSA infection     hand      Myocardial infarction 7/2015     Vertigo     related miners        PAST SURGICAL HISTORY:  Past Surgical History:   Procedure Laterality Date     ANGIOPLASTY  7/17/2015    MAURISIO to mid circ, thrombectomy -MAURISIO to proximal, mid, distal RCA , successful balloon to 1st RPL done Resolute stents used procedure done at [facility]     INNER EAR SURGERY      redesign merritt faye of miners diseas        FAMILY HISTORY:  Family History   Problem Relation Age of Onset     Coronary Artery Disease Father      at age 39, and  "other heart problem      Hyperlipidemia Father      ?      Hypertension Mother      Diabetes No family hx of      Cerebrovascular Disease No family hx of      Breast Cancer No family hx of      Colon Cancer No family hx of        SOCIAL HISTORY:  Social History     Social History     Marital status:      Spouse name: N/A     Number of children: N/A     Years of education: N/A     Social History Main Topics     Smoking status: Former Smoker     Types: Cigarettes     Quit date: 1/1/1990     Smokeless tobacco: Never Used      Comment: smoked 1-2 pack week, 30 years     Alcohol use No     Drug use: No     Sexual activity: No     Other Topics Concern     Parent/Sibling W/ Cabg, Mi Or Angioplasty Before 65f 55m? Yes     Caffeine Concern No     Sleep Concern No     Weight Concern No     Special Diet Yes     low fats     Exercise Yes     walking  miles day, swimming, ellyptical     Seat Belt Yes     Social History Narrative    , 2 kids, non smoker quit 22 yrs ago. No alcohol social occasional , works for a company to help elderly patient        Review of Systems:  Skin:  Negative       Eyes:  Negative      ENT:  Positive for hearing loss    Respiratory:  Negative       Cardiovascular:  Negative;exercise intolerance;palpitations;chest pain;edema;syncope or near-syncope;dizziness;lightheadedness      Gastroenterology: Negative      Genitourinary:  Negative      Musculoskeletal:  Negative      Neurologic:  Positive for numbness or tingling of feet    Psychiatric:  Negative      Heme/Lymph/Imm:  Negative      Endocrine:  Negative        Physical Exam:  Vitals: /82 (BP Location: Left arm, Patient Position: Chair, Cuff Size: Adult Regular)  Pulse 83  Ht 1.575 m (5' 2\")  Wt 62.4 kg (137 lb 9.6 oz)  SpO2 97%  BMI 25.17 kg/m2    Constitutional:  in no acute distress        Skin:  warm and dry to the touch          Head:  no masses or lesions        Eyes:  pupils equal and round        Lymph:      ENT:       "     Neck:  carotid pulses are full and equal bilaterally        Respiratory:  clear to auscultation         Cardiac: normal S1 and S2     no presence of murmur          not assessed this visit                                        GI:  not assessed this visit        Extremities and Muscular Skeletal:  no edema              Neurological:  no gross motor deficits        Psych:           CC  No referring provider defined for this encounter.

## 2018-09-12 NOTE — MR AVS SNAPSHOT
After Visit Summary   9/12/2018    Shelley Mccrary    MRN: [id number]           Patient Information     Date Of Birth          1950        Visit Information        Provider Department      9/12/2018 3:15 PM Salty Lovett MD [facility]   [place]        Today's Diagnoses     Coronary artery disease involving native coronary artery of native heart without angina pectoris    -  1    Hyperlipidemia LDL goal <70        Ischemic cardiomyopathy           Follow-ups after your visit        Additional Services     Follow-Up with Cardiologist                 Future tests that were ordered for you today     Open Future Orders        Priority Expected Expires Ordered    Follow-Up with Cardiologist Routine 9/12/2019 9/13/2019 9/12/2018    NM Exercise stress test (nuc card) Routine 9/12/2019 9/13/2019 9/12/2018            Who to contact     If you have questions or need follow up information about today's clinic visit or your schedule please contact [facility]   [place] directly at [phone].  Normal or non-critical lab and imaging results will be communicated to you by Metropolisthart, letter or phone within 4 business days after the clinic has received the results. If you do not hear from us within 7 days, please contact the clinic through Fixmot or phone. If you have a critical or abnormal lab result, we will notify you by phone as soon as possible.  Submit refill requests through Colto or call your pharmacy and they will forward the refill request to us. Please allow 3 business days for your refill to be completed.          Additional Information About Your Visit        Metropolisthart Information     Colto gives you secure access to your electronic health record. If you see a primary care provider, you can also send messages to your care team and make appointments. If you have questions, please call your primary care clinic.  If you  "do not have a primary care provider, please call [phone] and they will assist you.        Care EveryWhere ID     This is your Care EveryWhere ID. This could be used by other organizations to access your [place] medical records  [id number]        Your Vitals Were     Pulse Height Pulse Oximetry BMI (Body Mass Index)          83 1.575 m (5' 2\") 97% 25.17 kg/m2         Blood Pressure from Last 3 Encounters:   09/12/18 125/82   12/18/17 127/76   12/11/17 131/80    Weight from Last 3 Encounters:   09/12/18 62.4 kg (137 lb 9.6 oz)   12/18/17 61 kg (134 lb 6.4 oz)   12/11/17 61 kg (134 lb 6.4 oz)               Primary Care Provider Office Phone # Fax #    Fabiola Paresh Mahmood [phone]       [address]  [place] [address]        Equal Access to Services     [facility] AH: Hadii aad ku hadasho Soomaali, waaxda luqadaha, qaybta kaalmada adeegyada, waxay idiin hayaan abrahan khagustín la'janetn . So [facility] [phone].    ATENCIÓN: Si habla español, tiene a hylton disposición servicios gratuitos de asistencia lingüística. [facility] [phone].    We comply with applicable federal civil rights laws and [place] laws. We do not discriminate on the basis of race, color, national origin, age, disability, sex, sexual orientation, or gender identity.            Thank you!     Thank you for choosing [facility]   [place]  for your care. Our goal is always to provide you with excellent care. Hearing back from our patients is one way we can continue to improve our services. Please take a few minutes to complete the written survey that you may receive in the mail after your visit with us. Thank you!             Your Updated Medication List - Protect others around you: Learn how to safely use, store and throw away your medicines at www.disposemymeds.org.          This list is accurate as of 9/12/18  3:33 PM.  Always use your most recent med list.                   Brand " Name Dispense Instructions for use Diagnosis    aspirin 81 MG tablet      Take by mouth daily        atorvastatin 40 MG tablet    LIPITOR    90 tablet    TAKE 1 TABLET BY MOUTH DAILY    Hyperlipidemia LDL goal <70       clobetasol 0.05 % cream    TEMOVATE          MELATONIN PO      Take 1 mg by mouth At Bedtime        metoprolol succinate 25 MG 24 hr tablet    TOPROL-XL    90 tablet    Take 1 tablet (25 mg) by mouth daily    Cardiomyopathy, unspecified       MULTI-VITAMINS Tabs      Take 1 tablet by mouth        nitroGLYcerin 0.4 MG sublingual tablet    NITROSTAT    25 tablet    Place 1 tablet (0.4 mg) under the tongue every 5 minutes as needed for chest pain If you are still having symptoms after 3 doses (15 minutes) call 911.    CAD (coronary artery disease), S/P angioplasty with stent       OMEGA-3 FISH OIL PO      Take 2 g by mouth daily        sertraline 25 MG tablet    ZOLOFT    90 tablet    TAKE 1 TABLET(25 MG) BY MOUTH DAILY    Adjustment disorder with mixed anxiety and depressed mood

## 2018-09-12 NOTE — LETTER
9/12/2018    Fabiola Mahmood MD  [address] [place] [address] [address]  [place] [address]    RE: Shelley Mccrary       Dear Colleague,    I had the pleasure of seeing Shelley Mccrary in the [facility].    HPI and Plan:    had the pleasure of seeing Shelley Mccrary in Cardiology Clinic in followup.  She is a pleasant 66-year-old female with past medical history of inferolateral myocardial infarction in July 2015 when she was admitted to [facility] and had RCA intervention.  At that time, posterolateral branch with angioplasty was also performed.  She is doing quite well.  She exercises daily.  She also lifts weights.  She is also following a low cholesterol, low-salt diet religiously.  I am quite impressed by her lifestyle modification.          She is on metoprolol XL at 25 daily.   Previously, her blood pressures are low and therefore, we had to stop her lisinopril.  She remains on aspirin.       Last lipid profile in Oct 2017 showed an LDL of 56, HDL 61.   She is on atorvastatin.      She denies any chest pain or shortness of breath. She is quite active. She walks daily with her dog and occasionally has to run to keep up with her friends dog.     PHYSICAL EXAMINATION:   See below      IMPRESSION AND PLAN:   1.  Coronary artery disease, status post inferior wall myocardial infarction in 07/2015.  She is now free of chest pain and shortness of breath. Her echo shows improvement in ejection fraction of 55-60%. She has no evidence of congestive heart failure.   She remains on aspirin and metoprolol     2. Hyperlipidemia  Improved on atorvastatin 40 mg daily and with lifestyle modification.  Last HDL 61.    Overall, she is doing well.  I recommended an exercise stress nuclear study on medications next year to assess ischemic burden on the myocardium prior to visit with me.      Sincerely,     LAINEY QUISPE MD        cc:   Fabiola Mahmood MD   [address] [place]  [address]               Orders Placed This Encounter   Procedures     NM Exercise stress test (nuc card)     Follow-Up with Cardiologist       No orders of the defined types were placed in this encounter.      There are no discontinued medications.      Encounter Diagnoses   Name Primary?     Coronary artery disease involving native coronary artery of native heart without angina pectoris Yes     Hyperlipidemia LDL goal <70      Ischemic cardiomyopathy        CURRENT MEDICATIONS:  Current Outpatient Prescriptions   Medication Sig Dispense Refill     aspirin 81 MG tablet Take by mouth daily       atorvastatin (LIPITOR) 40 MG tablet TAKE 1 TABLET BY MOUTH DAILY 90 tablet 3     metoprolol (TOPROL-XL) 25 MG 24 hr tablet Take 1 tablet (25 mg) by mouth daily 90 tablet 3     Multiple Vitamin (MULTI-VITAMINS) TABS Take 1 tablet by mouth       nitroglycerin (NITROSTAT) 0.4 MG SL tablet Place 1 tablet (0.4 mg) under the tongue every 5 minutes as needed for chest pain If you are still having symptoms after 3 doses (15 minutes) call 911. 25 tablet 3     Omega-3 Fatty Acids (OMEGA-3 FISH OIL PO) Take 2 g by mouth daily       sertraline (ZOLOFT) 25 MG tablet TAKE 1 TABLET(25 MG) BY MOUTH DAILY 90 tablet 1     clobetasol (TEMOVATE) 0.05 % cream   2     MELATONIN PO Take 1 mg by mouth At Bedtime         ALLERGIES     Allergies   Allergen Reactions     Hmg-Coa-R Inhibitors Other (See Comments)     elevated liver enzymes       PAST MEDICAL HISTORY:  Past Medical History:   Diagnosis Date     Anxiety     post MI     CAD (coronary artery disease) 7/29/2015     Depression     post MI     MRSA infection     hand      Myocardial infarction 7/2015     Vertigo     related miners        PAST SURGICAL HISTORY:  Past Surgical History:   Procedure Laterality Date     ANGIOPLASTY  7/17/2015    MAURISIO to mid circ, thrombectomy -MAURISIO to proximal, mid, distal RCA , successful balloon to 1st RPL done Resolute stents used  "procedure done at [facility]     INNER EAR SURGERY      redesign merritt faye of sid guevara        FAMILY HISTORY:  Family History   Problem Relation Age of Onset     Coronary Artery Disease Father      at age 39, and other heart problem      Hyperlipidemia Father      ?      Hypertension Mother      Diabetes No family hx of      Cerebrovascular Disease No family hx of      Breast Cancer No family hx of      Colon Cancer No family hx of        SOCIAL HISTORY:  Social History     Social History     Marital status:      Spouse name: N/A     Number of children: N/A     Years of education: N/A     Social History Main Topics     Smoking status: Former Smoker     Types: Cigarettes     Quit date: 1/1/1990     Smokeless tobacco: Never Used      Comment: smoked 1-2 pack week, 30 years     Alcohol use No     Drug use: No     Sexual activity: No     Other Topics Concern     Parent/Sibling W/ Cabg, Mi Or Angioplasty Before 65f 55m? Yes     Caffeine Concern No     Sleep Concern No     Weight Concern No     Special Diet Yes     low fats     Exercise Yes     walking  miles day, swimming, ellyptical     Seat Belt Yes     Social History Narrative    , 2 kids, non smoker quit 22 yrs ago. No alcohol social occasional , works for a company to help elderly patient        Review of Systems:  Skin:  Negative       Eyes:  Negative      ENT:  Positive for hearing loss    Respiratory:  Negative       Cardiovascular:  Negative;exercise intolerance;palpitations;chest pain;edema;syncope or near-syncope;dizziness;lightheadedness      Gastroenterology: Negative      Genitourinary:  Negative      Musculoskeletal:  Negative      Neurologic:  Positive for numbness or tingling of feet    Psychiatric:  Negative      Heme/Lymph/Imm:  Negative      Endocrine:  Negative        Physical Exam:  Vitals: /82 (BP Location: Left arm, Patient Position: Chair, Cuff Size: Adult Regular)  Pulse 83  Ht 1.575 m (5' 2\")  Wt 62.4 kg " (137 lb 9.6 oz)  SpO2 97%  BMI 25.17 kg/m2    Constitutional:  in no acute distress        Skin:  warm and dry to the touch          Head:  no masses or lesions        Eyes:  pupils equal and round        Lymph:      ENT:           Neck:  carotid pulses are full and equal bilaterally        Respiratory:  clear to auscultation         Cardiac: normal S1 and S2     no presence of murmur          not assessed this visit                                        GI:  not assessed this visit        Extremities and Muscular Skeletal:  no edema              Neurological:  no gross motor deficits        Psych:           Thank you for allowing me to participate in the care of your patient.    Sincerely,     Salty Lovett MD     [facility]

## 2018-10-23 DIAGNOSIS — E78.5 HYPERLIPIDEMIA LDL GOAL <70: ICD-10-CM

## 2018-10-23 DIAGNOSIS — I42.9 CARDIOMYOPATHY, UNSPECIFIED (H): ICD-10-CM

## 2018-10-23 RX ORDER — ATORVASTATIN CALCIUM 40 MG/1
TABLET, FILM COATED ORAL
Qty: 90 TABLET | Refills: 1 | OUTPATIENT
Start: 2018-10-23

## 2018-10-23 RX ORDER — ATORVASTATIN CALCIUM 40 MG/1
TABLET, FILM COATED ORAL
Qty: 30 TABLET | Refills: 1 | Status: SHIPPED | OUTPATIENT
Start: 2018-10-23 | End: 2018-12-11

## 2018-10-23 RX ORDER — METOPROLOL SUCCINATE 25 MG/1
25 TABLET, EXTENDED RELEASE ORAL DAILY
Qty: 90 TABLET | Refills: 3 | Status: SHIPPED | OUTPATIENT
Start: 2018-10-23 | End: 2018-12-11

## 2018-10-23 NOTE — TELEPHONE ENCOUNTER
"Requested Prescriptions   Pending Prescriptions Disp Refills     atorvastatin (LIPITOR) 40 MG tablet [Pharmacy Med Name: ATORVASTATIN 40MG TABLETS]  Last Written Prescription Date:  11/7/17  Last Fill Quantity: 90,  # refills: 3   Last office visit: 12/7/2017 with prescribing provider:  abhishek   Future Office Visit:     90 tablet 0     Sig: TAKE 1 TABLET BY MOUTH DAILY    Statins Protocol Failed    10/23/2018 11:01 AM       Failed - LDL on file in past 12 months    Recent Labs   Lab Test  10/10/17   1127   LDL  56            Passed - No abnormal creatine kinase in past 12 months    Recent Labs   Lab Test  08/06/15   0520   CKT  39               Passed - Recent (12 mo) or future (30 days) visit within the authorizing provider's specialty    Patient had office visit in the last 12 months or has a visit in the next 30 days with authorizing provider or within the authorizing provider's specialty.  See \"Patient Info\" tab in inbasket, or \"Choose Columns\" in Meds & Orders section of the refill encounter.             Passed - Patient is age 18 or older       Passed - No active pregnancy on record       Passed - No positive pregnancy test in past 12 months            "

## 2018-10-23 NOTE — TELEPHONE ENCOUNTER
"Requested Prescriptions   Pending Prescriptions Disp Refills     atorvastatin (LIPITOR) 40 MG tablet [Pharmacy Med Name: ATORVASTATIN 40MG TABLETS] 90 tablet 1     Sig: TAKE 1 TABLET BY MOUTH DAILY    Statins Protocol Failed    10/23/2018 11:51 AM       Failed - LDL on file in past 12 months    Recent Labs   Lab Test  10/10/17   1127   LDL  56            Passed - No abnormal creatine kinase in past 12 months    Recent Labs   Lab Test  08/06/15   0520   CKT  39      Last Written Prescription Date: 10/23/2018   Last Fill Quantity: ,  30# refills:  1  Last office visit: 12/7/2017 with prescribing provider:     Future Office Visit:             Passed - Recent (12 mo) or future (30 days) visit within the authorizing provider's specialty    Patient had office visit in the last 12 months or has a visit in the next 30 days with authorizing provider or within the authorizing provider's specialty.  See \"Patient Info\" tab in inbasket, or \"Choose Columns\" in Meds & Orders section of the refill encounter.             Passed - Patient is age 18 or older       Passed - No active pregnancy on record       Passed - No positive pregnancy test in past 12 months          "

## 2018-10-23 NOTE — TELEPHONE ENCOUNTER
Prescription approved per [facility] Refill Protocol.    Maryanne BURCIAGA RN  EP Triage

## 2018-12-11 ENCOUNTER — OFFICE VISIT (OUTPATIENT)
Dept: FAMILY MEDICINE | Facility: CLINIC | Age: 68
End: 2018-12-11
Payer: MEDICARE

## 2018-12-11 VITALS
SYSTOLIC BLOOD PRESSURE: 122 MMHG | HEIGHT: 62 IN | HEART RATE: 68 BPM | DIASTOLIC BLOOD PRESSURE: 75 MMHG | WEIGHT: 136 LBS | BODY MASS INDEX: 25.03 KG/M2 | OXYGEN SATURATION: 98 % | TEMPERATURE: 97.1 F

## 2018-12-11 DIAGNOSIS — I42.8 OTHER CARDIOMYOPATHY (H): ICD-10-CM

## 2018-12-11 DIAGNOSIS — Z00.00 ROUTINE HISTORY AND PHYSICAL EXAMINATION OF ADULT: Primary | ICD-10-CM

## 2018-12-11 DIAGNOSIS — Z12.11 SCREENING FOR COLON CANCER: ICD-10-CM

## 2018-12-11 DIAGNOSIS — F43.23 ADJUSTMENT DISORDER WITH MIXED ANXIETY AND DEPRESSED MOOD: ICD-10-CM

## 2018-12-11 DIAGNOSIS — H61.23 BILATERAL IMPACTED CERUMEN: ICD-10-CM

## 2018-12-11 DIAGNOSIS — Z12.39 BREAST CANCER SCREENING: ICD-10-CM

## 2018-12-11 DIAGNOSIS — Z23 NEED FOR PROPHYLACTIC VACCINATION AND INOCULATION AGAINST INFLUENZA: ICD-10-CM

## 2018-12-11 DIAGNOSIS — E78.5 HYPERLIPIDEMIA LDL GOAL <70: ICD-10-CM

## 2018-12-11 LAB
ALBUMIN SERPL-MCNC: 3.9 G/DL (ref 3.4–5)
ALP SERPL-CCNC: 73 U/L (ref 40–150)
ALT SERPL W P-5'-P-CCNC: 30 U/L (ref 0–50)
ANION GAP SERPL CALCULATED.3IONS-SCNC: 9 MMOL/L (ref 3–14)
AST SERPL W P-5'-P-CCNC: 30 U/L (ref 0–45)
BILIRUB SERPL-MCNC: 0.4 MG/DL (ref 0.2–1.3)
BUN SERPL-MCNC: 20 MG/DL (ref 7–30)
CALCIUM SERPL-MCNC: 10 MG/DL (ref 8.5–10.1)
CHLORIDE SERPL-SCNC: 106 MMOL/L (ref 94–109)
CHOLEST SERPL-MCNC: 152 MG/DL
CO2 SERPL-SCNC: 26 MMOL/L (ref 20–32)
CREAT SERPL-MCNC: 0.86 MG/DL (ref 0.52–1.04)
ERYTHROCYTE [DISTWIDTH] IN BLOOD BY AUTOMATED COUNT: 13.2 % (ref 10–15)
GFR SERPL CREATININE-BSD FRML MDRD: 65 ML/MIN/1.7M2
GLUCOSE SERPL-MCNC: 92 MG/DL (ref 70–99)
HCT VFR BLD AUTO: 44.4 % (ref 35–47)
HDLC SERPL-MCNC: 61 MG/DL
HGB BLD-MCNC: 14.7 G/DL (ref 11.7–15.7)
LDLC SERPL CALC-MCNC: 82 MG/DL
MCH RBC QN AUTO: 30.9 PG (ref 26.5–33)
MCHC RBC AUTO-ENTMCNC: 33.1 G/DL (ref 31.5–36.5)
MCV RBC AUTO: 93 FL (ref 78–100)
NONHDLC SERPL-MCNC: 91 MG/DL
PLATELET # BLD AUTO: 223 10E9/L (ref 150–450)
POTASSIUM SERPL-SCNC: 4.5 MMOL/L (ref 3.4–5.3)
PROT SERPL-MCNC: 7.5 G/DL (ref 6.8–8.8)
RBC # BLD AUTO: 4.76 10E12/L (ref 3.8–5.2)
SODIUM SERPL-SCNC: 141 MMOL/L (ref 133–144)
TRIGL SERPL-MCNC: 46 MG/DL
WBC # BLD AUTO: 7.2 10E9/L (ref 4–11)

## 2018-12-11 PROCEDURE — 69210 REMOVE IMPACTED EAR WAX UNI: CPT | Performed by: FAMILY MEDICINE

## 2018-12-11 PROCEDURE — 99213 OFFICE O/P EST LOW 20 MIN: CPT | Mod: 25 | Performed by: FAMILY MEDICINE

## 2018-12-11 PROCEDURE — 85027 COMPLETE CBC AUTOMATED: CPT | Performed by: FAMILY MEDICINE

## 2018-12-11 PROCEDURE — G0008 ADMIN INFLUENZA VIRUS VAC: HCPCS | Performed by: FAMILY MEDICINE

## 2018-12-11 PROCEDURE — 80061 LIPID PANEL: CPT | Performed by: FAMILY MEDICINE

## 2018-12-11 PROCEDURE — G0439 PPPS, SUBSEQ VISIT: HCPCS | Performed by: FAMILY MEDICINE

## 2018-12-11 PROCEDURE — 36415 COLL VENOUS BLD VENIPUNCTURE: CPT | Performed by: FAMILY MEDICINE

## 2018-12-11 PROCEDURE — 90662 IIV NO PRSV INCREASED AG IM: CPT | Performed by: FAMILY MEDICINE

## 2018-12-11 PROCEDURE — 80053 COMPREHEN METABOLIC PANEL: CPT | Performed by: FAMILY MEDICINE

## 2018-12-11 RX ORDER — ATORVASTATIN CALCIUM 40 MG/1
40 TABLET, FILM COATED ORAL DAILY
Qty: 90 TABLET | Refills: 3 | Status: SHIPPED | OUTPATIENT
Start: 2018-12-11 | End: 2019-12-09

## 2018-12-11 RX ORDER — SERTRALINE HYDROCHLORIDE 25 MG/1
25 TABLET, FILM COATED ORAL DAILY
Qty: 90 TABLET | Refills: 1 | Status: SHIPPED | OUTPATIENT
Start: 2018-12-11 | End: 2019-05-16

## 2018-12-11 RX ORDER — METOPROLOL SUCCINATE 25 MG/1
25 TABLET, EXTENDED RELEASE ORAL DAILY
Qty: 90 TABLET | Refills: 3 | Status: SHIPPED | OUTPATIENT
Start: 2018-12-11 | End: 2019-05-16

## 2018-12-11 ASSESSMENT — ANXIETY QUESTIONNAIRES
5. BEING SO RESTLESS THAT IT IS HARD TO SIT STILL: NOT AT ALL
1. FEELING NERVOUS, ANXIOUS, OR ON EDGE: SEVERAL DAYS
IF YOU CHECKED OFF ANY PROBLEMS ON THIS QUESTIONNAIRE, HOW DIFFICULT HAVE THESE PROBLEMS MADE IT FOR YOU TO DO YOUR WORK, TAKE CARE OF THINGS AT HOME, OR GET ALONG WITH OTHER PEOPLE: NOT DIFFICULT AT ALL
6. BECOMING EASILY ANNOYED OR IRRITABLE: NOT AT ALL
2. NOT BEING ABLE TO STOP OR CONTROL WORRYING: NOT AT ALL
3. WORRYING TOO MUCH ABOUT DIFFERENT THINGS: SEVERAL DAYS
7. FEELING AFRAID AS IF SOMETHING AWFUL MIGHT HAPPEN: SEVERAL DAYS
GAD7 TOTAL SCORE: 3

## 2018-12-11 ASSESSMENT — PATIENT HEALTH QUESTIONNAIRE - PHQ9
5. POOR APPETITE OR OVEREATING: NOT AT ALL
SUM OF ALL RESPONSES TO PHQ QUESTIONS 1-9: 0

## 2018-12-11 ASSESSMENT — MIFFLIN-ST. JEOR: SCORE: 1090.14

## 2018-12-11 NOTE — PATIENT INSTRUCTIONS
Preventive Health Recommendations    See your health care provider every year to    Review health changes.     Discuss preventive care.      Review your medicines if your doctor has prescribed any.      You no longer need a yearly Pap test unless you've had an abnormal Pap test in the past 10 years. If you have vaginal symptoms, such as bleeding or discharge, be sure to talk with your provider about a Pap test.      Every 1 to 2 years, have a mammogram.  If you are over 69, talk with your health care provider about whether or not you want to continue having screening mammograms.      Every 10 years, have a colonoscopy. Or, have a yearly FIT test (stool test). These exams will check for colon cancer.       Have a cholesterol test every 5 years, or more often if your doctor advises it.       Have a diabetes test (fasting glucose) every three years. If you are at risk for diabetes, you should have this test more often.       At age 65, have a bone density scan (DEXA) to check for osteoporosis (brittle bone disease).    Shots:    Get a flu shot each year.    Get a tetanus shot every 10 years.    Talk to your doctor about your pneumonia vaccines. There are now two you should receive - Pneumovax (PPSV 23) and Prevnar (PCV 13).    Talk to your pharmacist about the shingles vaccine.    Talk to your doctor about the hepatitis B vaccine.    Nutrition:     Eat at least 5 servings of fruits and vegetables each day.      Eat whole-grain bread, whole-wheat pasta and brown rice instead of white grains and rice.      Get adequate about Calcium and Vitamin D.     Lifestyle    Exercise at least 150 minutes a week (30 minutes a day, 5 days a week). This will help you control your weight and prevent disease.      Limit alcohol to one drink per day.      No smoking.       Wear sunscreen to prevent skin cancer.       See your dentist twice a year for an exam and cleaning.      See your eye doctor every 1 to 2 years to screen for  conditions such as glaucoma, macular degeneration, cataracts, etc.    Personalized Prevention Plan  You are due for the preventive services outlined below.  Your care team is available to assist you in scheduling these services.  If you have already completed any of these items, please share that information with your care team to update in your medical record.    Health Maintenance Due   Topic Date Due     Heart Failure Action Plan Reviewed Every 3 Years  1950     Zoster (Chicken Pox) Vaccine (1 of 2) 04/01/1998     Diptheria Tetanus Pertussis (DTAP/TDAP/TD) Vaccine (3 - Td) 02/17/2011     Complete Blood Count Every Year  03/31/2017     Colon Cancer Screening - every 10 years.  11/15/2017     Basic Metabolic Lab - every 6 months  06/07/2018     KALI QUESTIONNAIRE 6 MONTHS  06/07/2018     Flu Vaccine (1) 09/01/2018     Cholesterol Lab - yearly  10/10/2018     Comprehensive Metabolic Lab - yearly  12/07/2018     Mammogram - every 2 years  11/16/2018     FALL RISK ASSESSMENT  12/07/2018     Patient Education

## 2018-12-12 ENCOUNTER — ANCILLARY PROCEDURE (OUTPATIENT)
Dept: MAMMOGRAPHY | Facility: CLINIC | Age: 68
End: 2018-12-12
Attending: FAMILY MEDICINE
Payer: MEDICARE

## 2018-12-12 DIAGNOSIS — Z12.39 BREAST CANCER SCREENING: ICD-10-CM

## 2018-12-12 PROCEDURE — 77067 SCR MAMMO BI INCL CAD: CPT | Mod: TC

## 2018-12-12 ASSESSMENT — ANXIETY QUESTIONNAIRES: GAD7 TOTAL SCORE: 3

## 2018-12-14 PROCEDURE — 82274 ASSAY TEST FOR BLOOD FECAL: CPT | Performed by: FAMILY MEDICINE

## 2018-12-18 LAB — HEMOCCULT STL QL IA: NEGATIVE

## 2019-01-21 DIAGNOSIS — F43.23 ADJUSTMENT DISORDER WITH MIXED ANXIETY AND DEPRESSED MOOD: ICD-10-CM

## 2019-01-21 RX ORDER — SERTRALINE HYDROCHLORIDE 25 MG/1
TABLET, FILM COATED ORAL
Qty: 90 TABLET | Refills: 0 | Status: SHIPPED | OUTPATIENT
Start: 2019-01-21 | End: 2019-05-16

## 2019-01-21 NOTE — TELEPHONE ENCOUNTER
"Requested Prescriptions   Pending Prescriptions Disp Refills     sertraline (ZOLOFT) 25 MG tablet [Pharmacy Med Name: SERTRALINE 25MG TABLETS] 90 tablet 0     Sig: TAKE 1 TABLET(25 MG) BY MOUTH DAILY    SSRIs Protocol Passed - 1/21/2019 12:50 PM       Passed - Recent (12 mo) or future (30 days) visit within the authorizing provider's specialty    Patient had office visit in the last 12 months or has a visit in the next 30 days with authorizing provider or within the authorizing provider's specialty.  See \"Patient Info\" tab in inbasket, or \"Choose Columns\" in Meds & Orders section of the refill encounter.         Last Written Prescription Date:  12/11/2018  Last Fill Quantity: 90,  # refills: 1   Last office visit: 12/11/2018 with prescribing provider:  EVAN Mahmood  Future Office Visit:        Passed - Medication is active on med list       Passed - Patient is age 18 or older       Passed - No active pregnancy on record       Passed - No positive pregnancy test in last 12 months          "

## 2019-01-21 NOTE — TELEPHONE ENCOUNTER
Patient is in [place] now  Prescription approved per [facility] [place] Protocol.  Carla Thurston RN- Triage [employer]

## 2019-02-19 ENCOUNTER — NURSE TRIAGE (OUTPATIENT)
Dept: NURSING | Facility: CLINIC | Age: 69
End: 2019-02-19

## 2019-02-20 ENCOUNTER — TRANSFERRED RECORDS (OUTPATIENT)
Dept: HEALTH INFORMATION MANAGEMENT | Facility: CLINIC | Age: 69
End: 2019-02-20

## 2019-02-20 NOTE — TELEPHONE ENCOUNTER
Patient calling from [place]. States she fell today and broke her ankle. She is going to have it surgically repaired.She wants to know the name of the drug she is allergic to - according to chart it is HMG-COA-R Inhibitors.    Information relayed to patient.    Protocol and care advice reviewed  Caller states understanding of the recommended disposition    Advised to call back if further questions or concerns      Additional Information    Health Information question, no triage required and triager able to answer question    Protocols used: INFORMATION ONLY CALL-ADULT-

## 2019-04-12 ENCOUNTER — TELEPHONE (OUTPATIENT)
Dept: ORTHOPEDICS | Facility: CLINIC | Age: 69
End: 2019-04-12

## 2019-04-12 DIAGNOSIS — Z87.81 STATUS POST OPEN REDUCTION WITH INTERNAL FIXATION (ORIF) OF FRACTURE OF ANKLE: Primary | ICD-10-CM

## 2019-04-12 DIAGNOSIS — Z98.890 STATUS POST OPEN REDUCTION WITH INTERNAL FIXATION (ORIF) OF FRACTURE OF ANKLE: Primary | ICD-10-CM

## 2019-04-12 NOTE — TELEPHONE ENCOUNTER
Called patient and asked her to follow up with [facility] per [facility] request. patient had surgery in [place] and it is in her best interest to follow up with a foot and ankle surgeon.

## 2019-04-19 ENCOUNTER — TRANSFERRED RECORDS (OUTPATIENT)
Dept: HEALTH INFORMATION MANAGEMENT | Facility: CLINIC | Age: 69
End: 2019-04-19

## 2019-05-06 ENCOUNTER — THERAPY VISIT (OUTPATIENT)
Dept: PHYSICAL THERAPY | Facility: CLINIC | Age: 69
End: 2019-05-06
Payer: MEDICARE

## 2019-05-06 DIAGNOSIS — M25.571 PAIN IN JOINT, ANKLE AND FOOT, RIGHT: ICD-10-CM

## 2019-05-06 DIAGNOSIS — Z87.81 S/P ORIF (OPEN REDUCTION INTERNAL FIXATION) FRACTURE: ICD-10-CM

## 2019-05-06 DIAGNOSIS — Z98.890 S/P ORIF (OPEN REDUCTION INTERNAL FIXATION) FRACTURE: ICD-10-CM

## 2019-05-06 PROCEDURE — 97110 THERAPEUTIC EXERCISES: CPT | Mod: GP | Performed by: PHYSICAL THERAPIST

## 2019-05-06 PROCEDURE — 97161 PT EVAL LOW COMPLEX 20 MIN: CPT | Mod: GP | Performed by: PHYSICAL THERAPIST

## 2019-05-06 PROCEDURE — 97140 MANUAL THERAPY 1/> REGIONS: CPT | Mod: GP | Performed by: PHYSICAL THERAPIST

## 2019-05-06 NOTE — PROGRESS NOTES
[facility] Initial Evaluation  Subjective:    Shelley Mccrary is a 69 year old female with a right ankle condition.  Condition occurred with:  A fall/slip.  Condition occurred: at home.  This is a new condition  MD order. Shelley fractured her right tibia and fibula Feb 18th, 2019 in [place]. She underwent ORIF and was in cast for 2 weeks. She had a ganglion cyst on her left foot and so she thinks she is straining her right ankle. She had walking cast put on in [place] and was given knee scooter for 6 weeks and came to [place] and got it removed by Dr Braden. She was advised to go to PT for further management. .    Site of Pain: none.        Associated symptoms:  Buckling/giving out, loss of motion/stiffness, loss of strength and edema.   Symptoms are exacerbated by ascending stairs and descending stairs and relieved by ice.  Since onset symptoms are gradually improving.    Previous treatment includes surgery.  There was moderate improvement following previous treatment.  General health as reported by patient is excellent.  Pertinent medical history includes:  Heart problems and history of fractures.  Medical allergies: no.  Other surgeries include:  Orthopedic surgery and heart surgery (right tibia and fibula, stents).  Current medications:  High blood pressure medication and anti-depressants.  Current occupation is  for elderly  .    Primary job tasks include:  Driving.                                Objective:    Gait:    Weight Bearing Status:  WBAT     Deviations:  Ankle:  Heel strike decr R, pronation decr R and push off decr R    Flexibility/Screens:       Lower Extremity:      Decreased right lower extremity flexibility:  Hamstrings; Gastroc and Soleus          Ankle/Foot Evaluation  ROM:    AROM:    Dorsiflexion: Left:    Right:   14 degrees  Plantarflexion: Left:     Right:  22 degrees  Inversion: Left:      Right:  5 degrees  Eversion:     Right:  7 degrees        Strength:     Dorsiflexion:  Left: 5/5     Pain:   Right: 5-/5   Pain:  Plantarflexion: Left: 5/5   Pain:   Right: 5-/5  Pain:  Inversion:Left: 5/5  Pain:     Right: 4/5  Pain:  Eversion:Left: 5/5  Pain:    Flexion Great Toe:Left: 4-/5  Pain:    Extension Great Toe:Left: 5/5  Pain:  Right: 5-/5  Pain:              LIGAMENT TESTING: not assessed                PALPATION:     Right ankle tenderness present at:   gastroc/soleus; achilles tendon; incisional; deltoid ligament; plantar fascia; anterior talofibular ligament; posterior talofibular ligament; calcaneofibular ligament; medial malleolus; lateral malleolus; anterior tibiofibular ligament and posterior tibiofibular ligament  EDEMA: Edema ankle: moderate swelling anterior, media, lateral sides.          MOBILITY TESTING:     Tib-Fib Distal Right: hypomobile  Talocrural Right: hypomobile  Subtalar Right: hypomobile                                                        General     ROS    Assessment/Plan:    Patient is a 69 year old female with right side ankle complaints.    Patient has the following significant findings with corresponding treatment plan.                Diagnosis 1:  Right ankle fracture- ORIF lateral and posterior malleolar fracture  Pain -  hot/cold therapy, manual therapy, STS, splint/taping/bracing/orthotics, self management, education and home program  Decreased ROM/flexibility - manual therapy, therapeutic exercise, therapeutic activity and home program  Decreased joint mobility - manual therapy, therapeutic exercise, therapeutic activity and home program  Decreased strength - therapeutic exercise, therapeutic activities and home program  Impaired balance - neuro re-education, gait training, therapeutic activities, adaptive equipment/assistive device and home program  Edema - electric stimulation, cold therapy and self management/home program  Impaired gait - gait training and home program  Decreased function - therapeutic activities and home  program    Therapy Evaluation Codes:   1) History comprised of:   Personal factors that impact the plan of care:      Time since onset of symptoms.    Comorbidity factors that impact the plan of care are:      check HPI.     Medications impacting care: Check HPI.  2) Examination of Body Systems comprised of:   Body structures and functions that impact the plan of care:      Ankle.   Activity limitations that impact the plan of care are:      Jumping, Squatting/kneeling, Stairs, Standing and Walking.  3) Clinical presentation characteristics are:   Stable/Uncomplicated.  4) Decision-Making    Low complexity using standardized patient assessment instrument and/or measureable assessment of functional outcome.  Cumulative Therapy Evaluation is: Low complexity.    Previous and current functional limitations:  (See Goal Flow Sheet for this information)    Short term and Long term goals: (See Goal Flow Sheet for this information)     Communication ability:  Patient appears to be able to clearly communicate and understand verbal and written communication and follow directions correctly.  Treatment Explanation - The following has been discussed with the patient:   RX ordered/plan of care  Anticipated outcomes  Possible risks and side effects  This patient would benefit from PT intervention to resume normal activities.   Rehab potential is good.    Frequency:  1 X week, once daily  Duration:  for 10 weeks  Discharge Plan:  Achieve all LTG.  Independent in home treatment program.  Reach maximal therapeutic benefit.    Please refer to the daily flowsheet for treatment today, total treatment time and time spent performing 1:1 timed codes.

## 2019-05-06 NOTE — LETTER
DEPARTMENT OF HEALTH AND HUMAN SERVICES  [facility]    PLAN/UPDATED PLAN OF PROGRESS FOR OUTPATIENT REHABILITATION    PATIENTS NAME:  Shelley Mccrary     : 1950    PROVIDER NUMBER:    [id number]    HICN:  [id number]    PROVIDER NAME: [facility] [place]    MEDICAL RECORD NUMBER: [id number]     START OF CARE DATE:  SOC Date: 19   TYPE:  PT    PRIMARY/TREATMENT DIAGNOSIS: (Pertinent Medical Diagnosis)     Pain in joint, ankle and foot, right  S/P ORIF (open reduction internal fixation) fracture    VISITS FROM START OF CARE:  Rxs Used: 1     [facility] Initial Evaluation    Subjective:    Shelley Mccrary is a 69 year old female with a right ankle condition.  Condition occurred with:  A fall/slip.  Condition occurred: at home.  This is a new condition  MD order. Shelley fractured her right tibia and fibula 2019 in [place]. She underwent ORIF and was in cast for 2 weeks. She had a ganglion cyst on her left foot and so she thinks she is straining her right ankle. She had walking cast put on in [place] and was given knee scooter for 6 weeks and came to [place] and got it removed by Dr Braden. She was advised to go to PT for further management. .    Site of Pain: none.        Associated symptoms:  Buckling/giving out, loss of motion/stiffness, loss of strength and edema.   Symptoms are exacerbated by ascending stairs and descending stairs and relieved by ice.  Since onset symptoms are gradually improving.    Previous treatment includes surgery.  There was moderate improvement following previous treatment.  General health as reported by patient is excellent.  Pertinent medical history includes:  Heart problems and history of fractures.  Medical allergies: no.  Other surgeries include:  Orthopedic surgery and heart surgery (right tibia and fibula, stents).  Current medications:  High blood pressure medication and  anti-depressants.  Current occupation is  for elderly  Primary job tasks include:  Driving.                              PATIENTS NAME:  Shelley Mccrary     : 1950        Objective:    Gait:    Weight Bearing Status:  WBAT     Deviations:  Ankle:  Heel strike decr R, pronation decr R and push off decr R  Flexibility/Screens:   Lower Extremity:  Decreased right lower extremity flexibility:  Hamstrings; Gastroc and Soleus  Ankle/Foot Evaluation  ROM:    AROM:    Dorsiflexion: Left:    Right:   14 degrees  Plantarflexion: Left:     Right:  22 degrees  Inversion: Left:      Right:  5 degrees  Eversion:     Right:  7 degrees  Strength:    Dorsiflexion:  Left: 5/5     Pain:   Right: 5-/5   Pain:  Plantarflexion: Left: 5/5   Pain:   Right: 5-/5  Pain:  Inversion:Left: 5/5  Pain:     Right: 4/5  Pain:  Eversion:Left: 5/5  Pain:    Flexion Great Toe:Left: 4-/5  Pain:    Extension Great Toe:Left: 5/5  Pain:  Right: 5-/5  Pain:  LIGAMENT TESTING: not assessed  PALPATION:   Right ankle tenderness present at:   gastroc/soleus; achilles tendon; incisional; deltoid ligament; plantar fascia; anterior talofibular ligament; posterior talofibular ligament; calcaneofibular ligament; medial malleolus; lateral malleolus; anterior tibiofibular ligament and posterior tibiofibular ligament  EDEMA: Edema ankle: moderate swelling anterior, media, lateral sides.  MOBILITY TESTING:   Tib-Fib Distal Right: hypomobile  Talocrural Right: hypomobile  Subtalar Right: hypomobile    Assessment/Plan:      Patient is a 69 year old female with right side ankle complaints.    Patient has the following significant findings with corresponding treatment plan.                Diagnosis 1:  Right ankle fracture- ORIF lateral and posterior malleolar fracture  Pain -  hot/cold therapy, manual therapy, STS, splint/taping/bracing/orthotics, self management, education and home program  Decreased ROM/flexibility - manual therapy, therapeutic exercise,  therapeutic activity and home program                PATIENTS NAME:  Shelley Mccrary     : 1950            Decreased joint mobility - manual therapy, therapeutic exercise, therapeutic activity and home program  Decreased strength - therapeutic exercise, therapeutic activities and home program  Impaired balance - neuro re-education, gait training, therapeutic activities, adaptive equipment/assistive device and home program  Edema - electric stimulation, cold therapy and self management/home program  Impaired gait - gait training and home program  Decreased function - therapeutic activities and home program    Therapy Evaluation Codes:   1) History comprised of:   Personal factors that impact the plan of care:      Time since onset of symptoms.    Comorbidity factors that impact the plan of care are:      check HPI.     Medications impacting care: Check HPI.  2) Examination of Body Systems comprised of:   Body structures and functions that impact the plan of care:      Ankle.   Activity limitations that impact the plan of care are:      Jumping, Squatting/kneeling, Stairs, Standing and Walking.  3) Clinical presentation characteristics are:   Stable/Uncomplicated.  4) Decision-Making    Low complexity using standardized patient assessment instrument and/or measureable assessment of functional outcome.  Cumulative Therapy Evaluation is: Low complexity.  Previous and current functional limitations:  (See Goal Flow Sheet for this information)    Short term and Long term goals: (See Goal Flow Sheet for this information)   Communication ability:  Patient appears to be able to clearly communicate and understand verbal and written communication and follow directions correctly.  Treatment Explanation - The following has been discussed with the patient:   RX ordered/plan of care  Anticipated outcomes  Possible risks and side effects  This patient would benefit from PT intervention to resume normal activities.   Rehab  "potential is good.  Frequency:  1 X week, once daily  Duration:  for 10 weeks   Discharge Plan:  Achieve all LTG.  Independent in home treatment program.  Reach maximal therapeutic benefit.        PATIENTS NAME:  Shelley Mccrary     : 1950              Caregiver Signature/Credentials _____________________________ Date ________       Treating Provider: Hanane Lilly, PT   I have reviewed and certified the need for these services and plan of treatment while under my care.        PHYSICIAN'S SIGNATURE:   _________________________________________  Date___________   Lane Craig MD     Certification period:  Beginning of Cert date period: 19 to  End of Cert period date: 19     Functional Level Progress Report: Please see attached \"Goal Flow sheet for Functional level.\"    ____X____ Continue Services or       ________ DC Services                Service dates: From  SOC Date: 19 date to present                         "

## 2019-05-09 ENCOUNTER — THERAPY VISIT (OUTPATIENT)
Dept: PHYSICAL THERAPY | Facility: CLINIC | Age: 69
End: 2019-05-09
Payer: MEDICARE

## 2019-05-09 DIAGNOSIS — Z87.81 S/P ORIF (OPEN REDUCTION INTERNAL FIXATION) FRACTURE: ICD-10-CM

## 2019-05-09 DIAGNOSIS — Z98.890 S/P ORIF (OPEN REDUCTION INTERNAL FIXATION) FRACTURE: ICD-10-CM

## 2019-05-09 DIAGNOSIS — M25.571 PAIN IN JOINT, ANKLE AND FOOT, RIGHT: ICD-10-CM

## 2019-05-09 PROCEDURE — 97140 MANUAL THERAPY 1/> REGIONS: CPT | Mod: GP | Performed by: PHYSICAL THERAPIST

## 2019-05-09 PROCEDURE — 97110 THERAPEUTIC EXERCISES: CPT | Mod: GP | Performed by: PHYSICAL THERAPIST

## 2019-05-16 ENCOUNTER — OFFICE VISIT (OUTPATIENT)
Dept: FAMILY MEDICINE | Facility: CLINIC | Age: 69
End: 2019-05-16
Payer: MEDICARE

## 2019-05-16 VITALS
WEIGHT: 138 LBS | DIASTOLIC BLOOD PRESSURE: 72 MMHG | TEMPERATURE: 98.2 F | BODY MASS INDEX: 25.4 KG/M2 | HEART RATE: 85 BPM | HEIGHT: 62 IN | SYSTOLIC BLOOD PRESSURE: 114 MMHG | OXYGEN SATURATION: 97 %

## 2019-05-16 DIAGNOSIS — I42.8 OTHER CARDIOMYOPATHY (H): ICD-10-CM

## 2019-05-16 DIAGNOSIS — M54.50 ACUTE LEFT-SIDED LOW BACK PAIN WITHOUT SCIATICA: Primary | ICD-10-CM

## 2019-05-16 DIAGNOSIS — F43.23 ADJUSTMENT DISORDER WITH MIXED ANXIETY AND DEPRESSED MOOD: ICD-10-CM

## 2019-05-16 PROCEDURE — 36415 COLL VENOUS BLD VENIPUNCTURE: CPT | Performed by: FAMILY MEDICINE

## 2019-05-16 PROCEDURE — 99214 OFFICE O/P EST MOD 30 MIN: CPT | Performed by: FAMILY MEDICINE

## 2019-05-16 PROCEDURE — 80053 COMPREHEN METABOLIC PANEL: CPT | Performed by: FAMILY MEDICINE

## 2019-05-16 RX ORDER — SERTRALINE HYDROCHLORIDE 25 MG/1
25 TABLET, FILM COATED ORAL DAILY
Qty: 90 TABLET | Refills: 1 | Status: SHIPPED | OUTPATIENT
Start: 2019-05-16 | End: 2019-12-09

## 2019-05-16 RX ORDER — METOPROLOL SUCCINATE 25 MG/1
25 TABLET, EXTENDED RELEASE ORAL DAILY
Qty: 90 TABLET | Refills: 3 | Status: SHIPPED | OUTPATIENT
Start: 2019-05-16 | End: 2019-12-09

## 2019-05-16 RX ORDER — CYCLOBENZAPRINE HCL 5 MG
5 TABLET ORAL
Qty: 10 TABLET | Refills: 0 | Status: SHIPPED | OUTPATIENT
Start: 2019-05-16 | End: 2020-11-23

## 2019-05-16 ASSESSMENT — ANXIETY QUESTIONNAIRES
7. FEELING AFRAID AS IF SOMETHING AWFUL MIGHT HAPPEN: NOT AT ALL
2. NOT BEING ABLE TO STOP OR CONTROL WORRYING: NOT AT ALL
IF YOU CHECKED OFF ANY PROBLEMS ON THIS QUESTIONNAIRE, HOW DIFFICULT HAVE THESE PROBLEMS MADE IT FOR YOU TO DO YOUR WORK, TAKE CARE OF THINGS AT HOME, OR GET ALONG WITH OTHER PEOPLE: NOT DIFFICULT AT ALL
6. BECOMING EASILY ANNOYED OR IRRITABLE: NOT AT ALL
GAD7 TOTAL SCORE: 0
3. WORRYING TOO MUCH ABOUT DIFFERENT THINGS: NOT AT ALL
5. BEING SO RESTLESS THAT IT IS HARD TO SIT STILL: NOT AT ALL
1. FEELING NERVOUS, ANXIOUS, OR ON EDGE: NOT AT ALL

## 2019-05-16 ASSESSMENT — PATIENT HEALTH QUESTIONNAIRE - PHQ9
SUM OF ALL RESPONSES TO PHQ QUESTIONS 1-9: 0
5. POOR APPETITE OR OVEREATING: NOT AT ALL

## 2019-05-16 ASSESSMENT — MIFFLIN-ST. JEOR: SCORE: 1094.21

## 2019-05-16 NOTE — PROGRESS NOTES
SUBJECTIVE:   Shelley Mccrary is a 69 year old female who presents to clinic today for the following   health issues:      Back Pain    Onset: x 10 days     Description:   Location: left lower back , very localized area feels like a spasm, she thinks  it starts around 9 am after  she is up and relaxing and she think while sitting she can start feeling spasm , pushing and rubbing may help ,sometimes it goes to the to the side along the left  hip , typically it goes away after few hours and she feels fine after she get up and  move around. She thinks it feels tender at time morning spasm are worse.  not problem with sleep.  for soemtin , urinating normal.  no  abdominal pain . No  fever or chill    Character: Dull ache and nagging, shooting pain     Intensity: mild, moderate    Progression of Symptoms: same    Accompanying Signs & Symptoms:  Other symptoms: radiation of pain to the front  and bearing weight makes it worse     History:   Previous similar pain: no       Precipitating factors:   Trauma or overuse: she had broken her rt foot 02/2019, so she thinks  she may have been walking awkward and strained her back although  foot is feeling better now. she was using a knee cart for a while but she thought she was doing ok and so now concerned with back pain     Alleviating factors:  Improved by: nothing    Therapies Tried and outcome:         PROBLEMS TO ADD ON...  Hypertension Follow-up      Outpatient blood pressures are being checked at home.  Results are god .    Low Salt Diet: no added salt, had seen cardiologist recently and had her echo few months ago     Depression and Anxiety Follow-Up    Status since last visit: No change    Other associated symptoms:None    Complicating factors:     Significant life event: No     Current substance abuse: None    PHQ 12/7/2017 7/26/2018 12/11/2018   PHQ-9 Total Score 0 0 0   Q9: Thoughts of better off dead/self-harm past 2 weeks Not at all Not at all Not at all      KALI-7 SCORE 2018   Total Score - - -   Total Score 1 (minimal anxiety) - -   Total Score - 0 3       PHQ-9  English  PHQ-9   Any Language  KALI-7  Suicide Assessment Five-step Evaluation and Treatment (SAFE-T)    Additional history: as documented    Reviewed  and updated as needed this visit by clinical staff         Reviewed and updated as needed this visit by Provider         Patient Active Problem List   Diagnosis     CAD (coronary artery disease)     Hyperlipidemia LDL goal <70     Disturbance of skin sensation     Elevated LFTs     Adjustment disorder with mixed anxiety and depressed mood     Myocardial infarction (H)     Ischemic cardiomyopathy     Palpitations     Acute myocardial infarction of other inferior wall, initial episode of care     Anxiety     Coronary artery disease involving native coronary artery of native heart without angina pectoris     Tinnitus, bilateral     Gastroesophageal reflux disease, esophagitis presence not specified     Cardiomyopathy, unspecified (H)     Pain in joint, ankle and foot, right     S/P ORIF (open reduction internal fixation) fracture     Past Surgical History:   Procedure Laterality Date     ANGIOPLASTY  2015    MAURISIO to mid circ, thrombectomy -MAURISIO to proximal, mid, distal RCA , successful balloon to 1st RPL done Resolute stents used procedure done at [facility]     INNER EAR SURGERY      redesign [facility] of miners diseas        Social History     Tobacco Use     Smoking status: Former Smoker     Types: Cigarettes     Last attempt to quit: 1990     Years since quittin.3     Smokeless tobacco: Never Used     Tobacco comment: smoked 1-2 pack week, 30 years   Substance Use Topics     Alcohol use: No     Alcohol/week: 0.0 oz     Family History   Problem Relation Age of Onset     Coronary Artery Disease Father         at age 39, and other heart problem      Hyperlipidemia Father         ?      Hypertension Mother       "Diabetes No family hx of      Cerebrovascular Disease No family hx of      Breast Cancer No family hx of      Colon Cancer No family hx of            ROS:  Constitutional, HEENT, cardiovascular, pulmonary, GI, , musculoskeletal, neuro, skin, endocrine and psych systems are negative, except as otherwise noted.    OBJECTIVE:     /72   Pulse 85   Temp 98.2  F (36.8  C) (Tympanic)   Ht 1.575 m (5' 2\")   Wt 62.6 kg (138 lb)   SpO2 97%   BMI 25.24 kg/m    Body mass index is 25.24 kg/m .  GENERAL: healthy, alert and no distress  RESP: lungs clear to auscultation - no rales, rhonchi or wheezes  CV: regular rate and rhythm, normal S1 S2, no S3 or S4,  no peripheral edema  ABDOMEN: soft, nontender, no hepatosplenomegaly,no CVA tenderness  no masses and bowel sounds normal  MS: decreased range of motion  and has localized tenderness to palpation on left lower back paraspinal muscle in lumber area   NEURO: Normal strength and tone, mentation intact and speech normal  PSYCH: mentation appears normal, affect normal/bright        ASSESSMENT/PLAN:         (M54.5) Acute left-sided low back pain without sciatica  (primary encounter diagnosis)  Comment: localized muscle spasm trigger point on left back   Plan: cyclobenzaprine (FLEXERIL) 5 MG tablet           discussed back  cares and symptomatic treatment including  adequate pain control, heat,  stretches etc.  Script faxed   she will do follow up if no improvement or problem. Consider further evaluation and  physical therapy if needed.       (F43.23) Adjustment disorder with mixed anxiety and depressed mood  Comment: stable doing well , wish to continue with med's   Plan: sertraline (ZOLOFT) 25 MG tablet            (I42.8) Other cardiomyopathy (H)  Comment: stable , due for labs and need refill   Plan: metoprolol succinate ER (TOPROL-XL) 25 MG 24 hr        tablet, Comprehensive metabolic panel          Script  sent.Cares and  treatment discussed follow. up if problem "   Patient expressed understanding and agreement with treatment plan. All patient's questions were answered, will let me know if has more later.  Medications: Rx's: Reviewed the potential side effects/complications of medications prescribed.       Fabiola Mahmood MD  [facility]

## 2019-05-16 NOTE — PATIENT INSTRUCTIONS
Patient Education     Back Care Tips    Caring for your back  These are things you can do to prevent a recurrence of acute back pain and to reduce symptoms from chronic back pain:    Maintain a healthy weight. If you are overweight, losing weight will help most types of back pain.    Exercise is an important part of recovery from most types of back pain. The muscles behind and in front of the spine support the back. This means strengthening both the back muscles and the abdominal muscles will provide better support for your spine.     Swimming and brisk walking are good overall exercises to improve your fitness level.    Practice safe lifting methods (below).    Practice good posture when sitting, standing and walking. Avoid prolonged sitting. This puts more stress on the lower back than standing or walking.    Wear quality shoes with sufficient arch support. Foot and ankle alignment can affect back symptoms. Women should avoid wearing high heels.    Therapeutic massage can help relax the back muscles without stretching them.    During the first 24 to 72 hours after an acute injury or flare-up of chronic back pain, apply an ice pack to the painful area for 20 minutes and then remove it for 20 minutes, over a period of 60 to 90 minutes, or several times a day. As a safety precaution, do not use a heating pad at bedtime. Sleeping on a heating pad can lead to skin burns or tissue damage.    You can alternate ice and heat therapies.  Medicines  Talk to your healthcare provider before using medicines, especially if you have other medical problems or are taking other medicines.    You may use acetaminophen or ibuprofen to control pain, unless your healthcare provider prescribed other pain medicine. If you have chronic conditions like diabetes, liver or kidney disease, stomach ulcers, or gastrointestinal bleeding, or are taking blood thinners, talk with your healthcare provider before taking any medicines.    Be careful  if you are given prescription pain medicines, narcotics, or medicine for muscle spasm. They can cause drowsiness, affect your coordination, reflexes, and judgment. Do not drive or operate heavy machinery while taking these types of medicines. Take prescription pain medicine only as prescribed by your healthcare provider.  Lumbar stretch  Here is a simple stretching exercise that will help relax muscle spasm and keep your back more limber. If exercise makes your back pain worse, don t do it.    Lie on your back with your knees bent and both feet on the ground.    Slowly raise your left knee to your chest as you flatten your lower back against the floor. Hold for 5 seconds.    Relax and repeat the exercise with your right knee.    Do 10 of these exercises for each leg.  Safe lifting method    Don t bend over at the waist to lift an object off the floor.  Instead, bend your knees and hips in a squat.     Keep your back and head upright    Hold the object close to your body, directly in front of you.    Straighten your legs to lift the object.     Lower the object to the floor in the reverse fashion.    If you must slide something across the floor, push it.  Posture tips  Sitting  Sit in chairs with straight backs or low-back support. Keep your knees lower than your hips, with your feet flat on the floor.  When driving, sit up straight. Adjust the seat forward so you are not leaning toward the steering wheel.  A small pillow or rolled towel behind your lower back may help if you are driving long distances.   Standing  When standing for long periods, shift most of your weight to one leg at a time. Alternate legs every few minutes.   Sleeping  The best way to sleep is on your side with your knees bent. Put a low pillow under your head to support your neck in a neutral spine position. Avoid thick pillows that bend your neck to one side. Put a pillow between your legs to further relax your lower back. If you sleep on your  back, put pillows under your knees to support your legs in a slightly flexed position. Use a firm mattress. If your mattress sags, replace it, or use a 1/2-inch plywood board under the mattress to add support.  Follow-up care  Follow up with your healthcare provider, or as advised.  If X-rays, a CT scan or an MRI scan were taken, they will be reviewed by a radiologist. You will be notified of any new findings that may affect your care.  Call 911  Call 911 if any of the following occur:    Trouble breathing    Confusion    Very drowsy    Fainting or loss of consciousness    Rapid or very slow heart rate    Loss of  bowel or bladder control  When to seek medical advice  Call your healthcare provider right away if any of the following occur:    Pain becomes worse or spreads to your arms or legs    Weakness or numbness in one or both arms or legs    Numbness in the groin area  Date Last Reviewed: 6/1/2016 2000-2018 [employer]. [address] [place] [address]. All rights reserved. This information is not intended as a substitute for professional medical care. Always follow your healthcare professional's instructions.           Patient Education     Relieving Back Pain  Back pain is a common problem. You can strain back muscles by lifting too much weight or just by moving the wrong way. Back strain can be uncomfortable, even painful. And it can take weeks or months to improve. To help yourself feel better and prevent future back strains, try these tips.  Important: Don't give aspirin to children or teens without first discussing it with your child's healthcare provider.  Ice    Ice reduces muscle pain and swelling. It helps most during the first 24 to 48 hours after an injury.    Wrap an ice pack or a bag of frozen peas in a thin towel. Never put ice directly on your skin.    Place the ice where your back hurts the most.    Don t ice for more than 20 minutes at a time.    You can use ice  several times a day.  Medicines  Over-the-counter pain relievers include acetaminophen and anti-inflammatory medicines, which includes aspirin, naproxen, or ibuprofen. They can help ease discomfort. Some also reduce swelling.    Tell your healthcare provider about any medicines you are already taking.    Take medicines only as directed.  Manipulation and massage  Having manipulation by an osteopathic doctor or chiropractor may be helpful. Getting a massage also may help.   Heat  After the first 48 hours, heat can relax sore muscles and improve blood flow.    Try a warm bath or shower. Or use a heating pad set on low. To prevent a burn, keep a cloth between you and the heating pad.    Don t use a heating pad for more than 15 minutes at a time. Never sleep on a heating pad.  Date Last Reviewed: 6/1/2018 2000-2018 [employer]. [address] [place] [address], [place] [address]. All rights reserved. This information is not intended as a substitute for professional medical care. Always follow your healthcare professional's instructions.

## 2019-05-17 LAB
ALBUMIN SERPL-MCNC: 3.7 G/DL (ref 3.4–5)
ALP SERPL-CCNC: 88 U/L (ref 40–150)
ALT SERPL W P-5'-P-CCNC: 31 U/L (ref 0–50)
ANION GAP SERPL CALCULATED.3IONS-SCNC: 7 MMOL/L (ref 3–14)
AST SERPL W P-5'-P-CCNC: 30 U/L (ref 0–45)
BILIRUB SERPL-MCNC: 0.3 MG/DL (ref 0.2–1.3)
BUN SERPL-MCNC: 20 MG/DL (ref 7–30)
CALCIUM SERPL-MCNC: 9.4 MG/DL (ref 8.5–10.1)
CHLORIDE SERPL-SCNC: 109 MMOL/L (ref 94–109)
CO2 SERPL-SCNC: 27 MMOL/L (ref 20–32)
CREAT SERPL-MCNC: 0.94 MG/DL (ref 0.52–1.04)
GFR SERPL CREATININE-BSD FRML MDRD: 61 ML/MIN/{1.73_M2}
GLUCOSE SERPL-MCNC: 119 MG/DL (ref 70–99)
POTASSIUM SERPL-SCNC: 4.2 MMOL/L (ref 3.4–5.3)
PROT SERPL-MCNC: 7 G/DL (ref 6.8–8.8)
SODIUM SERPL-SCNC: 143 MMOL/L (ref 133–144)

## 2019-05-17 ASSESSMENT — ANXIETY QUESTIONNAIRES: GAD7 TOTAL SCORE: 0

## 2019-05-23 ENCOUNTER — THERAPY VISIT (OUTPATIENT)
Dept: PHYSICAL THERAPY | Facility: CLINIC | Age: 69
End: 2019-05-23
Payer: MEDICARE

## 2019-05-23 DIAGNOSIS — M25.571 PAIN IN JOINT, ANKLE AND FOOT, RIGHT: ICD-10-CM

## 2019-05-23 DIAGNOSIS — Z87.81 S/P ORIF (OPEN REDUCTION INTERNAL FIXATION) FRACTURE: ICD-10-CM

## 2019-05-23 DIAGNOSIS — Z98.890 S/P ORIF (OPEN REDUCTION INTERNAL FIXATION) FRACTURE: ICD-10-CM

## 2019-05-23 PROCEDURE — 97110 THERAPEUTIC EXERCISES: CPT | Mod: GP | Performed by: PHYSICAL THERAPIST

## 2019-05-23 PROCEDURE — 97140 MANUAL THERAPY 1/> REGIONS: CPT | Mod: GP | Performed by: PHYSICAL THERAPIST

## 2019-06-30 ENCOUNTER — NURSE TRIAGE (OUTPATIENT)
Dept: NURSING | Facility: CLINIC | Age: 69
End: 2019-06-30

## 2019-06-30 NOTE — TELEPHONE ENCOUNTER
I explained to her that the MD won't prescribe after hours and on weekends to treat without being evaluated. I offered to set up a call back for tomorrow. She said she'll check on line for a visit.  She has had bladder infections in the past. She is having frequency, burning with urination. She is out of town.  Ijeoma Chaney RN-[facility]

## 2019-07-11 ENCOUNTER — OFFICE VISIT (OUTPATIENT)
Dept: FAMILY MEDICINE | Facility: CLINIC | Age: 69
End: 2019-07-11
Payer: MEDICARE

## 2019-07-11 VITALS
OXYGEN SATURATION: 98 % | DIASTOLIC BLOOD PRESSURE: 80 MMHG | WEIGHT: 138 LBS | HEART RATE: 77 BPM | TEMPERATURE: 96.3 F | BODY MASS INDEX: 25.24 KG/M2 | SYSTOLIC BLOOD PRESSURE: 120 MMHG

## 2019-07-11 DIAGNOSIS — N30.01 ACUTE CYSTITIS WITH HEMATURIA: ICD-10-CM

## 2019-07-11 DIAGNOSIS — R82.90 NONSPECIFIC FINDING ON EXAMINATION OF URINE: ICD-10-CM

## 2019-07-11 DIAGNOSIS — R30.0 DYSURIA: Primary | ICD-10-CM

## 2019-07-11 LAB
ALBUMIN UR-MCNC: 30 MG/DL
APPEARANCE UR: ABNORMAL
BACTERIA #/AREA URNS HPF: ABNORMAL /HPF
BILIRUB UR QL STRIP: NEGATIVE
COLOR UR AUTO: YELLOW
GLUCOSE UR STRIP-MCNC: NEGATIVE MG/DL
HGB UR QL STRIP: ABNORMAL
KETONES UR STRIP-MCNC: NEGATIVE MG/DL
LEUKOCYTE ESTERASE UR QL STRIP: ABNORMAL
NITRATE UR QL: NEGATIVE
PH UR STRIP: 6 PH (ref 5–7)
RBC #/AREA URNS AUTO: ABNORMAL /HPF
SOURCE: ABNORMAL
SP GR UR STRIP: 1.01 (ref 1–1.03)
UROBILINOGEN UR STRIP-ACNC: 0.2 EU/DL (ref 0.2–1)
WBC #/AREA URNS AUTO: >100 /HPF

## 2019-07-11 PROCEDURE — 87086 URINE CULTURE/COLONY COUNT: CPT | Performed by: NURSE PRACTITIONER

## 2019-07-11 PROCEDURE — 87186 SC STD MICRODIL/AGAR DIL: CPT | Performed by: NURSE PRACTITIONER

## 2019-07-11 PROCEDURE — 81001 URINALYSIS AUTO W/SCOPE: CPT | Performed by: NURSE PRACTITIONER

## 2019-07-11 PROCEDURE — 99213 OFFICE O/P EST LOW 20 MIN: CPT | Performed by: NURSE PRACTITIONER

## 2019-07-11 PROCEDURE — 87088 URINE BACTERIA CULTURE: CPT | Performed by: NURSE PRACTITIONER

## 2019-07-11 RX ORDER — NITROFURANTOIN 25; 75 MG/1; MG/1
100 CAPSULE ORAL 2 TIMES DAILY
Qty: 10 CAPSULE | Refills: 0 | Status: SHIPPED | OUTPATIENT
Start: 2019-07-11 | End: 2019-10-15

## 2019-07-11 NOTE — PROGRESS NOTES
Subjective     Shelley Mccrary is a 69 year old female who presents to clinic today for the following health issues:    HPI   URINARY TRACT SYMPTOMS    Onset: 2 days [place]     Description:   Painful urination (Dysuria): YES   Blood in urine (Hematuria): no   Delay in urine (Hesitency): no     Intensity: moderate    Progression of Symptoms:  intermittent    Accompanying Signs & Symptoms:  Fever/chills: no   Flank pain no   Nausea and vomiting: no   Any vaginal symptoms: none  Abdominal/Pelvic Pain: no     History:   History of frequent UTI's: no   History of kidney stones: no   Sexually Active: no   Possibility of pregnancy: No    Precipitating factors:   [email or web address]     Therapies Tried and outcome: Cranberry juice prn (contraindicated in Coumadin patients)    HPI: Shelley presents today with the complaint of urinary frequency and dysuria. No blood, malodor, sediment, fever, chills, flank pain. Has tried cranberry juice. No nausea, vomiting, diarrhea.     Patient Active Problem List   Diagnosis     CAD (coronary artery disease)     Hyperlipidemia LDL goal <70     Disturbance of skin sensation     Elevated LFTs     Adjustment disorder with mixed anxiety and depressed mood     Myocardial infarction (H)     Ischemic cardiomyopathy     Palpitations     Acute myocardial infarction of other inferior wall, initial episode of care     Anxiety     Coronary artery disease involving native coronary artery of native heart without angina pectoris     Tinnitus, bilateral     Gastroesophageal reflux disease, esophagitis presence not specified     Cardiomyopathy, unspecified (H)     Pain in joint, ankle and foot, right     S/P ORIF (open reduction internal fixation) fracture     Past Surgical History:   Procedure Laterality Date     ANGIOPLASTY  7/17/2015    MAURISIO to mid circ, thrombectomy -MAURISIO to proximal, mid, distal RCA , successful balloon to 1st RPL done Resolute stents used  procedure done at [facility]     INNER EAR SURGERY      redesign merritt faye of miners diseas        Social History     Tobacco Use     Smoking status: Former Smoker     Types: Cigarettes     Last attempt to quit: 1990     Years since quittin.5     Smokeless tobacco: Never Used     Tobacco comment: smoked 1-2 pack week, 30 years   Substance Use Topics     Alcohol use: No     Alcohol/week: 0.0 oz     Family History   Problem Relation Age of Onset     Coronary Artery Disease Father         at age 39, and other heart problem      Hyperlipidemia Father         ?      Hypertension Mother      Diabetes No family hx of      Cerebrovascular Disease No family hx of      Breast Cancer No family hx of      Colon Cancer No family hx of            Reviewed and updated as needed this visit by Provider  Tobacco  Allergies  Meds  Problems  Med Hx  Surg Hx  Fam Hx         Review of Systems   ROS COMP: Constitutional, GI,  systems are negative, except as otherwise noted.      Objective    /80 (BP Location: Left arm, Patient Position: Chair, Cuff Size: Adult Regular)   Pulse 77   Temp 96.3  F (35.7  C) (Tympanic)   Wt 62.6 kg (138 lb)   SpO2 98%   BMI 25.24 kg/m    Body mass index is 25.24 kg/m .  Physical Exam   GENERAL: healthy, alert and no distress  NEURO: Normal strength and tone, mentation intact and speech normal  BACK: no CVA tenderness, no paralumbar tenderness    Diagnostic Test Results:  Results for orders placed or performed in visit on 19 (from the past 24 hour(s))   *UA reflex to Microscopic and Culture ([facility] (except [place] and [place])   Result Value Ref Range    Color Urine Yellow     Appearance Urine Cloudy     Glucose Urine Negative NEG^Negative mg/dL    Bilirubin Urine Negative NEG^Negative    Ketones Urine Negative NEG^Negative mg/dL    Specific Gravity Urine 1.010 1.003 - 1.035    Blood Urine Large (A) NEG^Negative    pH Urine 6.0 5.0 - 7.0 pH  "   Protein Albumin Urine 30 (A) NEG^Negative mg/dL    Urobilinogen Urine 0.2 0.2 - 1.0 EU/dL    Nitrite Urine Negative NEG^Negative    Leukocyte Esterase Urine Moderate (A) NEG^Negative    Source Midstream Urine    Urine Microscopic   Result Value Ref Range    WBC Urine >100 (A) OTO5^0 - 5 /HPF    RBC Urine O - 2 OTO2^O - 2 /HPF    Bacteria Urine Moderate (A) NEG^Negative /HPF           Assessment & Plan     Shelley was seen today for urinary problem. UA demonstrates UTI. No red flags. Will use nitrofurantoin for 5 days. Will follow up with her if C & S report dictates change in antimicrobial therapy. Emphasized pushing fluids. Recommended AZO for dysuria. Discussed reasons to call or return to clinic. Shelley Mccrary acknowledges and demonstrates understanding of circumstances under which care should be sought urgently or emergently. Follow up as discussed. Discussed risks, benefits, alternatives, potential side effects, and proper administration of new medication / treatment. Agrees with plan of care. All questions answered.     Diagnoses and all orders for this visit:    Dysuria  -     *UA reflex to Microscopic and Culture ([facility] (except [place] and [place])  -     Urine Microscopic    Nonspecific finding on examination of urine  -     Urine Culture Aerobic Bacterial    Acute cystitis with hematuria  -     nitroFURantoin macrocrystal-monohydrate (MACROBID) 100 MG capsule; Take 1 capsule (100 mg) by mouth 2 times daily for 5 days         BMI:   Estimated body mass index is 25.24 kg/m  as calculated from the following:    Height as of 5/16/19: 1.575 m (5' 2\").    Weight as of this encounter: 62.6 kg (138 lb).       See Patient Instructions    Return in about 6 days (around 7/17/2019) for persistent or worsening symptoms.    Jason Motta NP  [facility] [place]      "

## 2019-07-11 NOTE — PATIENT INSTRUCTIONS
I'll call you with culture and sensitivity report if we need to switch therapy.    Push fluids and use AZO (pyridium) for dysuria.

## 2019-07-13 LAB
BACTERIA SPEC CULT: ABNORMAL
SPECIMEN SOURCE: ABNORMAL

## 2019-08-28 PROBLEM — M25.571 PAIN IN JOINT, ANKLE AND FOOT, RIGHT: Status: RESOLVED | Noted: 2019-05-06 | Resolved: 2019-08-28

## 2019-08-28 PROBLEM — Z87.81 S/P ORIF (OPEN REDUCTION INTERNAL FIXATION) FRACTURE: Status: RESOLVED | Noted: 2019-05-06 | Resolved: 2019-08-28

## 2019-08-28 PROBLEM — Z98.890 S/P ORIF (OPEN REDUCTION INTERNAL FIXATION) FRACTURE: Status: RESOLVED | Noted: 2019-05-06 | Resolved: 2019-08-28

## 2019-08-28 NOTE — PROGRESS NOTES
Discharge Note    Progress reporting period is from last progress note on   to May 23, 2019.    Shelley failed to follow up and current status is unknown.  Please see information below for last relevant information on current status.  Patient seen for 3 visits.    SUBJECTIVE  Subjective changes noted by patient:  Shelley mentions her motion adn walking her getting better, Able to put full weight in her left leg without much discomfort.   .  Current pain level is 0/10.     Previous pain level was   .   Changes in function:  Yes (See Goal flowsheet attached for changes in current functional level)  Adverse reaction to treatment or activity: None    OBJECTIVE  Changes noted in objective findings: Moderate swelling still present - anterior  aspect. Improving PF motion but limited DF motion today due to swelling. MMT 4/5. Progressign with stregnthening exercises     ASSESSMENT/PLAN  Diagnosis: R ankle pain- S/P ORIF   Updated problem list and treatment plan:     STG/LTGs have been met or progress has been made towards goals:  Yes, please see goal flowsheet for most current information  Assessment of Progress: current status is unknown.    Last current status: Pt is progressing as expected   Self Management Plans:  HEP  I have re-evaluated this patient and find that the nature, scope, duration and intensity of the therapy is appropriate for the medical condition of the patient.  Shelley continues to require the following intervention to meet STG and LTG's:  HEP.    Recommendations:  Discharge with current home program.  Patient to follow up with MD as needed.    Please refer to the daily flowsheet for treatment today, total treatment time and time spent performing 1:1 timed codes.

## 2019-09-10 ENCOUNTER — HOSPITAL ENCOUNTER (OUTPATIENT)
Dept: CARDIOLOGY | Facility: CLINIC | Age: 69
Discharge: HOME | End: 2019-09-10
Attending: INTERNAL MEDICINE | Admitting: INTERNAL MEDICINE
Payer: MEDICARE

## 2019-09-10 DIAGNOSIS — I25.10 CORONARY ARTERY DISEASE INVOLVING NATIVE CORONARY ARTERY OF NATIVE HEART WITHOUT ANGINA PECTORIS: ICD-10-CM

## 2019-09-10 PROCEDURE — 34300033 ZZH RX 343: Performed by: INTERNAL MEDICINE

## 2019-09-10 PROCEDURE — 93016 CV STRESS TEST SUPVJ ONLY: CPT | Performed by: INTERNAL MEDICINE

## 2019-09-10 PROCEDURE — 78452 HT MUSCLE IMAGE SPECT MULT: CPT | Mod: 26 | Performed by: INTERNAL MEDICINE

## 2019-09-10 PROCEDURE — 93017 CV STRESS TEST TRACING ONLY: CPT

## 2019-09-10 PROCEDURE — 93018 CV STRESS TEST I&R ONLY: CPT | Performed by: INTERNAL MEDICINE

## 2019-09-10 PROCEDURE — A9502 TC99M TETROFOSMIN: HCPCS | Performed by: INTERNAL MEDICINE

## 2019-09-10 RX ADMIN — TETROFOSMIN 9.72 MCI.: 1.38 INJECTION, POWDER, LYOPHILIZED, FOR SOLUTION INTRAVENOUS at 09:47

## 2019-09-10 RX ADMIN — TETROFOSMIN 3.5 MCI.: 1.38 INJECTION, POWDER, LYOPHILIZED, FOR SOLUTION INTRAVENOUS at 08:23

## 2019-09-11 ENCOUNTER — OFFICE VISIT (OUTPATIENT)
Dept: CARDIOLOGY | Facility: CLINIC | Age: 69
End: 2019-09-11
Attending: INTERNAL MEDICINE
Payer: MEDICARE

## 2019-09-11 VITALS
OXYGEN SATURATION: 99 % | SYSTOLIC BLOOD PRESSURE: 138 MMHG | WEIGHT: 139 LBS | HEIGHT: 62 IN | DIASTOLIC BLOOD PRESSURE: 80 MMHG | HEART RATE: 91 BPM | BODY MASS INDEX: 25.58 KG/M2

## 2019-09-11 DIAGNOSIS — I25.10 CORONARY ARTERY DISEASE INVOLVING NATIVE CORONARY ARTERY OF NATIVE HEART WITHOUT ANGINA PECTORIS: Primary | ICD-10-CM

## 2019-09-11 DIAGNOSIS — E78.5 HYPERLIPIDEMIA LDL GOAL <70: ICD-10-CM

## 2019-09-11 PROCEDURE — 99214 OFFICE O/P EST MOD 30 MIN: CPT | Performed by: INTERNAL MEDICINE

## 2019-09-11 ASSESSMENT — MIFFLIN-ST. JEOR: SCORE: 1098.75

## 2019-09-11 NOTE — PROGRESS NOTES
HPI and Plan:    had the pleasure of seeing Shelley Mccrary in Cardiology Clinic in followup.  She is a pleasant 69-year-old female with past medical history of inferolateral myocardial infarction in July 2015 when she was admitted to [facility] and had RCA intervention.  At that time, posterolateral branch with angioplasty was also performed.      She is doing quite well.  She exercises daily - walks 2-4 mils and swims.  She also lifts weights.  She is also following a low cholesterol, low-salt diet religiously.         She is on metoprolol XL at 25 daily.   Previously, her blood pressures are low and therefore, we had to stop her lisinopril.  She remains on aspirin.       Last lipid profile in 12/18 showed an LDL of 82, HDL 61.   She is on atorvastatin.      She denies any chest pain or shortness of breath.      She had an exercise stress nuclear study yesterday that revealed inferolateral scar without ischemia.  She had excess capacity of 12.8 METS without any EKG changes or chest pain.  Ejection fraction was 53%.    PHYSICAL EXAMINATION:   See below      IMPRESSION AND PLAN:   1.  Coronary artery disease, status post inferior wall myocardial infarction in 07/2015.  She is now free of chest pain and shortness of breath.  As noted above, the recent stress nuclear study was negative for ischemia.  She remains on aspirin and metoprolol.     2. Hyperlipidemia  Improved on atorvastatin 40 mg daily and with lifestyle modification.   Her LDL is somewhat higher but she understands importance of lowering saturated fats.  She will get rechecked again with her primary care physician next year and her LDL still high, may need additional dose of atorvastatin to 80 mg.     Overall, she is doing well.  Since she is doing well, we will see her back in follow-up in 2 years or earlier as needed.    Sincerely,     LAINEY QUISPE MD        cc:   Fabiola Mahmood MD   [address] [place]  [address]     Orders Placed This Encounter   Procedures     Follow-Up with Cardiologist       No orders of the defined types were placed in this encounter.      There are no discontinued medications.      Encounter Diagnoses   Name Primary?     Coronary artery disease involving native coronary artery of native heart without angina pectoris Yes     Hyperlipidemia LDL goal <70        CURRENT MEDICATIONS:  Current Outpatient Medications   Medication Sig Dispense Refill     aspirin 81 MG tablet Take by mouth daily       atorvastatin (LIPITOR) 40 MG tablet Take 1 tablet (40 mg) by mouth daily 90 tablet 3     clobetasol (TEMOVATE) 0.05 % cream   2     metoprolol succinate ER (TOPROL-XL) 25 MG 24 hr tablet Take 1 tablet (25 mg) by mouth daily 90 tablet 3     Multiple Vitamin (MULTI-VITAMINS) TABS Take 1 tablet by mouth       nitroglycerin (NITROSTAT) 0.4 MG SL tablet Place 1 tablet (0.4 mg) under the tongue every 5 minutes as needed for chest pain If you are still having symptoms after 3 doses (15 minutes) call 911. 25 tablet 3     Omega-3 Fatty Acids (OMEGA-3 FISH OIL PO) Take 2 g by mouth daily       sertraline (ZOLOFT) 25 MG tablet Take 1 tablet (25 mg) by mouth daily 90 tablet 1     cyclobenzaprine (FLEXERIL) 5 MG tablet Take 1 tablet (5 mg) by mouth nightly as needed for muscle spasms (Patient not taking: Reported on 9/11/2019) 10 tablet 0     MELATONIN PO Take 1 mg by mouth At Bedtime         ALLERGIES     Allergies   Allergen Reactions     Hmg-Coa-R Inhibitors Other (See Comments)     elevated liver enzymes       PAST MEDICAL HISTORY:  Past Medical History:   Diagnosis Date     Anxiety     post MI     CAD (coronary artery disease) 7/29/2015     Depression     post MI     MRSA infection     hand      Myocardial infarction (H) 7/2015     Vertigo     related miners        PAST SURGICAL HISTORY:  Past Surgical History:   Procedure Laterality Date     ANGIOPLASTY  7/17/2015    MAURISIO to mid circ, thrombectomy -MAURISIO  to proximal, mid, distal RCA , successful balloon to 1st RPL done Resolute stents used [facility] done at [facility]     INNER EAR SURGERY      redesign merritt faye of minerjaci guevara        FAMILY HISTORY:  Family History   Problem Relation Age of Onset     Coronary Artery Disease Father         at age 39, and other heart problem      Hyperlipidemia Father         ?      Hypertension Mother      Diabetes No family hx of      Cerebrovascular Disease No family hx of      Breast Cancer No family hx of      Colon Cancer No family hx of        SOCIAL HISTORY:  Social History     Socioeconomic History     Marital status:      Spouse name: None     Number of children: None     Years of education: None     Highest education level: None   Occupational History     None   Social Needs     Financial resource strain: None     Food insecurity:     Worry: None     Inability: None     Transportation needs:     Medical: None     Non-medical: None   Tobacco Use     Smoking status: Former Smoker     Types: Cigarettes     Last attempt to quit: 1990     Years since quittin.7     Smokeless tobacco: Never Used     Tobacco comment: smoked 1-2 pack week, 30 years   Substance and Sexual Activity     Alcohol use: No     Alcohol/week: 0.0 oz     Drug use: No     Sexual activity: Never   Lifestyle     Physical activity:     Days per week: None     Minutes per session: None     Stress: None   Relationships     Social connections:     Talks on phone: None     Gets together: None     Attends L[employer] day service: None     Active member of club or organization: None     Attends meetings of clubs or organizations: None     Relationship status: None     Intimate partner violence:     Fear of current or ex partner: None     Emotionally abused: None     Physically abused: None     Forced sexual activity: None   Other Topics Concern     Parent/sibling w/ CABG, MI or angioplasty before 65F 55M? Yes      Service Not Asked      "Blood Transfusions Not Asked     Caffeine Concern No     Occupational Exposure Not Asked     Hobby Hazards Not Asked     Sleep Concern No     Stress Concern Not Asked     Weight Concern No     Special Diet Yes     Comment: low fats     Back Care Not Asked     Exercise Yes     Comment: walking  miles day, swimming, ellyptical     Bike Helmet Not Asked     Seat Belt Yes     Self-Exams Not Asked   Social History Narrative    , 2 kids, non smoker quit 22 yrs ago. No alcohol social occasional , works for a company to help elderly patient        Review of Systems:  Skin:  Negative       Eyes:  Negative      ENT:  Negative      Respiratory:  Negative       Cardiovascular:  palpitations;chest pain;edema;syncope or near-syncope;exercise intolerance;cyanosis;fatigue;lightheadedness;dizziness;Negative for      Gastroenterology: Negative      Genitourinary:  Negative      Musculoskeletal:  Negative      Neurologic:  Positive for numbness or tingling of hands    Psychiatric:  Negative      Heme/Lymph/Imm:  Negative      Endocrine:  Negative        Physical Exam:  Vitals: /80 (BP Location: Left arm, Patient Position: Sitting, Cuff Size: Adult Regular)   Pulse 91   Ht 1.575 m (5' 2\")   Wt 63 kg (139 lb)   SpO2 99%   BMI 25.42 kg/m      Constitutional:  in no acute distress        Skin:  warm and dry to the touch          Head:  no masses or lesions        Eyes:  pupils equal and round        Lymph:      ENT:           Neck:  carotid pulses are full and equal bilaterally        Respiratory:  clear to auscultation         Cardiac: normal S1 and S2     no presence of murmur          not assessed this visit                                        GI:  not assessed this visit        Extremities and Muscular Skeletal:  no edema              Neurological:  no gross motor deficits        Psych:           CC  Fabiola Mahmood MD  [address] [place] [address]              "

## 2019-09-11 NOTE — LETTER
9/11/2019    Fabiola aMhmood MD  [address]  [place] [address]    RE: Shelley Mccrary       Dear Colleague,    I had the pleasure of seeing Shelley Mccrary in the [facility].    HPI and Plan:    had the pleasure of seeing Shelley Mccrary in Cardiology Clinic in followup.  She is a pleasant 69-year-old female with past medical history of inferolateral myocardial infarction in July 2015 when she was admitted to [facility] and had RCA intervention.  At that time, posterolateral branch with angioplasty was also performed.      She is doing quite well.  She exercises daily - walks 2-4 mils and swims.  She also lifts weights.  She is also following a low cholesterol, low-salt diet religiously.         She is on metoprolol XL at 25 daily.   Previously, her blood pressures are low and therefore, we had to stop her lisinopril.  She remains on aspirin.       Last lipid profile in  12/18 showed an LDL of 82, HDL 61.   She is on atorvastatin.      She denies any chest pain or shortness of breath.      She had an exercise stress nuclear study yesterday that revealed inferolateral scar without ischemia.  She had excess capacity of 12.8 METS without any EKG changes or chest pain.  Ejection fraction was 53%.    PHYSICAL EXAMINATION:   See below      IMPRESSION AND PLAN:   1.  Coronary artery disease, status post inferior wall myocardial infarction in 07/2015.  She is now free of chest pain and shortness of breath.  As noted above, the recent stress nuclear study was negative for ischemia.  She remains on aspirin and metoprolol.     2. Hyperlipidemia  Improved on atorvastatin 40 mg daily and with lifestyle modification.    Her LDL is somewhat higher but she understands importance of lowering saturated fats.  She will get rechecked again with her primary care physician next year and her LDL still high, may need additional dose of atorvastatin to 80 mg.     Overall, she is  doing well.  Since she is doing well, we will see her back in follow-up in 2 years or earlier as needed.    Sincerely,     LAINEY QUISPE MD        cc:   Fabiola Mahmood MD   [address]  [address]     Orders Placed This Encounter   Procedures     Follow-Up with Cardiologist       No orders of the defined types were placed in this encounter.      There are no discontinued medications.      Encounter Diagnoses   Name Primary?     Coronary artery disease involving native coronary artery of native heart without angina pectoris Yes     Hyperlipidemia LDL goal <70        CURRENT MEDICATIONS:  Current Outpatient Medications   Medication Sig Dispense Refill     aspirin 81 MG tablet Take by mouth daily       atorvastatin (LIPITOR) 40 MG tablet Take 1 tablet (40 mg) by mouth daily 90 tablet 3     clobetasol (TEMOVATE) 0.05 % cream   2     metoprolol succinate ER (TOPROL-XL) 25 MG 24 hr tablet Take 1 tablet (25 mg) by mouth daily 90 tablet 3     Multiple Vitamin (MULTI-VITAMINS) TABS Take 1 tablet by mouth       nitroglycerin (NITROSTAT) 0.4 MG SL tablet Place 1 tablet (0.4 mg) under the tongue every 5 minutes as needed for chest pain If you are still having symptoms after 3 doses (15 minutes) call 911. 25 tablet 3     Omega-3 Fatty Acids (OMEGA-3 FISH OIL PO) Take 2 g by mouth daily       sertraline (ZOLOFT) 25 MG tablet Take 1 tablet (25 mg) by mouth daily 90 tablet 1     cyclobenzaprine (FLEXERIL) 5 MG tablet Take 1 tablet (5 mg) by mouth nightly as needed for muscle spasms (Patient not taking: Reported on 9/11/2019) 10 tablet 0     MELATONIN PO Take 1 mg by mouth At Bedtime         ALLERGIES     Allergies   Allergen Reactions     Hmg-Coa-R Inhibitors Other (See Comments)     elevated liver enzymes       PAST MEDICAL HISTORY:  Past Medical History:   Diagnosis Date     Anxiety     post MI     CAD (coronary artery disease) 7/29/2015     Depression     post MI      MRSA infection     hand      Myocardial infarction (H) 2015     Vertigo     related miners        PAST SURGICAL HISTORY:  Past Surgical History:   Procedure Laterality Date     ANGIOPLASTY  2015    MAURISIO to mid circ, thrombectomy -MAURISIO to proximal, mid, distal RCA , successful balloon to 1st RPL done Resolute stents used procedure done at [facility]     [facility]NER EAR SURG[facility]      [facility]        FAMILY HISTORY:  Family History   Problem Relation Age of Onset     Coronary Artery Disease Father         at age 39, and other heart problem      Hyperlipidemia Father         ?      Hypertension Mother      Diabetes No family hx of      Cerebrovascular Disease No family hx of      Breast Cancer No family hx of      Colon Cancer No family hx of        SOCIAL HISTORY:  Social History     Socioeconomic History     Marital status:      Spouse name: None     Number of children: None     Years of education: None     Highest education level: None   Occupational History     None   Social Needs     Financial resource strain: None     Food insecurity:     Worry: None     Inability: None     Transportation needs:     Medical: None     Non-medical: None   Tobacco Use     Smoking status: Former Smoker     Types: Cigarettes     Last attempt to quit: 1990     Years since quittin.7     Smokeless tobacco: Never Used     Tobacco comment: smoked 1-2 pack week, 30 years   Substance and Sexual Activity     Alcohol use: No     Alcohol/week: 0.0 oz     Drug use: No     Sexual activity: Never   Lifestyle     Physical activity:     Days per week: None     Minutes per session: None     Stress: None   Relationships     Social connections:     Talks on phone: None     Gets together: None     Attends Synagogue service: None     Active member of club or organization: None     Attends meetings of clubs or organizations: None     Relationship status: None     Intimate partner violence:     Fear of  "current or ex partner: None     Emotionally abused: None     Physically abused: None     Forced sexual activity: None   Other Topics Concern     Parent/sibling w/ CABG, MI or angioplasty before 65F 55M? Yes      Service Not Asked     Blood Transfusions Not Asked     Caffeine Concern No     Occupational Exposure Not Asked     Hobby Hazards Not Asked     Sleep Concern No     Stress Concern Not Asked     Weight Concern No     Special Diet Yes     Comment: low fats     Back Care Not Asked     Exercise Yes     Comment: walking  miles day, swimming, ellyptical     Bike Helmet Not Asked     Seat Belt Yes     Self-Exams Not Asked   Social History Narrative    , 2 kids, non smoker quit 22 yrs ago. No alcohol social occasional , works for a company to help elderly patient        Review of Systems:  Skin:  Negative       Eyes:  Negative      ENT:  Negative      Respiratory:  Negative       Cardiovascular:  palpitations;chest pain;edema;syncope or near-syncope;exercise intolerance;cyanosis;fatigue;lightheadedness;dizziness;Negative for      Gastroenterology: Negative      Genitourinary:  Negative      Musculoskeletal:  Negative      Neurologic:  Positive for numbness or tingling of hands    Psychiatric:  Negative      Heme/Lymph/Imm:  Negative      Endocrine:  Negative        Physical Exam:  Vitals: /80 (BP Location: Left arm, Patient Position: Sitting, Cuff Size: Adult Regular)   Pulse 91   Ht 1.575 m (5' 2\")   Wt 63 kg (139 lb)   SpO2 99%   BMI 25.42 kg/m       Constitutional:  in no acute distress        Skin:  warm and dry to the touch          Head:  no masses or lesions        Eyes:  pupils equal and round        Lymph:      ENT:           Neck:  carotid pulses are full and equal bilaterally        Respiratory:  clear to auscultation         Cardiac: normal S1 and S2     no presence of murmur          not assessed this visit                                        GI:  not assessed this visit    "     Extremities and Muscular Skeletal:  no edema              Neurological:  no gross motor deficits        Psych:           CC  Fabiola Mahmood MD  [address] [place] [facility] [place] [address]            Thank you for allowing me to participate in the care of your patient.    Sincerely,     Salty Lovett MD     [facility]

## 2019-10-15 ENCOUNTER — OFFICE VISIT (OUTPATIENT)
Dept: FAMILY MEDICINE | Facility: CLINIC | Age: 69
End: 2019-10-15
Payer: MEDICARE

## 2019-10-15 VITALS
OXYGEN SATURATION: 96 % | WEIGHT: 135 LBS | TEMPERATURE: 97.1 F | HEART RATE: 90 BPM | SYSTOLIC BLOOD PRESSURE: 132 MMHG | HEIGHT: 62 IN | BODY MASS INDEX: 24.84 KG/M2 | DIASTOLIC BLOOD PRESSURE: 68 MMHG

## 2019-10-15 DIAGNOSIS — Z23 NEED FOR PROPHYLACTIC VACCINATION AND INOCULATION AGAINST INFLUENZA: ICD-10-CM

## 2019-10-15 DIAGNOSIS — H61.23 BILATERAL IMPACTED CERUMEN: Primary | ICD-10-CM

## 2019-10-15 PROCEDURE — G0008 ADMIN INFLUENZA VIRUS VAC: HCPCS | Performed by: FAMILY MEDICINE

## 2019-10-15 PROCEDURE — 69210 REMOVE IMPACTED EAR WAX UNI: CPT | Performed by: FAMILY MEDICINE

## 2019-10-15 PROCEDURE — 90662 IIV NO PRSV INCREASED AG IM: CPT | Performed by: FAMILY MEDICINE

## 2019-10-15 ASSESSMENT — MIFFLIN-ST. JEOR: SCORE: 1080.61

## 2019-10-15 NOTE — PATIENT INSTRUCTIONS
Patient Education       Earwax, Home Treatment    Everyone produces earwax from the lining of the ear canal. It serves to lubricate and protect the ear. The wax that forms in the canal naturally moves toward the outside of the ear and falls out. Sometimes the ear canal may contain too much wax. This can cause a blockage and loss of hearing. Directions are given below for home treatment.  Home care  If your doctor has advised you to remove a wax blockage yourself, follow these directions:    Unless a medicine was prescribed, you may use an over-the-counter product made for clearing earwax. These contain carbamide peroxide. Lie down with the blocked ear facing upward. Apply one dropper full of medicine and wait a few minutes. Grasp the outer ear and wiggle it to help the solution enter the canal.    Lean over a sink or basin with the blocked ear facing downward. Use a bulb syringe filled with warm (not hot or cold) water to rinse the ear several times. Use gentle pressure only.    If you are having trouble draining the water out of your ear canal, put a few drops of rubbing alcohol (isopropyl alcohol) into the ear canal. This will help remove the remaining water.    Repeat this procedure once a day for up to three days, or until your hearing is back to normal. Do not use this treatment for more than three days in a row.  Don ts    Don t use cold water to rinse the ear. This will make you dizzy.    Don t perform this procedure if you have an ear infection.    Don t perform this procedure if you have a ruptured eardrum.    Don t use cotton swabs, matches, hairpins, keys, or other objects to  clean  the ear canal. This can cause infection of the ear canal or rupture the eardrum. Because of their size and shape, cotton swabs can push earwax deeper into the ear canal instead of removing it.  Follow-up care  Follow up with your health care provider if you are not improving after three cleaning attempts, or as  IDoe Aidan, scribed for Camelia Jacques MD on 06/16/17 at 1829 .





Progress





- Progress Note


Progress Note: 


RN to call patient. Rx for metronidazole e-scribed to pharmacy. Rx #[id number]





The documentation as recorded by the dmitriibDoe martini Aidan accurately reflects 

the service I personally performed and the decisions made by Sawyer nielson Monica, MD. advised.  When to seek medical advice  Call your health care provider right away if any of these occur:    Worsening ear pain    Fever of 101 F (38.3 C) or higher, or as directed by your health care provider    Hearing does not return to normal after three days of treatment    Fluid drainage or bleeding from the ear canal    Swelling, redness, or tenderness of the outer ear    Headache, neck pain, or stiff neck    [id number] [employer]. [address] [place] [address], [place] [id number]. All rights reserved. This information is not intended as a substitute for professional medical care. Always follow your healthcare professional's instructions.

## 2019-10-15 NOTE — PROGRESS NOTES
Subjective     Shelley Mccrary is a 69 year old female who presents to clinic today for the following health issues:    HPI   Concern - Wax in ears ,   Onset: ongoing     Description:    Both ears- irrigated last year to remove wax. Feels like has wax again                 both eras are feeling plugged last several weeks. no pain, no other uri cold sx , feels well otherwise . No ringing dizziness etc.          Patient Active Problem List   Diagnosis     CAD (coronary artery disease)     Hyperlipidemia LDL goal <70     Disturbance of skin sensation     Elevated LFTs     Adjustment disorder with mixed anxiety and depressed mood     Myocardial infarction (H)     Ischemic cardiomyopathy     Palpitations     Acute myocardial infarction of other inferior wall, initial episode of care     Anxiety     Coronary artery disease involving native coronary artery of native heart without angina pectoris     Tinnitus, bilateral     Gastroesophageal reflux disease, esophagitis presence not specified     Cardiomyopathy, unspecified (H)     Past Surgical History:   Procedure Laterality Date     ANGIOPLASTY  2015    MAURISIO to mid circ, thrombectomy -MAURISIO to proximal, mid, distal RCA , successful balloon to 1st RPL done Resolute stents used procedure done [facility]     [facility]      [facility]        Social History     Tobacco Use     Smoking status: Former Smoker     Types: Cigarettes     Last attempt to quit: 1990     Years since quittin.8     Smokeless tobacco: Never Used     Tobacco comment: smoked 1-2 pack week, 30 years   Substance Use Topics     Alcohol use: No     Alcohol/week: 0.0 standard drinks     Family History   Problem Relation Age of Onset     Coronary Artery Disease Father         at age 39, and other heart problem      Hyperlipidemia Father         ?      Hypertension Mother      Diabetes No family hx of      Cerebrovascular Disease No family hx of       Breast Cancer No family hx of      Colon Cancer No family hx of            Reviewed and updated as needed this visit by Provider         Review of Systems   ROS COMP: Constitutional, HEENT, cardiovascular, pulmonary, GI, , musculoskeletal, neuro, skin, endocrine and psych systems are negative, except as otherwise noted.      Objective    There were no vitals taken for this visit.  There is no height or weight on file to calculate BMI.  Physical Exam   GENERAL: healthy, alert and no distress  EYES: Eyes grossly normal to inspection,   HENT: both ears: occluded with wax,  mouth without ulcers or lesions, oropharynx clear and oral mucous membranes moist  NECK: no adenopathy  RESP: lungs clear to auscultation - no rales, rhonchi or wheezes  CV: regular rate and rhythm, normal S1 S2, no S3 or S4,             Assessment & Plan     (H61.23) Bilateral impacted cerumen  (primary encounter diagnosis)  Comment:   Plan: REMOVE IMPACTED CERUMEN          Cerumenosis is noted.  Wax is removed by syringing and manual debridement. Had good results in left ear, although rt was deep wax,  it was difficult to remove she also then  c/o her ear hurting on second attempt, so we decided not to proceed further at this time.   Instructions for home care to help wax buildup are given. Willing to try  using OTC ear wax removal ear drops to see if helps     She will do f/u check  if any ongoing problem or concerns       (Z23) Need for prophylactic vaccination and inoculation against influenza  Comment:   Plan: INFLUENZA (HIGH DOSE) 3 VALENT VACCINE [47319],        Vaccine Administration, Initial [09954]            Return in about 2 months (around 12/15/2019) for Physical Exam.    Fabiola Mahmood MD  [facility] [place]

## 2019-12-09 ENCOUNTER — OFFICE VISIT (OUTPATIENT)
Dept: FAMILY MEDICINE | Facility: CLINIC | Age: 69
End: 2019-12-09
Payer: MEDICARE

## 2019-12-09 VITALS
BODY MASS INDEX: 24.84 KG/M2 | SYSTOLIC BLOOD PRESSURE: 124 MMHG | OXYGEN SATURATION: 98 % | DIASTOLIC BLOOD PRESSURE: 62 MMHG | HEIGHT: 62 IN | TEMPERATURE: 97.4 F | HEART RATE: 74 BPM | WEIGHT: 135 LBS

## 2019-12-09 DIAGNOSIS — H61.21 EXCESSIVE EAR WAX, RIGHT: ICD-10-CM

## 2019-12-09 DIAGNOSIS — I42.8 OTHER CARDIOMYOPATHY (H): ICD-10-CM

## 2019-12-09 DIAGNOSIS — F43.23 ADJUSTMENT DISORDER WITH MIXED ANXIETY AND DEPRESSED MOOD: ICD-10-CM

## 2019-12-09 DIAGNOSIS — Z00.00 ROUTINE HISTORY AND PHYSICAL EXAMINATION OF ADULT: Primary | ICD-10-CM

## 2019-12-09 DIAGNOSIS — Z12.11 COLON CANCER SCREENING: ICD-10-CM

## 2019-12-09 DIAGNOSIS — Z12.31 BREAST CANCER SCREENING BY MAMMOGRAM: ICD-10-CM

## 2019-12-09 DIAGNOSIS — E78.5 HYPERLIPIDEMIA LDL GOAL <70: ICD-10-CM

## 2019-12-09 LAB
ALBUMIN SERPL-MCNC: 3.7 G/DL (ref 3.4–5)
ALP SERPL-CCNC: 79 U/L (ref 40–150)
ALT SERPL W P-5'-P-CCNC: 31 U/L (ref 0–50)
ANION GAP SERPL CALCULATED.3IONS-SCNC: 5 MMOL/L (ref 3–14)
AST SERPL W P-5'-P-CCNC: 29 U/L (ref 0–45)
BILIRUB SERPL-MCNC: 0.5 MG/DL (ref 0.2–1.3)
BUN SERPL-MCNC: 17 MG/DL (ref 7–30)
CALCIUM SERPL-MCNC: 9.4 MG/DL (ref 8.5–10.1)
CHLORIDE SERPL-SCNC: 106 MMOL/L (ref 94–109)
CHOLEST SERPL-MCNC: 151 MG/DL
CO2 SERPL-SCNC: 29 MMOL/L (ref 20–32)
CREAT SERPL-MCNC: 0.79 MG/DL (ref 0.52–1.04)
GFR SERPL CREATININE-BSD FRML MDRD: 75 ML/MIN/{1.73_M2}
GLUCOSE SERPL-MCNC: 92 MG/DL (ref 70–99)
HDLC SERPL-MCNC: 59 MG/DL
LDLC SERPL CALC-MCNC: 80 MG/DL
NONHDLC SERPL-MCNC: 92 MG/DL
POTASSIUM SERPL-SCNC: 4.6 MMOL/L (ref 3.4–5.3)
PROT SERPL-MCNC: 7.3 G/DL (ref 6.8–8.8)
SODIUM SERPL-SCNC: 140 MMOL/L (ref 133–144)
TRIGL SERPL-MCNC: 62 MG/DL

## 2019-12-09 PROCEDURE — 99213 OFFICE O/P EST LOW 20 MIN: CPT | Mod: 25 | Performed by: FAMILY MEDICINE

## 2019-12-09 PROCEDURE — 80061 LIPID PANEL: CPT | Performed by: FAMILY MEDICINE

## 2019-12-09 PROCEDURE — 80053 COMPREHEN METABOLIC PANEL: CPT | Performed by: FAMILY MEDICINE

## 2019-12-09 PROCEDURE — 36415 COLL VENOUS BLD VENIPUNCTURE: CPT | Performed by: FAMILY MEDICINE

## 2019-12-09 PROCEDURE — G0439 PPPS, SUBSEQ VISIT: HCPCS | Performed by: FAMILY MEDICINE

## 2019-12-09 PROCEDURE — 69210 REMOVE IMPACTED EAR WAX UNI: CPT | Mod: RT | Performed by: FAMILY MEDICINE

## 2019-12-09 RX ORDER — METOPROLOL SUCCINATE 25 MG/1
25 TABLET, EXTENDED RELEASE ORAL DAILY
Qty: 90 TABLET | Refills: 1 | Status: SHIPPED | OUTPATIENT
Start: 2019-12-09 | End: 2020-06-15

## 2019-12-09 RX ORDER — ATORVASTATIN CALCIUM 40 MG/1
40 TABLET, FILM COATED ORAL DAILY
Qty: 90 TABLET | Refills: 3 | Status: SHIPPED | OUTPATIENT
Start: 2019-12-09 | End: 2020-12-15

## 2019-12-09 RX ORDER — SERTRALINE HYDROCHLORIDE 25 MG/1
25 TABLET, FILM COATED ORAL DAILY
Qty: 90 TABLET | Refills: 1 | Status: SHIPPED | OUTPATIENT
Start: 2019-12-09 | End: 2020-06-15

## 2019-12-09 ASSESSMENT — ANXIETY QUESTIONNAIRES
2. NOT BEING ABLE TO STOP OR CONTROL WORRYING: NOT AT ALL
7. FEELING AFRAID AS IF SOMETHING AWFUL MIGHT HAPPEN: NOT AT ALL
GAD7 TOTAL SCORE: 0
3. WORRYING TOO MUCH ABOUT DIFFERENT THINGS: NOT AT ALL
IF YOU CHECKED OFF ANY PROBLEMS ON THIS QUESTIONNAIRE, HOW DIFFICULT HAVE THESE PROBLEMS MADE IT FOR YOU TO DO YOUR WORK, TAKE CARE OF THINGS AT HOME, OR GET ALONG WITH OTHER PEOPLE: NOT DIFFICULT AT ALL
6. BECOMING EASILY ANNOYED OR IRRITABLE: NOT AT ALL
1. FEELING NERVOUS, ANXIOUS, OR ON EDGE: NOT AT ALL
5. BEING SO RESTLESS THAT IT IS HARD TO SIT STILL: NOT AT ALL

## 2019-12-09 ASSESSMENT — PATIENT HEALTH QUESTIONNAIRE - PHQ9
5. POOR APPETITE OR OVEREATING: NOT AT ALL
SUM OF ALL RESPONSES TO PHQ QUESTIONS 1-9: 0

## 2019-12-09 ASSESSMENT — MIFFLIN-ST. JEOR: SCORE: 1080.61

## 2019-12-09 ASSESSMENT — ACTIVITIES OF DAILY LIVING (ADL): CURRENT_FUNCTION: NO ASSISTANCE NEEDED

## 2019-12-09 NOTE — PROGRESS NOTES
"SUBJECTIVE:   Shelley Mccrary is a 69 year old female who presents for Preventive Visit.  click delete button to remove this line now  click delete button to remove this line now  Are you in the first 12 months of your Medicare coverage?  No    Healthy Habits:    In general, how would you rate your overall health?  Excellent    Frequency of exercise:  6-7 days/week    Duration of exercise:  15-30 minutes    Do you usually eat at least 4 servings of fruit and vegetables a day, include whole grains    & fiber and avoid regularly eating high fat or \"junk\" foods?  Yes    Taking medications regularly:  Yes    Barriers to taking medications:  None    Medication side effects:  None    Ability to successfully perform activities of daily living:  No assistance needed    Home Safety:  No safety concerns identified    Hearing Impairment:  No hearing concerns    In the past 6 months, have you been bothered by leaking of urine?  No    In general, how would you rate your overall mental or emotional health?  Good      PHQ-2 Total Score:    Additional concerns today:  No      Do you feel safe in your environment? Yes    Have you ever done Advance Care Planning? (For example, a Health Directive, POLST, or a discussion with a medical provider or your loved ones about your wishes): Yes, patient states has an Advance Care Planning document and will bring a copy to the clinic.      Fall risk  Fallen 2 or more times in the past year?: No  Any fall with injury in the past year?: No  click delete button to remove this line now  Cognitive Screening   1) Repeat 3 items (Leader, Season, Table)    2) Clock draw: NORMAL  3) 3 item recall: Recalls 3 objects  Results: 3 items recalled: COGNITIVE IMPAIRMENT LESS LIKELY    Mini-CogTM Bianca Iniguez. Licensed by the author for use in [facility]; reprinted with permission ([email or web address]). All rights reserved.      Do you have sleep apnea, excessive snoring or daytime drowsiness?: " no    Reviewed and updated as needed this visit by clinical staff         Reviewed and updated as needed this visit by Provider        Social History     Tobacco Use     Smoking status: Former Smoker     Types: Cigarettes     Last attempt to quit: 1990     Years since quittin.9     Smokeless tobacco: Never Used     Tobacco comment: smoked 1-2 pack week, 30 years   Substance Use Topics     Alcohol use: No     Alcohol/week: 0.0 standard drinks     If you drink alcohol do you typically have >3 drinks per day or >7 drinks per week? No    Alcohol Use 2017   Prescreen: >3 drinks/day or >7 drinks/week? The patient does not drink >3 drinks per day nor >7 drinks per week.   No flowsheet data found.        PROBLEMS TO ADD ON...    Ear is feeling plugged again last few weeks.  has no other uri sx or any nasal congestion. Has hx of wax problem, so wants ear checked and washed     Denies any ear pain just feels clogged and crackly sometimes. No  significant URI symptoms this time    Hyperlipidemia Follow-Up      Are you regularly taking any medication or supplement to lower your cholesterol?   Yes- lipitor, works well     Are you having muscle aches or other side effects that you think could be caused by your cholesterol lowering medication?  Yes- see [facility]     Hypertension Follow-up      Do you check your blood pressure regularly outside of the clinic? Yes     Are you following a low salt diet? Yes    Are your blood pressures ever more than 140 on the top number (systolic) OR more   than 90 on the bottom number (diastolic), for example 140/90? No      Current providers sharing in care for this patient include:   Patient Care Team:  Fabiola Mahmood MD as PCP - General (Family Practice)  Fabiola Mahmood MD as Assigned PCP    The following health maintenance items are reviewed in Epic and correct as of today:  Health Maintenance   Topic Date Due     ZOSTER IMMUNIZATION (1 of 2) 1998     KALI  ASSESSMENT  2019     PHQ-9  2019     MEDICARE ANNUAL WELLNESS VISIT  2019     LIPID  2019     FALL RISK ASSESSMENT  2019     FIT  2019     CMP  2020     ADVANCE CARE PLANNING  2020     MAMMO SCREENING  2020     DTAP/TDAP/TD IMMUNIZATION (3 - Td) 2028     DEXA  Completed     HEPATITIS C SCREENING  Completed     PHQ-2  Completed     INFLUENZA VACCINE  Completed     PNEUMOCOCCAL IMMUNIZATION 65+ LOW/MEDIUM RISK  Completed     IPV IMMUNIZATION  Aged Out     MENINGITIS IMMUNIZATION  Aged Out     Patient Active Problem List   Diagnosis     CAD (coronary artery disease)     Hyperlipidemia LDL goal <70     Disturbance of skin sensation     Elevated LFTs     Adjustment disorder with mixed anxiety and depressed mood     Myocardial infarction (H)     Ischemic cardiomyopathy     Palpitations     Acute myocardial infarction of other inferior wall, initial episode of care     Anxiety     Coronary artery disease involving native coronary artery of native heart without angina pectoris     Tinnitus, bilateral     Gastroesophageal reflux disease, esophagitis presence not specified     Cardiomyopathy, unspecified (H)     Past Surgical History:   Procedure Laterality Date     ANGIOPLASTY  2015    MARUISIO to mid circ, thrombectomy -MAURISIO to proximal, mid, distal RCA , successful balloon to 1st RPL done Resolute stents used procedure done at [facility]     INNER EAR SURGERY      [facility] diseas        Social History     Tobacco Use     Smoking status: Former Smoker     Types: Cigarettes     Last attempt to quit: 1990     Years since quittin.9     Smokeless tobacco: Never Used     Tobacco comment: smoked 1-2 pack week, 30 years   Substance Use Topics     Alcohol use: No     Alcohol/week: 0.0 standard drinks     Family History   Problem Relation Age of Onset     Coronary Artery Disease Father         at age 39, and other heart problem       "Hyperlipidemia Father         ?      Hypertension Mother      Diabetes No family hx of      Cerebrovascular Disease No family hx of      Breast Cancer No family hx of      Colon Cancer No family hx of          Mammogram Screening: Mammogram Screening: Patient over age 50, mutual decision to screen reflected in health maintenance.    Review of Systems  CONSTITUTIONAL: NEGATIVE for fever, chills, change in weight  INTEGUMENTARY/SKIN: NEGATIVE for worrisome rashes, moles or lesions  EYES: NEGATIVE for vision changes or irritation  ENT/MOUTH: NEGATIVE for ear, mouth and throat problems  RESP: NEGATIVE for significant cough or SOB  BREAST: NEGATIVE for masses, tenderness or discharge  CV: NEGATIVE for chest pain, palpitations or peripheral edema  GI: NEGATIVE for nausea, abdominal pain, heartburn, or change in bowel habits  : NEGATIVE for frequency, dysuria, or hematuria  MUSCULOSKELETAL: NEGATIVE for significant arthralgias or myalgia  NEURO: NEGATIVE for weakness, dizziness or paresthesias  ENDOCRINE: NEGATIVE for temperature intolerance, skin/hair changes  HEME: NEGATIVE for bleeding problems  PSYCHIATRIC: NEGATIVE for changes in mood or affect    OBJECTIVE:   There were no vitals taken for this visit. Estimated body mass index is 24.69 kg/m  as calculated from the following:    Height as of 10/15/19: 1.575 m (5' 2\").    Weight as of 10/15/19: 61.2 kg (135 lb).  Physical Exam  GENERAL: healthy, alert and no distress  EYES: Eyes grossly normal to inspection, PERRL and conjunctivae and sclerae normal  HENT: rt ear canals partially occluded with wax and has very narrow ear canal , left is ok and TM's normal, nose and mouth without ulcers or lesions  NECK: no adenopathy, no asymmetry, masses, or scars and thyroid normal to palpation  RESP: lungs clear to auscultation - no rales, rhonchi or wheezes  BREAST: normal without masses, tenderness or nipple discharge and no palpable axillary masses or adenopathy  CV: regular " rate and rhythm, normal S1 S2, no S3 or S4, no murmur,  no peripheral edema   ABDOMEN: soft, nontender, no hepatosplenomegaly, no masses and bowel sounds normal   (female): deferred  RECTAL: deferred  MS: no gross musculoskeletal defects noted, no edema  SKIN: no suspicious lesions or rashes  NEURO: Normal strength and tone, mentation intact and speech normal  PSYCH: mentation appears normal, affect normal/bright        ASSESSMENT / PLAN:   (Z00.00) Routine history and physical examination of adult  (primary encounter diagnosis)  Comment:   Plan: Comprehensive metabolic panel, Lipid panel         reflex to direct LDL Fasting              (Z12.31) Breast cancer screening by mammogram  Comment:   Plan: *MA Screening Digital Bilateral            (Z12.11) Colon cancer screening  Comment:   Plan: Fecal colorectal cancer screen (FIT)            (E78.5) Hyperlipidemia LDL goal <70  Comment:   Plan: atorvastatin (LIPITOR) 40 MG tablet        Due for labs , need refill on med's.   (I42.8) Other cardiomyopathy (H)  Comment: seeing cardiologist ,   Plan: metoprolol succinate ER (TOPROL-XL) 25 MG 24 hr        tablet            (F43.23) Adjustment disorder with mixed anxiety and depressed mood  Comment:   Plan: sertraline (ZOLOFT) 25 MG tablet          Discussed cares, talked about signs and symptoms of anxiety/ depression and treatment options. Willing to continue with same doze of Zoloft.  Pros/ cons of med's discussed . encouraged to see  to help and referral given . spent sometimes counseling patient. Follow up in 4-6  months, sooner if problem.       (H61.21) Excessive ear wax, right  Comment:   Plan: REMOVE IMPACTED CERUMEN  Cerumenosis is noted.  Wax is removed by syringing and manual debridement. Instructions for home care to prevent wax buildup are given.        Check labs. refill sent.Cares and  treatment discussed follow. up if problem   Patient expressed understanding and agreement with treatment plan. All  "patient's questions were answered, will let me know if has more later.  Medications: Rx's: Reviewed the potential side effects/complications of medications prescribed.       COUNSELING:  Reviewed preventive health counseling, as reflected in patient instructions       Regular exercise       Healthy diet/nutrition       Vision screening       Hearing screening       Dental care       Fall risk prevention       Immunizations    Will consider Vaccinated for: Zoster  Later            Osteoporosis Prevention/Bone Health       Colon cancer screening    Estimated body mass index is 24.69 kg/m  as calculated from the following:    Height as of 10/15/19: 1.575 m (5' 2\").    Weight as of 10/15/19: 61.2 kg (135 lb).         reports that she quit smoking about 29 years ago. Her smoking use included cigarettes. She has never used smokeless tobacco.      Appropriate preventive services were discussed with this patient, including applicable screening as appropriate for cardiovascular disease, diabetes, osteopenia/osteoporosis, and glaucoma.  As appropriate for age/gender, discussed screening for colorectal cancer, prostate cancer, breast cancer, and cervical cancer. Checklist reviewing preventive services available has been given to the patient.    Reviewed patients plan of care and provided an AVS. The Basic Care Plan (routine screening as documented in Health Maintenance) for Shelley meets the Care Plan requirement. This Care Plan has been established and reviewed with the Patient.    Counseling Resources:  ATP IV Guidelines  Pooled Cohorts Equation Calculator  Breast Cancer Risk Calculator  FRAX Risk Assessment  ICSI Preventive Guidelines  Dietary Guidelines for Americans, 2010  USDA's MyPlate  ASA Prophylaxis  Lung CA Screening    Fabiola Mahmood MD  [facility]    "

## 2019-12-09 NOTE — PATIENT INSTRUCTIONS
Patient Education     Prevention Guidelines, Women Ages 65 and Older  Screening tests and vaccines are an important part of managing your health. A screening test is done to find possible disorders or diseases in people who don't have any symptoms. The goal is to find a disease early so lifestyle changes can be made and you can be watched more closely to reduce the risk of disease, or to detect it early enough to treat it most effectively. Screening tests are not considered diagnostic, but are used to determine if more testing is needed. Health counseling is essential, too. Below are guidelines for these, for women ages 65 and older. Talk with your healthcare provider to make sure you re up to date on what you need.  Screening Who needs it How often   Type 2 diabetes or prediabetes All women ages 40 to 75 who are overweight or obese At least every 3 years   Type 2 diabetes All women with prediabetes Every year   Alcohol misuse All women in this age group At routine exams   Blood pressure All women in this age group Yearly checkup if your blood pressure is normal*  Normal blood pressure is less than 120/80 mm Hg*  If your blood pressure reading is higher than normal, follow the advice of your healthcare provider   Breast cancer All women of average risk  There are no guidelines for breast cancer screening for 75 years and older.  Mammograms should be done every 1 or 2 years until age 75. At that point a woman should talk to her doctor about whether to continue screening. Talk to your doctor regarding your recommended frequency depending on your risk factors.   Cervical cancer Only women who had abnormal screening results before age 65 Talk with your healthcare provider   Chlamydia Women at increased risk for infection At routine exams   Colorectal cancer All women over the age of 50  This screening is advised against for women over 75 or if there is a life expectancy of less than 10 years.  Multiple tests are  available and are used at different times. Possible tests include: flexible sigmoidoscopy, colonoscopy, double-contrast barium enema, yearly fecal occult blood test, fecal immunochemical test, or stool DNA test as often as your healthcare provider advises. Talk with your healthcare provider about which tests are best for you.   Depression All women in this age group At routine exams   Gonorrhea Sexually active women at increased risk for infection At routine exams   Hepatitis C Anyone at increased risk; 1 time for those born between 1945 and 1965 At routine exams   High cholesterol or triglycerides All women in this age group who are at risk for coronary artery disease At least every 5 years   HIV Women at increased risk for infection-talk with your healthcare provider At routine exams   Lung cancer Adults ages 55 to 74 who have smoked with a 30-pack-a-year history and have smoked within the past 15 years  Annual low dose CT scan   Obesity All women in this age group At routine exams   Osteoporosis All women in this age group Bone density test at age 65, then follow-up as advised by your healthcare provider   Syphilis Women at increased risk for infection-talk with your healthcare provider At routine exams   Thyroid-Stimulating Hormone (TSH) All women in this age group with symptoms of thyroid dysfunction. There is not enough evidence to support TSH screening in women without symptoms.  ACOG recommendation is every 5 years; American Academy of Family Physicians concludes there is not enough evidence to support routine screening in adults without symptoms.    Tuberculosis Women at increased risk for infection-talk with your healthcare provider Ask your healthcare provider   Vision All women in this age group Every 1 to 2 years; if you have a chronic health condition, ask your healthcare provider if you need exams more often   Vaccine Who needs it How often   Chickenpox (varicella) All women in this age group who have  no record of this infection or vaccine 2 doses; second dose should be given at least 4 weeks after the first dose   Hepatitis A Women at increased risk for infection-talk with your healthcare provider 2 doses given 6 months apart   Hepatitis B Women at increased risk for infection-talk with your healthcare provider 3 doses over 6 months; second dose should be given 1 month after the first dose; the third dose should be given at least 2 months after the second dose and at least 4 months after the first dose   Haemophilus influenza Type B (HIB) Women at increased risk for infection-talk with your healthcare provider 1 to 3 doses   Influenza (flu) All women in this age group Once a year   Pneumococcal conjugate vaccine (PCV13) and pneumococcal polysaccharide vaccine (PPSV23) All women in this age group 1 dose of each vaccine   Tetanus/diphtheria/pertussis (Td/Tdap) booster All women in this age group Td every 10 years, or a one-time dose of Tdap instead of a Td booster after age 18, then Td every 10 years   Zoster All women in this age group 1 dose   Counseling Who needs it How often   Diet and exercise Women who are overweight or obese When diagnosed, and then at routine exams   Fall prevention (exercise and vitamin D supplements) All women in this age group At routine exams   Sexually transmitted infection prevention Women at increased risk for infection-talk with your healthcare provider At routine exams   Use of daily aspirin Talk to your healthcare provider about whether or not to start taking low-dose aspirin for the prevention of cardiovascular disease (CVD) and colorectal cancer in adults ages 60 to 69 who have at least a 10% risk of getting CVD within the next 10 years.  People who are not at increased risk for bleeding, have a life expectancy of at least 10 years, and are willing to take low-dose aspirin daily for at least 10 years are more likely to benefit. When the advantages of taking low-dose aspirin  outweigh the risks, people may choose to start taking a low-dose aspirin.  There is not enough data to support the use of aspirin in people over the age of 70.  When your risk is known   Use of tobacco and the health effects it can cause All women in this age group Every exam   Date Last Reviewed: 11/1/2017 2000-2018 [employer]. [address] [place] [address], [place] [address]. All rights reserved. This information is not intended as a substitute for professional medical care. Always follow your healthcare professional's instructions.

## 2019-12-10 ASSESSMENT — ANXIETY QUESTIONNAIRES: GAD7 TOTAL SCORE: 0

## 2019-12-12 DIAGNOSIS — Z12.11 COLON CANCER SCREENING: ICD-10-CM

## 2019-12-12 PROCEDURE — 82274 ASSAY TEST FOR BLOOD FECAL: CPT | Performed by: FAMILY MEDICINE

## 2019-12-17 ENCOUNTER — ANCILLARY PROCEDURE (OUTPATIENT)
Dept: MAMMOGRAPHY | Facility: CLINIC | Age: 69
End: 2019-12-17
Attending: FAMILY MEDICINE
Payer: MEDICARE

## 2019-12-17 DIAGNOSIS — Z12.31 BREAST CANCER SCREENING BY MAMMOGRAM: ICD-10-CM

## 2019-12-17 PROCEDURE — 77063 BREAST TOMOSYNTHESIS BI: CPT | Mod: TC

## 2019-12-17 PROCEDURE — 77067 SCR MAMMO BI INCL CAD: CPT | Mod: TC

## 2019-12-18 LAB — HEMOCCULT STL QL IA: NEGATIVE

## 2020-06-13 DIAGNOSIS — I42.8 OTHER CARDIOMYOPATHY (H): ICD-10-CM

## 2020-06-13 DIAGNOSIS — F43.23 ADJUSTMENT DISORDER WITH MIXED ANXIETY AND DEPRESSED MOOD: ICD-10-CM

## 2020-06-15 RX ORDER — METOPROLOL SUCCINATE 25 MG/1
TABLET, EXTENDED RELEASE ORAL
Qty: 90 TABLET | Refills: 1 | Status: SHIPPED | OUTPATIENT
Start: 2020-06-15 | End: 2020-12-15

## 2020-06-15 RX ORDER — SERTRALINE HYDROCHLORIDE 25 MG/1
TABLET, FILM COATED ORAL
Qty: 30 TABLET | Refills: 1 | Status: SHIPPED | OUTPATIENT
Start: 2020-06-15 | End: 2020-11-23

## 2020-06-15 NOTE — TELEPHONE ENCOUNTER
Routing refill request to provider for review/approval because:  Labs not current:  phq9  Sent patient a my chart message to complete phq9    ESSENCE Muhammad, RN  [employer] [facility]

## 2020-06-16 NOTE — TELEPHONE ENCOUNTER
Routing to team to inform and assist with scheduling. Thank you very much.     Latoya Altamirano RN, BSN  [facility] [place]

## 2020-06-16 NOTE — TELEPHONE ENCOUNTER
Rhiannont message sent.    .Kathy SILVA    [facility] [place]

## 2020-11-23 ENCOUNTER — OFFICE VISIT (OUTPATIENT)
Dept: FAMILY MEDICINE | Facility: CLINIC | Age: 70
End: 2020-11-23
Payer: MEDICARE

## 2020-11-23 VITALS — SYSTOLIC BLOOD PRESSURE: 122 MMHG | DIASTOLIC BLOOD PRESSURE: 62 MMHG

## 2020-11-23 DIAGNOSIS — L57.0 ACTINIC KERATOSIS: Primary | ICD-10-CM

## 2020-11-23 DIAGNOSIS — L57.8 ACTINIC SKIN DAMAGE: ICD-10-CM

## 2020-11-23 DIAGNOSIS — L82.1 SEBORRHEIC KERATOSIS: ICD-10-CM

## 2020-11-23 DIAGNOSIS — D48.5 NEOPLASM OF UNCERTAIN BEHAVIOR OF SKIN: ICD-10-CM

## 2020-11-23 PROCEDURE — 11102 TANGNTL BX SKIN SINGLE LES: CPT | Mod: 59 | Performed by: FAMILY MEDICINE

## 2020-11-23 PROCEDURE — 88305 TISSUE EXAM BY PATHOLOGIST: CPT | Performed by: PATHOLOGY

## 2020-11-23 PROCEDURE — 11310 SHAVE SKIN LESION 0.5 CM/<: CPT | Performed by: FAMILY MEDICINE

## 2020-11-23 PROCEDURE — 17003 DESTRUCT PREMALG LES 2-14: CPT | Mod: 59 | Performed by: FAMILY MEDICINE

## 2020-11-23 PROCEDURE — 99213 OFFICE O/P EST LOW 20 MIN: CPT | Mod: 25 | Performed by: FAMILY MEDICINE

## 2020-11-23 PROCEDURE — 17000 DESTRUCT PREMALG LESION: CPT | Mod: 59 | Performed by: FAMILY MEDICINE

## 2020-11-23 NOTE — PATIENT INSTRUCTIONS
"  FUTURE APPOINTMENTS  Follow up per pathology report. You will be notified, generally via letter or MyChart, in approximately 10 days. If there is anything we need to discuss or further treatment needed, I will call you to discuss it.      WOUND CARE INSTRUCTIONS  1. Wash hands before every dressing change.  2. Change the dressing after 24 hours and once daily, or earlier if it becomes saturated.  3. Wash the wound area with a mild soap, then rinse.  4. Gently pat dry with a sterile gauze or Q-tip.  5. Using a Q-tip, apply Vaseline or Aquaphor only over entire wound. DO NOT use Neosporin - as many people react to neomycin.  6. Finally, cover with a bandage or sterile non-stick gauze with micropore paper tape.  7. Repeat once daily until wound has healed.      Soap, water and shampoo will not hurt this area.    Do not go swimming or take baths, but showering is encouraged.    Limit use of the area where the procedure was done for a few days to allow for optimal healing.    Signs of Infection:  Infection can occur in any area where skin has been disrupted. If you notice persistent redness, swelling, colored drainage, increasing pain, fever or other signs of infection, please call us at: [phone] and ask to have me or my colleague paged. We will call you back to discuss.    If you experience bleeding:  Wash hands and hold firm pressure on the area for 10 minutes without checking to see if the bleeding has stopped. \"Checking\" pulls off the protective wound clot and restarts the bleeding all over again. Re-apply pressure for 10 minutes if necessary to stop bleeding.  Use additional sterile gauze and tape to maintain pressure once bleeding has stopped.  If bleeding continues, then call back to clinic at [phone].    PATIENT INFORMATION : WOUNDS  During the healing process you will notice a number of changes.     All wounds normally drain.  The larger the wound the more drainage there will be.  After 7-10 " days, you will notice the wound beginning to shrink and new skin will begin to grow.  The wound is healed when you can see that skin has formed over the entire area.  A healed wound has a healthy, shiny look to the surface and is red to dark pink in color to normalize.  Wounds may take approximately 4-6 weeks to heal.  Larger wounds may take 6-8 weeks. After the wound is healed you may discontinue dressing changes.    All wounds develop a small halo of redness surrounding the wound which means that healing is occurring. Severe itching with extensive redness usually indicates sensitivity to the ointment or bandage tape used to dress the wound.  You should call our office if severe itching with extensive redness develops.    Swelling  and/or discoloration around your surgical site is common, particularly when performed around the eye.  You may experience a sensation of tightness as your wound heals. This is normal and will gradually subside.  Your healed wound may be sensitive to temperature changes. This sensitivity improves with time, but if you re having a lot of discomfort, try to avoid temperature extremes.  Patients frequently experience itching after their wound appears to have healed because of the continue healing under the skin.  Plain Vaseline will help relieve the itching.    CRYOTHERAPY POST-TREATMENT CARE INSTRUCTIONS  Liquid nitrogen is mildly uncomfortable when applied to the skin, but the discomfort rapidly subsides.    Post-Treatment:  You may experience burning and/or stinging immediately following the procedure. The discomfort from the procedure may persist over the next 12-24 hours. The area treated will look pinker and slightly swollen before the healing process begins. You may also notice redness, swelling, tenderness, weeping and crusts or scabs. Healing time is approximately 10-14 days.    Blister - You may or may notice blistering from the freezing. If you develop an uncomfortable blister  from today's treatment, you may gently puncture this with a needle that has been cleaned with alcohol. However, do not remove the protective skin layer of the blister.    Scab - After a few days, you may notice scaliness or scab formation. Do not pick at the scabs because this may cause slower healing and a permanent scar.    The skin may appear temporarily darker at the treatment site, but this usually fades over a period of months, provided that the area is protected from the sun.    Care of the areas treated:    Wash the area with a mild cleanser.    Gently pat dry.    Do not rub.     Keep protected from the sun during the healing process and for a full year following treatment as the skin continues to remodel during this time.    If you experience dryness or persistent burning, you may use Vaseline or Aquaphor ointment sparingly.    Do not use Neosporin, as many people eventually develop a medication allergy, that can easily be confused with an infection, to Neomycin.    Return if:  If there is any concern that the lesion has persisted, make an appointment for a re-check. Healing time does vary depending on your individual healing process and the area of the body treated. Most patients will be healed in one month.    Signs of Infection:  Thankfully this is rare. However if you notice persistent colored drainage, increasing pain, fever or other signs of infection, please call back at [phone].    ACTINIC KERATOSES POST-TREATMENT CARE INSTRUCTIONS  Actinic keratoses are benign, scaly or gritty lesions that appear in sun-exposed areas and may progress to skin cancers. For this reason, it is important to treat them before they become cancerous. Liquid nitrogen is the most commonly used and most effective treatment for actinic keratoses; it is mildly uncomfortable when applied to the skin, but the discomfort rapidly subsides.    Post-Treatment:  You may experience burning and/or stinging immediately following  the procedure. The discomfort from the procedure may persist over the next 12-24 hours. The area treated will look pinker and slightly swollen before the healing process begins. You may also notice redness, swelling, tenderness, weeping and crusts or scabs. Healing time is approximately 10-14 days.    Blister - You may or may notice blistering from the freezing. If you develop an uncomfortable blister from today's treatment, you may gently puncture this with a needle that has been cleaned with alcohol. However, do not remove the protective skin layer of the blister.    Scab - After a few days, you may notice scaliness or scab formation. Do not pick at the scabs because this may cause slower healing and a permanent scar.    The skin may appear temporarily darker at the treatment site, but this usually fades over a period of months, provided that the area is protected from the sun.    Care of the areas treated:    Wash the area with a mild cleanser.    Gently pat dry.    Do not rub.     Keep protected from the sun during the healing process and for a full year following treatment as the skin continues to remodel during this time.    Apply Vaseline or Aquaphor ointment sparingly to the site for the first 7 days after treatment.    Do not use Neosporin, as many people eventually develop a medication allergy, that can easily be confused with an infection, to Neomycin.    Return if:  There should not be any residual scaling. If there is any concern that the lesion has persisted after 4-6 weeks, make an appointment for a re-check. Healing time does vary depending on your individual healing process and the area of the body treated. Most patients will be healed in one month.    Signs of Infection:  Thankfully this is rare. However if you notice persistent colored drainage, increasing pain, fever or other signs of infection, please call us at: [phone]

## 2020-11-23 NOTE — PROCEDURES
Name : Shave Excision  Indication : Excision of tissue for pathology evaluation.  Location(s) : bridge of nose  Completed by : La Thomas MD  Photo Taken : yes.  Anesthesia : Patient was anesthetized by infiltrating the area surrounding the lesion with 1% lidocaine.   epinephrine [id number]:216217 : Yes.  Note : Discussed the risk of pain, infection, scarring, hypo- or hyperpigmentation and recurrence or need for re-treatment. The benefits of treatment and alternative treatments were also discussed.    During this procedure, the universal protocol was utilized. The patient's identity was confirmed by no less than two patient identifiers, correct procedure was verified, correct site was verified and marked as applicable and a final pause was completed.    Sterile technique was used throughout the procedure. The skin was cleaned and prepped with surgical cleanser. Once adequate anesthesia was obtained, the lesion was removed with a deep scallop shave procedure. The specimen was sent to pathology.    Direct pressure and aluminum chloride and monopolar cautery was applied for hemostasis. No bleeding was present upon the completion of the procedure. The wound was coated with antibacterial ointment. A dry sterile dressing was applied. Patient tolerated the procedure well and left in satisfactory condition.    Primary provider and referring provider will be informed regarding the tissue report when it returns.      Name : Shave Biopsy  Indication : Biopsy of tissue for pathology evaluation.  Location(s) :Right mid cheek - 20 mm X 5 mm scaly, erythematous indurated lesions ? Squamous cell carcinoma ? Basal cell carcinoma ? Actinic keratosis .  Completed by : La Thomas MD  Photo Taken : yes.  Anesthesia : Patient was anesthetized by infiltrating the area surrounding the lesion with 1% lidocaine.   epinephrine [id number]:[id number] : Yes.  Note : Discussed the risk of pain, infection, scarring, hypo- or hyperpigmentation and recurrence or  need for re-treatment. The benefits of treatment and alternative treatments were also discussed.    During this procedure, the universal protocol was utilized. The patient's identity was confirmed by no less than two patient identifiers, correct procedure was verified, correct site was verified and marked as applicable and a final pause was completed.    Sterile technique was used throughout the procedure. The skin was cleaned and prepped with surgical cleanser. Once adequate anesthesia was obtained, the lesion tangential shave biopsy was obtained.  The specimen was sent to pathology.    Direct pressure and aluminum chloride and monopolar cautery was applied for hemostasis. No bleeding was present upon the completion of the procedure. The wound was coated with antibacterial ointment. A dry sterile dressing was applied. Patient tolerated the procedure well and left in satisfactory condition.    Primary provider and referring provider will be informed regarding the tissue report when it returns.

## 2020-11-23 NOTE — PROGRESS NOTES
[facility] - PRIMARY CARE SKIN    CC: Lesion(s)  SUBJECTIVE:   Shelley Mccrary is a(n) 70 year old female who presents to clinic today because of recurring lesion on the right cheek    Issue One:               The spot previously treated in 2015 is tender but no bleeding , it has slowly increased in size  History of actinic keratosis on the right cheek, treated with cryotherapy in 2017.  Issue two : would like her back evaluated for skin lesions  Personal history of skin cancer : NO - history of actinic keratoses.  Family history of skin cancer : NO.    Sun Exposure History  Previous history of significant sun exposure: YES    Occupation : retired.  Refer to electronic medical record (EMR) for past medical history and medications.      ROS: 14 point review of systems was negative except the symptoms listed above in the HPI.        OBJECTIVE:   GENERAL: healthy, alert and no distress.  HEENT: PERRL. Conjunctiva, sclera clear.  SKIN: Baird Skin Type - I.  Scalp, Face, Neck, Trunk, Arms, Legs, Hands, Feet, Fingers, Toes and Buttocks examined. The dermatoscope was used to help evaluate pigmented lesions.  Skin Pertinent Findings:  Right mid cheek - 20 mm X 5 mm scaly, erythematous indurated lesions ? Squamous cell carcinoma ? Basal cell carcinoma ? Actinic keratosis     Upper bridge of nose - 2 mm hyperkeratotic lesion with indurated base- ? Actinic keratosis ? Squamous cell carcinoma ? Basal cell carcinoma ? Other    Right lateral forehead, right temple, right lower cheek, left lower cheek              Erythematous, scaly, non-indurated lesion(s) most consistent with actinic keratosis  Name: Liquid nitrogen Cry-Ac cryotherapy  Indication: Pre-malignant actinic keratosis  Completed by: La Thomas MD.  Note : Discussed natural history of lesion and treatment options. Prior to treatment, we discussed inflammation, tenderness post-procedure, the healing process, and the risks of pain, infection, scarring,  blistering, and hypo-/hyperpigmentation after healing. Explained that these lesions may grow back and may need additional treatment or re-evaluation. The patient understood and verbally agreed to proceed with cryotherapy.    Each actinic keratosis was treated using liquid nitrogen Cry-Ac with a two five second bursts with a pause to allow for the area to thaw.    The patient tolerated the procedure well and left in good condition. If this lesion should persist or recur, then it needs to be re-evaluated.  Total number of lesions treated: 4    Back - multiple coarse brown textured lesions    ASSESSMENT:     Encounter Diagnoses   Name Primary?     Actinic keratosis Yes     Neoplasm of uncertain behavior of skin      Actinic skin damage      Seborrheic keratosis          PLAN:   Patient Instructions     FUTURE APPOINTMENTS  Follow up per pathology report. You will be notified, generally via letter or MyChart, in approximately 10 days. If there is anything we need to discuss or further treatment needed, I will call you to discuss it.      WOUND CARE INSTRUCTIONS  1. Wash hands before every dressing change.  2. Change the dressing after 24 hours and once daily, or earlier if it becomes saturated.  3. Wash the wound area with a mild soap, then rinse.  4. Gently pat dry with a sterile gauze or Q-tip.  5. Using a Q-tip, apply Vaseline or Aquaphor only over entire wound. DO NOT use Neosporin - as many people react to neomycin.  6. Finally, cover with a bandage or sterile non-stick gauze with micropore paper tape.  7. Repeat once daily until wound has healed.      Soap, water and shampoo will not hurt this area.    Do not go swimming or take baths, but showering is encouraged.    Limit use of the area where the procedure was done for a few days to allow for optimal healing.    Signs of Infection:  Infection can occur in any area where skin has been disrupted. If you notice persistent redness, swelling, colored drainage,  "increasing pain, fever or other signs of infection, please call us at: [phone] and ask to have me or my colleague paged. We will call you back to discuss.    If you experience bleeding:  Wash hands and hold firm pressure on the area for 10 minutes without checking to see if the bleeding has stopped. \"Checking\" pulls off the protective wound clot and restarts the bleeding all over again. Re-apply pressure for 10 minutes if necessary to stop bleeding.  Use additional sterile gauze and tape to maintain pressure once bleeding has stopped.  If bleeding continues, then call back to clinic at [phone].    PATIENT INFORMATION : WOUNDS  During the healing process you will notice a number of changes.     All wounds normally drain.  The larger the wound the more drainage there will be.  After 7-10 days, you will notice the wound beginning to shrink and new skin will begin to grow.  The wound is healed when you can see that skin has formed over the entire area.  A healed wound has a healthy, shiny look to the surface and is red to dark pink in color to normalize.  Wounds may take approximately 4-6 weeks to heal.  Larger wounds may take 6-8 weeks. After the wound is healed you may discontinue dressing changes.    All wounds develop a small halo of redness surrounding the wound which means that healing is occurring. Severe itching with extensive redness usually indicates sensitivity to the ointment or bandage tape used to dress the wound.  You should call our office if severe itching with extensive redness develops.    Swelling  and/or discoloration around your surgical site is common, particularly when performed around the eye.  You may experience a sensation of tightness as your wound heals. This is normal and will gradually subside.  Your healed wound may be sensitive to temperature changes. This sensitivity improves with time, but if you re having a lot of discomfort, try to avoid temperature extremes.  Patients " frequently experience itching after their wound appears to have healed because of the continue healing under the skin.  Plain Vaseline will help relieve the itching.    CRYOTHERAPY POST-TREATMENT CARE INSTRUCTIONS  Liquid nitrogen is mildly uncomfortable when applied to the skin, but the discomfort rapidly subsides.    Post-Treatment:  You may experience burning and/or stinging immediately following the procedure. The discomfort from the procedure may persist over the next 12-24 hours. The area treated will look pinker and slightly swollen before the healing process begins. You may also notice redness, swelling, tenderness, weeping and crusts or scabs. Healing time is approximately 10-14 days.    Blister - You may or may notice blistering from the freezing. If you develop an uncomfortable blister from today's treatment, you may gently puncture this with a needle that has been cleaned with alcohol. However, do not remove the protective skin layer of the blister.    Scab - After a few days, you may notice scaliness or scab formation. Do not pick at the scabs because this may cause slower healing and a permanent scar.    The skin may appear temporarily darker at the treatment site, but this usually fades over a period of months, provided that the area is protected from the sun.    Care of the areas treated:    Wash the area with a mild cleanser.    Gently pat dry.    Do not rub.     Keep protected from the sun during the healing process and for a full year following treatment as the skin continues to remodel during this time.    If you experience dryness or persistent burning, you may use Vaseline or Aquaphor ointment sparingly.    Do not use Neosporin, as many people eventually develop a medication allergy, that can easily be confused with an infection, to Neomycin.    Return if:  If there is any concern that the lesion has persisted, make an appointment for a re-check. Healing time does vary depending on your  individual healing process and the area of the body treated. Most patients will be healed in one month.    Signs of Infection:  Thankfully this is rare. However if you notice persistent colored drainage, increasing pain, fever or other signs of infection, please call back at [phone].    ACTINIC KERATOSES POST-TREATMENT CARE INSTRUCTIONS  Actinic keratoses are benign, scaly or gritty lesions that appear in sun-exposed areas and may progress to skin cancers. For this reason, it is important to treat them before they become cancerous. Liquid nitrogen is the most commonly used and most effective treatment for actinic keratoses; it is mildly uncomfortable when applied to the skin, but the discomfort rapidly subsides.    Post-Treatment:  You may experience burning and/or stinging immediately following the procedure. The discomfort from the procedure may persist over the next 12-24 hours. The area treated will look pinker and slightly swollen before the healing process begins. You may also notice redness, swelling, tenderness, weeping and crusts or scabs. Healing time is approximately 10-14 days.    Blister - You may or may notice blistering from the freezing. If you develop an uncomfortable blister from today's treatment, you may gently puncture this with a needle that has been cleaned with alcohol. However, do not remove the protective skin layer of the blister.    Scab - After a few days, you may notice scaliness or scab formation. Do not pick at the scabs because this may cause slower healing and a permanent scar.    The skin may appear temporarily darker at the treatment site, but this usually fades over a period of months, provided that the area is protected from the sun.    Care of the areas treated:    Wash the area with a mild cleanser.    Gently pat dry.    Do not rub.     Keep protected from the sun during the healing process and for a full year following treatment as the skin continues to remodel during  this time.    Apply Vaseline or Aquaphor ointment sparingly to the site for the first 7 days after treatment.    Do not use Neosporin, as many people eventually develop a medication allergy, that can easily be confused with an infection, to Neomycin.    Return if:  There should not be any residual scaling. If there is any concern that the lesion has persisted after 4-6 weeks, make an appointment for a re-check. Healing time does vary depending on your individual healing process and the area of the body treated. Most patients will be healed in one month.    Signs of Infection:  Thankfully this is rare. However if you notice persistent colored drainage, increasing pain, fever or other signs of infection, please call us at: [phone]        TT: 20 minutes.

## 2020-11-23 NOTE — LETTER
11/23/2020         RE: Shelley Mccrary  [address]  [place] [address]        Dear Colleague,    Thank you for referring your patient, Shelley Mccrary, to the [facility] [place]. Please see a copy of my visit note below.    [facility] - PRIMARY CARE SKIN    CC: Lesion(s)  SUBJECTIVE:   Shelley Mccrary is a(n) 70 year old female who presents to clinic today because of recurring lesion on the right cheek    Issue One:               The spot previously treated in 2015 is tender but no bleeding , it has slowly increased in size  History of actinic keratosis on the right cheek, treated with cryotherapy in 2017.  Issue two : would like her back evaluated for skin lesions  Personal history of skin cancer : NO - history of actinic keratoses.  Family history of skin cancer : NO.    Sun Exposure History  Previous history of significant sun exposure: YES    Occupation : retired.  Refer to electronic medical record (EMR) for past medical history and medications.      ROS: 14 point review of systems was negative except the symptoms listed above in the HPI.        OBJECTIVE:   GENERAL: healthy, alert and no distress.  HEENT: PERRL. Conjunctiva, sclera clear.  SKIN: Baird Skin Type - I.  Scalp, Face, Neck, Trunk, Arms, Legs, Hands, Feet, Fingers, Toes and Buttocks examined. The dermatoscope was used to help evaluate pigmented lesions.  Skin Pertinent Findings:  Right mid cheek - 20 mm X 5 mm scaly, erythematous indurated lesions ? Squamous cell carcinoma ? Basal cell carcinoma ? Actinic keratosis     Upper bridge of nose - 2 mm hyperkeratotic lesion with indurated base- ? Actinic keratosis ? Squamous cell carcinoma ? Basal cell carcinoma ? Other    Right lateral forehead, right temple, right lower cheek, left lower cheek              Erythematous, scaly, non-indurated lesion(s) most consistent with actinic keratosis  Name: Liquid nitrogen Cry-Ac cryotherapy  Indication:  Pre-malignant actinic keratosis  Completed by: La Thomas MD.  Note : Discussed natural history of lesion and treatment options. Prior to treatment, we discussed inflammation, tenderness post-procedure, the healing process, and the risks of pain, infection, scarring, blistering, and hypo-/hyperpigmentation after healing. Explained that these lesions may grow back and may need additional treatment or re-evaluation. The patient understood and verbally agreed to proceed with cryotherapy.    Each actinic keratosis was treated using liquid nitrogen Cry-Ac with a two five second bursts with a pause to allow for the area to thaw.    The patient tolerated the procedure well and left in good condition. If this lesion should persist or recur, then it needs to be re-evaluated.  Total number of lesions treated: 4    Back - multiple coarse brown textured lesions    ASSESSMENT:     Encounter Diagnoses   Name Primary?     Actinic keratosis Yes     Neoplasm of uncertain behavior of skin      Actinic skin damage      Seborrheic keratosis          PLAN:   Patient Instructions     FUTURE APPOINTMENTS  Follow up per pathology report. You will be notified, generally via letter or MyChart, in approximately 10 days. If there is anything we need to discuss or further treatment needed, I will call you to discuss it.      WOUND CARE INSTRUCTIONS  1. Wash hands before every dressing change.  2. Change the dressing after 24 hours and once daily, or earlier if it becomes saturated.  3. Wash the wound area with a mild soap, then rinse.  4. Gently pat dry with a sterile gauze or Q-tip.  5. Using a Q-tip, apply Vaseline or Aquaphor only over entire wound. DO NOT use Neosporin - as many people react to neomycin.  6. Finally, cover with a bandage or sterile non-stick gauze with micropore paper tape.  7. Repeat once daily until wound has healed.      Soap, water and shampoo will not hurt this area.    Do not go swimming or take baths, but  "showering is encouraged.    Limit use of the area where the procedure was done for a few days to allow for optimal healing.    Signs of Infection:  Infection can occur in any area where skin has been disrupted. If you notice persistent redness, swelling, colored drainage, increasing pain, fever or other signs of infection, please call us at: [phone] and ask to have me or my colleague paged. We will call you back to discuss.    If you experience bleeding:  Wash hands and hold firm pressure on the area for 10 minutes without checking to see if the bleeding has stopped. \"Checking\" pulls off the protective wound clot and restarts the bleeding all over again. Re-apply pressure for 10 minutes if necessary to stop bleeding.  Use additional sterile gauze and tape to maintain pressure once bleeding has stopped.  If bleeding continues, then call back to clinic at [phone].    PATIENT INFORMATION : WOUNDS  During the healing process you will notice a number of changes.     All wounds normally drain.  The larger the wound the more drainage there will be.  After 7-10 days, you will notice the wound beginning to shrink and new skin will begin to grow.  The wound is healed when you can see that skin has formed over the entire area.  A healed wound has a healthy, shiny look to the surface and is red to dark pink in color to normalize.  Wounds may take approximately 4-6 weeks to heal.  Larger wounds may take 6-8 weeks. After the wound is healed you may discontinue dressing changes.    All wounds develop a small halo of redness surrounding the wound which means that healing is occurring. Severe itching with extensive redness usually indicates sensitivity to the ointment or bandage tape used to dress the wound.  You should call our office if severe itching with extensive redness develops.    Swelling  and/or discoloration around your surgical site is common, particularly when performed around the eye.  You may experience " a sensation of tightness as your wound heals. This is normal and will gradually subside.  Your healed wound may be sensitive to temperature changes. This sensitivity improves with time, but if you re having a lot of discomfort, try to avoid temperature extremes.  Patients frequently experience itching after their wound appears to have healed because of the continue healing under the skin.  Plain Vaseline will help relieve the itching.    CRYOTHERAPY POST-TREATMENT CARE INSTRUCTIONS  Liquid nitrogen is mildly uncomfortable when applied to the skin, but the discomfort rapidly subsides.    Post-Treatment:  You may experience burning and/or stinging immediately following the procedure. The discomfort from the procedure may persist over the next 12-24 hours. The area treated will look pinker and slightly swollen before the healing process begins. You may also notice redness, swelling, tenderness, weeping and crusts or scabs. Healing time is approximately 10-14 days.    Blister - You may or may notice blistering from the freezing. If you develop an uncomfortable blister from today's treatment, you may gently puncture this with a needle that has been cleaned with alcohol. However, do not remove the protective skin layer of the blister.    Scab - After a few days, you may notice scaliness or scab formation. Do not pick at the scabs because this may cause slower healing and a permanent scar.    The skin may appear temporarily darker at the treatment site, but this usually fades over a period of months, provided that the area is protected from the sun.    Care of the areas treated:    Wash the area with a mild cleanser.    Gently pat dry.    Do not rub.     Keep protected from the sun during the healing process and for a full year following treatment as the skin continues to remodel during this time.    If you experience dryness or persistent burning, you may use Vaseline or Aquaphor ointment sparingly.    Do not use  Neosporin, as many people eventually develop a medication allergy, that can easily be confused with an infection, to Neomycin.    Return if:  If there is any concern that the lesion has persisted, make an appointment for a re-check. Healing time does vary depending on your individual healing process and the area of the body treated. Most patients will be healed in one month.    Signs of Infection:  Thankfully this is rare. However if you notice persistent colored drainage, increasing pain, fever or other signs of infection, please call back at [phone].    ACTINIC KERATOSES POST-TREATMENT CARE INSTRUCTIONS  Actinic keratoses are benign, scaly or gritty lesions that appear in sun-exposed areas and may progress to skin cancers. For this reason, it is important to treat them before they become cancerous. Liquid nitrogen is the most commonly used and most effective treatment for actinic keratoses; it is mildly uncomfortable when applied to the skin, but the discomfort rapidly subsides.    Post-Treatment:  You may experience burning and/or stinging immediately following the procedure. The discomfort from the procedure may persist over the next 12-24 hours. The area treated will look pinker and slightly swollen before the healing process begins. You may also notice redness, swelling, tenderness, weeping and crusts or scabs. Healing time is approximately 10-14 days.    Blister - You may or may notice blistering from the freezing. If you develop an uncomfortable blister from today's treatment, you may gently puncture this with a needle that has been cleaned with alcohol. However, do not remove the protective skin layer of the blister.    Scab - After a few days, you may notice scaliness or scab formation. Do not pick at the scabs because this may cause slower healing and a permanent scar.    The skin may appear temporarily darker at the treatment site, but this usually fades over a period of months, provided that the  area is protected from the sun.    Care of the areas treated:    Wash the area with a mild cleanser.    Gently pat dry.    Do not rub.     Keep protected from the sun during the healing process and for a full year following treatment as the skin continues to remodel during this time.    Apply Vaseline or Aquaphor ointment sparingly to the site for the first 7 days after treatment.    Do not use Neosporin, as many people eventually develop a medication allergy, that can easily be confused with an infection, to Neomycin.    Return if:  There should not be any residual scaling. If there is any concern that the lesion has persisted after 4-6 weeks, make an appointment for a re-check. Healing time does vary depending on your individual healing process and the area of the body treated. Most patients will be healed in one month.    Signs of Infection:  Thankfully this is rare. However if you notice persistent colored drainage, increasing pain, fever or other signs of infection, please call us at: [phone]        TT: 20 minutes.          Again, thank you for allowing me to participate in the care of your patient.        Sincerely,        Modesta Thomas MD

## 2020-11-25 LAB — COPATH REPORT: NORMAL

## 2020-11-28 ENCOUNTER — OFFICE VISIT (OUTPATIENT)
Dept: URGENT CARE | Facility: URGENT CARE | Age: 70
End: 2020-11-28
Payer: MEDICARE

## 2020-11-28 VITALS
TEMPERATURE: 98 F | HEART RATE: 97 BPM | OXYGEN SATURATION: 98 % | SYSTOLIC BLOOD PRESSURE: 170 MMHG | WEIGHT: 141 LBS | DIASTOLIC BLOOD PRESSURE: 95 MMHG | BODY MASS INDEX: 25.79 KG/M2 | RESPIRATION RATE: 20 BRPM

## 2020-11-28 DIAGNOSIS — N36.8 URETHRAL MEATUS PAIN: Primary | ICD-10-CM

## 2020-11-28 DIAGNOSIS — R82.90 NONSPECIFIC FINDING ON EXAMINATION OF URINE: ICD-10-CM

## 2020-11-28 DIAGNOSIS — L90.0 LICHEN SCLEROSUS: ICD-10-CM

## 2020-11-28 DIAGNOSIS — N39.0 URINARY TRACT INFECTION WITHOUT HEMATURIA, SITE UNSPECIFIED: ICD-10-CM

## 2020-11-28 LAB
ALBUMIN UR-MCNC: NEGATIVE MG/DL
APPEARANCE UR: CLEAR
BILIRUB UR QL STRIP: NEGATIVE
COLOR UR AUTO: YELLOW
GLUCOSE UR STRIP-MCNC: NEGATIVE MG/DL
HGB UR QL STRIP: NEGATIVE
KETONES UR STRIP-MCNC: NEGATIVE MG/DL
LEUKOCYTE ESTERASE UR QL STRIP: ABNORMAL
NITRATE UR QL: NEGATIVE
PH UR STRIP: 5.5 PH (ref 5–7)
RBC #/AREA URNS AUTO: ABNORMAL /HPF
SOURCE: ABNORMAL
SP GR UR STRIP: 1.02 (ref 1–1.03)
SPECIMEN SOURCE: NORMAL
UROBILINOGEN UR STRIP-ACNC: 0.2 EU/DL (ref 0.2–1)
WBC #/AREA URNS AUTO: ABNORMAL /HPF
WET PREP SPEC: NORMAL

## 2020-11-28 PROCEDURE — 87210 SMEAR WET MOUNT SALINE/INK: CPT | Performed by: FAMILY MEDICINE

## 2020-11-28 PROCEDURE — 81001 URINALYSIS AUTO W/SCOPE: CPT | Performed by: FAMILY MEDICINE

## 2020-11-28 PROCEDURE — 87086 URINE CULTURE/COLONY COUNT: CPT | Performed by: FAMILY MEDICINE

## 2020-11-28 PROCEDURE — 99214 OFFICE O/P EST MOD 30 MIN: CPT | Performed by: FAMILY MEDICINE

## 2020-11-28 RX ORDER — PHENAZOPYRIDINE HYDROCHLORIDE 100 MG/1
100 TABLET, FILM COATED ORAL 3 TIMES DAILY PRN
Qty: 10 TABLET | Refills: 0 | Status: SHIPPED | OUTPATIENT
Start: 2020-11-28 | End: 2021-05-26

## 2020-11-28 RX ORDER — CLOBETASOL PROPIONATE 0.5 MG/G
OINTMENT TOPICAL 2 TIMES DAILY
Qty: 30 G | Refills: 1 | Status: SHIPPED | OUTPATIENT
Start: 2020-11-28 | End: 2020-12-12

## 2020-11-28 RX ORDER — NITROFURANTOIN 25; 75 MG/1; MG/1
100 CAPSULE ORAL 2 TIMES DAILY
Qty: 14 CAPSULE | Refills: 0 | Status: SHIPPED | OUTPATIENT
Start: 2020-11-28 | End: 2020-12-05

## 2020-11-29 NOTE — PATIENT INSTRUCTIONS
Patient Education     Bladder Infection, Female (Adult)     Urine normally doesn't have any germs (bacteria) in it. But bacteria can get into the urinary tract from the skin around the rectum. Or they can travel in the blood from other parts of the body. Once they are in your urinary tract, they can cause infection in these areas:    The urethra (urethritis)    The bladder (cystitis)    The kidneys (pyelonephritis)  The most common place for an infection is in the bladder. This is called a bladder infection. This is one of the most common infections in women. Most bladder infections are easily treated. They are not serious unless the infection spreads to the kidney.  The terms bladder infection, UTI, and cystitis are often used to describe the same thing. But they are not always the same. Cystitis is an inflammation of the bladder. The most common cause of cystitis is an infection.  Symptoms  The infection causes inflammation in the urethra and bladder. This causes many of the symptoms. The most common symptoms of a bladder infection are:    Pain or burning when urinating    Having to urinate more often than normal    Urgent need to urinate    Only a small amount of urine comes out    Blood in urine    Belly (abdominal) discomfort. This is often in the lower belly above the pubic bone.    Cloudy urine    Strong- or bad-smelling urine    Unable to urinate (urinary retention)    Unable to hold urine in (urinary incontinence)    Fever    Loss of appetite    Confusion (in older adults)  Causes  Bladder infections are not contagious. You can't get one from someone else, from a toilet seat, or from sharing a bath.  The most common cause of bladder infections is bacteria from the bowels. The bacteria get onto the skin around the opening of the urethra. From there, they can get into the urine. Then they travel up to the bladder, causing inflammation and infection. This often happens because of:    Wiping incorrectly after  urinating. Always wipe from front to back.    Bowel incontinence    Pregnancy    Procedures such as having a catheter put in    Older age    Not emptying your bladder. This can give bacteria a chance to grow in your urine.    Fluid loss (dehydration)    Constipation    Having sex    Using a diaphragm for birth control   Treatment  Bladder infections are diagnosed by a urine test and urine culture. They are treated with antibiotics. They often clear up quickly without problems. Treatment helps prevent a more serious kidney infection.  Medicines  Medicines can help in the treatment of a bladder infection:    Take antibiotics until they are used up, even if you feel better. It's important to finish them to make sure the infection has cleared.    You can use acetaminophen or ibuprofen for pain, fever, or discomfort, unless another medicine was prescribed. If you have long-term (chronic) liver or kidney disease, talk with your healthcare provider before using these medicines. Also talk with your provider if you've ever had a stomach ulcer or GI (gastrointestinal) bleeding, or are taking blood-thinner medicines.    If you are given phenazopydridine to reduce burning with urination, it will make your urine a bright orange color. This can stain clothing.  Care and prevention  These self-care steps can help prevent future infections:    Drink plenty of fluids. This helps to prevent dehydration and flush out your bladder. Do this unless you must restrict fluids for other health reasons, or your healthcare provider told you not to.    Clean yourself correctly after going to the bathroom. Wipe from front to back after using the toilet. This helps prevent the spread of bacteria.    Urinate more often. Don't try to hold urine in for a long time.    Wear loose-fitting clothes and cotton underwear. Don't wear tight-fitting pants.    Improve your diet and prevent constipation. Eat more fresh fruits and vegetables, and fiber. Eat  less junk foods and fatty foods.    Don't have sex until your symptoms are gone.    Don't have caffeine, alcohol, and spicy foods. These can irritate your bladder.    Urinate right after you have sex to flush out your bladder.    If you use birth control pills and have frequent bladder infections, discuss it with your healthcare provider.  Follow-up care  Call your healthcare provider if all symptoms are not gone after 3 days of treatment. This is especially important if you have repeat infections.  If a culture was done, you will be told if your treatment needs to be changed. If directed, you can call to find out the results.  If X-rays were done, you will be told if the results will affect your treatment.  Call 911  Call 911 if any of the following occur:    Trouble breathing    Hard to wake up or confusion    Fainting (loss of consciousness)    Fast heart rate  When to get medical advice  Call your healthcare provider right away if any of these occur:    Fever of 100.4 F (38.0 C) or higher, or as directed by your healthcare provider    Symptoms are not better after 3 days of treatment    Back or belly pain that gets worse    Repeated vomiting, or unable to keep medicine down    Weakness or dizziness    Vaginal discharge    Pain, redness, or swelling in the outer vaginal area (labia)  [employer] last reviewed this educational content on 11/1/2019 2000-2020 [employer], [email or web address] [address] [place] [address]. All rights reserved. This information is not intended as a substitute for professional medical care. Always follow your healthcare professional's instructions.           Patient Education     Understanding Lichen Sclerosus   Lichen sclerosus is a long-term (chronic) skin condition. It causes white patches to form on the body. These most often affect skin around the genitals and anus. But they can appear anywhere, even in the mouth. The condition is more common in women who have gone through  menopause and young girls who have not gone through puberty. It also tends to occur in men who are not circumcised. This disorder is not contagious. It is not a sexually transmitted disease.    How to say it  Joselito Chow  What causes lichen sclerosus?   Experts don t yet know exactly what causes lichen sclerosus. It may be an autoimmune disease. That s when the immune system attacks healthy parts of the body, such as the skin. It may also be linked to genetics, hormones, or some infections.   Symptoms of lichen sclerosus   Lichen sclerosus causes white patches on the skin. These patches break down the skin. The skin may become thin, wrinkled, and cracked. It can be itchy and painful. The patches may scar, discolor, and disfigure the skin. These changes can damage the skin. In most cases the patches appear around the vagina and on the penis. Genital lesions can be very itchy or sometimes painful. Skin breakdown or scarring may lead to problems having sex and using the bathroom. In some cases the patches are found on the back, shoulders, neck, wrist, thigh, and breast areas. They may also appear on the lips or in the mouth.   Treatment for lichen sclerosus   Treatment can ease symptoms and prevent scarring. It should be started early to prevent lasting (permanent) damage to the skin. Treatment options include:     Skin care. Bathing with mild soaps and using moisturizing cream may ease itching.    Steroids. These medicines are often put on the skin as an ointment or cream. Very strong, prescription steroid creams are used. Your provider may also inject these into the white patches in severe cases.    Other medicines. An oral medicine (antihistamine) may be given to ease itching. Other creams or ointments are also available if a steroid doesn t work.    Phototherapy. This treatment directs ultraviolet light on the skin to help clear it.    Surgery. This treatment helps with scarring and skin disfigurement. Men  may benefit from circumcision if they haven t yet had it done.  Possible complications of lichen sclerosus      Skin cancer of the genitals    [employer] last reviewed this educational content on 8/1/2019 2000-2020 [employer]. [address] [place] [address], [place] [address]. All rights reserved. This information is not intended as a substitute for professional medical care. Always follow your healthcare professional's instructions.

## 2020-11-30 ENCOUNTER — TELEPHONE (OUTPATIENT)
Dept: FAMILY MEDICINE | Facility: CLINIC | Age: 70
End: 2020-11-30

## 2020-11-30 LAB
BACTERIA SPEC CULT: NO GROWTH
SPECIMEN SOURCE: NORMAL

## 2020-11-30 NOTE — TELEPHONE ENCOUNTER
Patient notified of test results and providers message, patient has no further questions. Appointment scheduled for Cryo    Madai MNEARN BSN  [facility]  [phone]

## 2020-11-30 NOTE — TELEPHONE ENCOUNTER
----- Message from Modesta Thomas MD sent at 11/27/2020 12:48 PM CST -----  Please call ;      Both lesions actinic keratosis , precancerous but benign. See her in 6 week for cryotherapy.    Thank you,   Modesta Thomas M.D.

## 2020-12-05 NOTE — PROGRESS NOTES
SUBJECTIVE:  Shelley Mccrary, a 70 year old female scheduled an appointment to discuss the following issues:     Urethral meatus pain  Nonspecific finding on examination of urine  Urinary tract infection without hematuria, site unspecified  Lichen sclerosus    Medical, social, surgical, and family histories reviewed.     Vaginal Problem (Burning sensation x about 2 days. Pt had some cefalexin 500 (from [place]) and took 1 last night and 1 today. Also took OTC Azo---neither were helping)   Pt complaining of urethral meatus pain X 2 days, last night it woke her up during sleep.  Pt has a hx of lichen sclerosis for a couple of years, controlled with Clobetasol given to her by her gynecologist.  She is not sexually active.  No abdominal or back pain.  No vulvar or vaginal itching or bleeding or abnormal discharge.  Pt has also used Sitz bath with Epson salt but it has not helped.     Pt has had previous UTI;  Has mild dysuria; minimal urinary frequency/urgency.  She also tried A& D ointment and Vaseline.  No known COVID-19 exposure.      ROS:  See HPI.  No nausea/vomiting.  No fever/chills.  No chest pain/SOB.  No BM problems.  No dizziness or syncope.      OBJECTIVE:  BP (!) 170/95   Pulse 97   Temp 98  F (36.7  C) (Temporal)   Resp 20   Wt 64 kg (141 lb)   SpO2 98%   BMI 25.79 kg/m    EXAM:  GENERAL APPEARANCE:  alert and no distress; hypertensive (pt states she has white coat syndrome); afebrile; no cyanosis or accessory muscle use; not septic  EYES: Eyes grossly normal to inspection, PERRL and conjunctivae and sclerae normal  HENT: ear canals and TM's normal and nose and mouth without ulcers or lesions  NECK: no adenopathy, no asymmetry, masses, or scars and thyroid normal to palpation  RESP: lungs clear to auscultation - no rales, rhonchi or wheezes  CV: regular rates and rhythm, normal S1 S2, no S3 or S4 and no murmur, click or rub  LYMPHATICS: no cervical adenopathy  ABDOMEN: soft, nontender, without  hepatosplenomegaly or masses and bowel sounds normal; no CVA tenderness   (female): Some lichen sclerosis lesion noted and superior edge of labia minora bilaterally and at about 6 O'clock of labia minora.  No blisters.  No fissure noted, no bleeding; Vaginal mucosa appears normal; urethral os appears normal  MS: extremities normal- no gross deformities noted  SKIN: see  exam; otherwise no suspicious lesions or rashes  NEURO: Normal strength and tone, mentation intact and speech normal        ASSESSMENT/PLAN:  (N36.8) Urethral meatus pain  (primary encounter diagnosis)  Comment: urinalysis showed significant leukocytosis; wet prep negative  Plan: *UA reflex to Microscopic and Culture ([place]         and [facility] (except [place] and         [place]), Wet prep, Urine Microscopic      (R82.90) Nonspecific finding on examination of urine  Plan: Urine Culture Aerobic Bacterial        (N39.0) Urinary tract infection without hematuria, site unspecified  Plan: nitroFURantoin macrocrystal-monohydrate         (MACROBID) 100 MG capsule, phenazopyridine         (PYRIDIUM) 100 MG tablet       (L90.0) Lichen sclerosus  Comment: use clobetasol after UTI resolved  Plan: clobetasol (TEMOVATE) 0.05 % external ointment      Care instructions given.    Avoid any chemicals at perineum or genital area.  Do not use antibiotics without consulting with health care provider(s).  Pt to f/up PCP within 1 week, sooner if no improvement or worsening.  Warning signs and symptoms explained---be seen ASAP if worsening.

## 2020-12-15 ENCOUNTER — OFFICE VISIT (OUTPATIENT)
Dept: FAMILY MEDICINE | Facility: CLINIC | Age: 70
End: 2020-12-15
Payer: MEDICARE

## 2020-12-15 VITALS
BODY MASS INDEX: 25.68 KG/M2 | HEIGHT: 61 IN | SYSTOLIC BLOOD PRESSURE: 130 MMHG | HEART RATE: 85 BPM | TEMPERATURE: 96.8 F | OXYGEN SATURATION: 98 % | WEIGHT: 136 LBS | DIASTOLIC BLOOD PRESSURE: 78 MMHG

## 2020-12-15 DIAGNOSIS — N89.8 VAGINAL IRRITATION: ICD-10-CM

## 2020-12-15 DIAGNOSIS — Z12.11 COLON CANCER SCREENING: ICD-10-CM

## 2020-12-15 DIAGNOSIS — E78.5 HYPERLIPIDEMIA LDL GOAL <70: ICD-10-CM

## 2020-12-15 DIAGNOSIS — Z00.00 ROUTINE GENERAL MEDICAL EXAMINATION AT A HEALTH CARE FACILITY: Primary | ICD-10-CM

## 2020-12-15 DIAGNOSIS — I42.8 OTHER CARDIOMYOPATHY (H): ICD-10-CM

## 2020-12-15 DIAGNOSIS — F43.23 ADJUSTMENT DISORDER WITH MIXED ANXIETY AND DEPRESSED MOOD: ICD-10-CM

## 2020-12-15 DIAGNOSIS — I25.5 ISCHEMIC CARDIOMYOPATHY: ICD-10-CM

## 2020-12-15 LAB
ERYTHROCYTE [DISTWIDTH] IN BLOOD BY AUTOMATED COUNT: 13.2 % (ref 10–15)
HCT VFR BLD AUTO: 42.5 % (ref 35–47)
HGB BLD-MCNC: 14.2 G/DL (ref 11.7–15.7)
MCH RBC QN AUTO: 30.7 PG (ref 26.5–33)
MCHC RBC AUTO-ENTMCNC: 33.4 G/DL (ref 31.5–36.5)
MCV RBC AUTO: 92 FL (ref 78–100)
PLATELET # BLD AUTO: 239 10E9/L (ref 150–450)
RBC # BLD AUTO: 4.63 10E12/L (ref 3.8–5.2)
WBC # BLD AUTO: 6.7 10E9/L (ref 4–11)

## 2020-12-15 PROCEDURE — G0439 PPPS, SUBSEQ VISIT: HCPCS | Performed by: FAMILY MEDICINE

## 2020-12-15 PROCEDURE — 85027 COMPLETE CBC AUTOMATED: CPT | Performed by: FAMILY MEDICINE

## 2020-12-15 PROCEDURE — 36415 COLL VENOUS BLD VENIPUNCTURE: CPT | Performed by: FAMILY MEDICINE

## 2020-12-15 PROCEDURE — 80061 LIPID PANEL: CPT | Performed by: FAMILY MEDICINE

## 2020-12-15 PROCEDURE — 99214 OFFICE O/P EST MOD 30 MIN: CPT | Mod: 25 | Performed by: FAMILY MEDICINE

## 2020-12-15 PROCEDURE — 80053 COMPREHEN METABOLIC PANEL: CPT | Performed by: FAMILY MEDICINE

## 2020-12-15 PROCEDURE — 82274 ASSAY TEST FOR BLOOD FECAL: CPT | Performed by: FAMILY MEDICINE

## 2020-12-15 RX ORDER — SERTRALINE HYDROCHLORIDE 25 MG/1
TABLET, FILM COATED ORAL
COMMUNITY
Start: 2020-03-16 | End: 2020-12-15

## 2020-12-15 RX ORDER — ATORVASTATIN CALCIUM 40 MG/1
40 TABLET, FILM COATED ORAL DAILY
Qty: 90 TABLET | Refills: 3 | Status: SHIPPED | OUTPATIENT
Start: 2020-12-15 | End: 2021-12-16

## 2020-12-15 RX ORDER — SERTRALINE HYDROCHLORIDE 25 MG/1
25 TABLET, FILM COATED ORAL DAILY
Qty: 90 TABLET | Refills: 0 | Status: SHIPPED | OUTPATIENT
Start: 2020-12-15 | End: 2021-03-25

## 2020-12-15 RX ORDER — METOPROLOL SUCCINATE 25 MG/1
25 TABLET, EXTENDED RELEASE ORAL DAILY
Qty: 90 TABLET | Refills: 1 | Status: SHIPPED | OUTPATIENT
Start: 2020-12-15 | End: 2021-07-02

## 2020-12-15 ASSESSMENT — ACTIVITIES OF DAILY LIVING (ADL): CURRENT_FUNCTION: NO ASSISTANCE NEEDED

## 2020-12-15 ASSESSMENT — ANXIETY QUESTIONNAIRES
2. NOT BEING ABLE TO STOP OR CONTROL WORRYING: MORE THAN HALF THE DAYS
GAD7 TOTAL SCORE: 13
6. BECOMING EASILY ANNOYED OR IRRITABLE: MORE THAN HALF THE DAYS
IF YOU CHECKED OFF ANY PROBLEMS ON THIS QUESTIONNAIRE, HOW DIFFICULT HAVE THESE PROBLEMS MADE IT FOR YOU TO DO YOUR WORK, TAKE CARE OF THINGS AT HOME, OR GET ALONG WITH OTHER PEOPLE: SOMEWHAT DIFFICULT
1. FEELING NERVOUS, ANXIOUS, OR ON EDGE: MORE THAN HALF THE DAYS
7. FEELING AFRAID AS IF SOMETHING AWFUL MIGHT HAPPEN: MORE THAN HALF THE DAYS
3. WORRYING TOO MUCH ABOUT DIFFERENT THINGS: MORE THAN HALF THE DAYS
5. BEING SO RESTLESS THAT IT IS HARD TO SIT STILL: SEVERAL DAYS

## 2020-12-15 ASSESSMENT — MIFFLIN-ST. JEOR: SCORE: 1064.27

## 2020-12-15 ASSESSMENT — PATIENT HEALTH QUESTIONNAIRE - PHQ9
5. POOR APPETITE OR OVEREATING: MORE THAN HALF THE DAYS
SUM OF ALL RESPONSES TO PHQ QUESTIONS 1-9: 3

## 2020-12-15 NOTE — PROGRESS NOTES
"SUBJECTIVE:   Shelley Mccrary is a 70 year old female who presents for Preventive Visit.      Patient has been advised of split billing requirements and indicates understanding: Yes   Are you in the first 12 months of your Medicare coverage?  No    Healthy Habits:    In general, how would you rate your overall health?  Excellent    Frequency of exercise:  6-7 days/week    Duration of exercise:  Greater than 60 minutes    Do you usually eat at least 4 servings of fruit and vegetables a day, include whole grains    & fiber and avoid regularly eating high fat or \"junk\" foods?  No    Taking medications regularly:  No    Barriers to taking medications:  None    Medication side effects:  None    Ability to successfully perform activities of daily living:  No assistance needed    Home Safety:  No safety concerns identified    Hearing Impairment:  Difficulty understanding soft or whispered speech and need to ask people to speak up or repeat themselves    In the past 6 months, have you been bothered by leaking of urine?  No    In general, how would you rate your overall mental or emotional health?  Good      PHQ-2 Total Score:    Additional concerns today:  Yes    Do you feel safe in your environment? Yes    Have you ever done Advance Care Planning? (For example, a Health Directive, POLST, or a discussion with a medical provider or your loved ones about your wishes): Yes, patient states has an Advance Care Planning document and will bring a copy to the clinic.      Fall risk  Fallen 2 or more times in the past year?: No  Any fall with injury in the past year?: No    Cognitive Screening   1) Repeat 3 items (Leader, Season, Table)    2) Clock draw: NORMAL  3) 3 item recall: Recalls 3 objects  Results: 3 items recalled: COGNITIVE IMPAIRMENT LESS LIKELY    Mini-CogTM Copyright KAVON Iniguez. Licensed by the author for use in [employer]; reprinted with permission (tonio@.[facility]). All rights reserved.      Do you have " "sleep apnea, excessive snoring or daytime drowsiness?: no    Reviewed and updated as needed this visit by clinical staff  Tobacco  Allergies  Meds      Soc Hx        Reviewed and updated as needed this visit by Provider                Social History     Tobacco Use     Smoking status: Former Smoker     Types: Cigarettes     Quit date: 1990     Years since quittin.9     Smokeless tobacco: Never Used     Tobacco comment: smoked 1-2 pack week, 30 years   Substance Use Topics     Alcohol use: No     Alcohol/week: 0.0 standard drinks     If you drink alcohol do you typically have >3 drinks per day or >7 drinks per week? No      ADD ON PROBLEM     Also admits that she has been  more anxious lately . Sleep is ok ,not much fatigue , but just nervous .   She had stopped taking Zoloft but has started taking again last 3 days and wants to get refill again.   She thinks  covid and now also winter,  is making her feel more depressed and down now.      She has seen cardiology recently and her heart is doing ok. She had her  labs recently as well and and could use refill on med's     Follow up vaginal sx         Duration: 2 weeks ago     Description (location/character/radiation): pain when urinating , although had wet prep and ua doen by her gyn and everything was ok. . She also has hx lichen sclerosis and she was told that could also be causing problem with irritation in  vaginal area,  so she has been given   topical cream previously by derm but she has not started using as much lately,  but concerned with her ongoing sx wondering would se should do. She also admits that it \"could also be just her nerves\"     Intensity:  moderate    Accompanying signs and symptoms: pain , irritation mostly     History (similar episodes/previous evaluation): yes    Precipitating or alleviating factors: None    Therapies tried and outcome: as above          Current providers sharing in care for this patient include:   Patient Care " Team:  Fabiola Mahmood MD as PCP - General (Family Practice)  Fabiola Mahmood MD as Assigned PCP  Salty Lovett MD as Assigned Heart and Vascular Provider    The following health maintenance items are reviewed in Epic and correct as of today:  Health Maintenance   Topic Date Due     ZOSTER IMMUNIZATION (1 of 2) 04/01/1998     KALI ASSESSMENT  06/09/2020     FALL RISK ASSESSMENT  12/09/2020     COLORECTAL CANCER SCREENING  12/12/2020     MEDICARE ANNUAL WELLNESS VISIT  12/09/2020     PHQ-9  12/15/2020     CMP  12/15/2021     LIPID  12/15/2021     MAMMO SCREENING  12/17/2021     ADVANCE CARE PLANNING  12/09/2024     DTAP/TDAP/TD IMMUNIZATION (3 - Td) 12/11/2028     DEXA  11/16/2031     HEPATITIS C SCREENING  Completed     PHQ-2  Completed     INFLUENZA VACCINE  Completed     Pneumococcal Vaccine: 65+ Years  Completed     Pneumococcal Vaccine: Pediatrics (0 to 5 Years) and At-Risk Patients (6 to 64 Years)  Aged Out     IPV IMMUNIZATION  Aged Out     MENINGITIS IMMUNIZATION  Aged Out     HEPATITIS B IMMUNIZATION  Aged Out     Patient Active Problem List   Diagnosis     CAD (coronary artery disease)     Hyperlipidemia LDL goal <70     Disturbance of skin sensation     Elevated LFTs     Adjustment disorder with mixed anxiety and depressed mood     Myocardial infarction (H)     Ischemic cardiomyopathy     Palpitations     Acute myocardial infarction of other inferior wall, initial episode of care     Anxiety     Coronary artery disease involving native coronary artery of native heart without angina pectoris     Tinnitus, bilateral     Gastroesophageal reflux disease, esophagitis presence not specified     Cardiomyopathy, unspecified (H)     Other screening mammogram     Past Surgical History:   Procedure Laterality Date     ANGIOPLASTY  7/17/2015    MAURISIO to mid circ, thrombectomy -MAURISIO to proximal, mid, distal RCA , successful balloon to 1st RPL done Resolute stents used procedure done at [facility]      INNER EAR SURGERY      redesign roxann nieto bc of miners diseas        Social History     Tobacco Use     Smoking status: Former Smoker     Types: Cigarettes     Quit date: 1990     Years since quittin.9     Smokeless tobacco: Never Used     Tobacco comment: smoked 1-2 pack week, 30 years   Substance Use Topics     Alcohol use: No     Alcohol/week: 0.0 standard drinks     Family History   Problem Relation Age of Onset     Coronary Artery Disease Father         at age 39, and other heart problem      Hyperlipidemia Father         ?      Hypertension Mother      Diabetes No family hx of      Cerebrovascular Disease No family hx of      Breast Cancer No family hx of      Colon Cancer No family hx of          Pneumonia Vaccine:Adults age 65+ who received Pneumovax (PPSV23) at 65 years or older: Should be given PCV13 > 1 year after their most recent PPSV23  Mammogram Screening: Mammogram Screening: Patient over age 50, mutual decision to screen reflected in health maintenance.    Review of Systems  CONSTITUTIONAL: NEGATIVE for fever, chills, change in weight  INTEGUMENTARY/SKIN: NEGATIVE for worrisome rashes, moles or lesions  EYES: NEGATIVE for vision changes or irritation  ENT/MOUTH: NEGATIVE for ear, mouth and throat problems  RESP: NEGATIVE for significant cough or SOB  BREAST: NEGATIVE for masses, tenderness or discharge  CV: NEGATIVE for chest pain, palpitations or peripheral edema  GI: NEGATIVE for nausea, abdominal pain, heartburn, or change in bowel habits  : NEGATIVE for frequency, dysuria, or hematuria   female: except ongoing vaginal irritation   MUSCULOSKELETAL: NEGATIVE for significant arthralgias or myalgia  NEURO: NEGATIVE for weakness, dizziness or paresthesias  ENDOCRINE: NEGATIVE for temperature intolerance, skin/hair changes  HEME: NEGATIVE for bleeding problems  PSYCHIATRIC: NEGATIVE for changes in mood or affect    OBJECTIVE:   There were no vitals taken for this visit. Estimated  "body mass index is 25.79 kg/m  as calculated from the following:    Height as of 12/9/19: 1.575 m (5' 2\").    Weight as of 11/28/20: 64 kg (141 lb).  Physical Exam  GENERAL APPEARANCE: healthy, alert and no distress  EYES: Eyes grossly normal to inspection,  conjunctivae and sclerae normal  HENT: ear canals and TM's normal, nose and mouth without ulcers or lesions, oropharynx clear and oral mucous membranes moist  NECK: no adenopathy, no asymmetry, masses, or scars and thyroid normal to palpation  RESP: lungs clear to auscultation - no rales, rhonchi or wheezes  BREAST: normal without masses, tenderness or nipple discharge and no palpable axillary masses or adenopathy  CV: regular rate and rhythm, normal S1 S2, no S3 or S4, no murmur, click or rub, no peripheral edema   ABDOMEN: soft, nontender, no hepatosplenomegaly, no masses and bowel sounds normal  - pt declined   MS: no ankle edema   SKIN: no suspicious lesions or rashes  NEURO: Normal strength and tone, sensory exam grossly normal, mentation intact and speech normal  PSYCH: mentation appears normal, judgement and insight intact, slightly anxious, worried and speech pressured        ASSESSMENT / PLAN:   (Z00.00) Routine general medical examination at a health care facility  (primary encounter diagnosis)  Comment:   Plan:     (E78.5) Hyperlipidemia LDL goal <70  Comment:   Plan: atorvastatin (LIPITOR) 40 MG tablet              (F43.23) Adjustment disorder with mixed anxiety and depressed mood  Comment:   Plan: sertraline (ZOLOFT) 25 MG tablet          Discussed cares, talked about signs and symptoms of anxiety/ depression and treatment options. Willing to go back on Zoloft. Pros/ cons of med's discussed . encouraged to see  to help and referral given . spent sometimes counseling patient. Follow up in 2-3 months, sooner if problem.     (N89.8) Vaginal irritation  Comment:  Plan: discussed cares concerns and possible causes of her sx. Had recent UA and " "vaginal swab to r/o infection. She has know hx of lichen sclerosis in  area , so she is scheduled to be seen at skin care clinic to further evaluate. So reassurance and observation for now. Cares and symptomatic treatment discussed. She will do  follow up if problem       (I42.8) Other cardiomyopathy (H)  Comment: stable , seeing cardiology   Plan: metoprolol succinate ER (TOPROL-XL) 25 MG 24 hr        tablet            (Z12.11) Colon cancer screening  Comment:   Plan: GASTROENTEROLOGY ADULT REF PROCEDURE ONLY,         Fecal colorectal cancer screen (FIT)            Check labs.script  Sent. Referral placed. Cares and  treatment discussed follow. up if problem   Patient expressed understanding and agreement with treatment plan. All patient's questions were answered, will let me know if has more later.  Medications: Rx's: Reviewed the potential side effects/complications of medications prescribed.     COUNSELING:  Reviewed preventive health counseling, as reflected in patient instructions       Regular exercise       Healthy diet/nutrition       Vision screening       Dental care       Fall risk prevention       Immunizations    Declined: Zoster due to Other cost, will consider it later                Osteoporosis Prevention/Bone Health    Estimated body mass index is 25.79 kg/m  as calculated from the following:    Height as of 12/9/19: 1.575 m (5' 2\").    Weight as of 11/28/20: 64 kg (141 lb).        She reports that she quit smoking about 30 years ago. Her smoking use included cigarettes. She has never used smokeless tobacco.      Appropriate preventive services were discussed with this patient, including applicable screening as appropriate for cardiovascular disease, diabetes, osteopenia/osteoporosis, and glaucoma.  As appropriate for age/gender, discussed screening for colorectal cancer, prostate cancer, breast cancer, and cervical cancer. Checklist reviewing preventive services available has been given to the " patient.    Reviewed patients plan of care and provided an AVS. The Basic Care Plan (routine screening as documented in Health Maintenance) for Shelley meets the Care Plan requirement. This Care Plan has been established and reviewed with the Patient.    Counseling Resources:  ATP IV Guidelines  Pooled Cohorts Equation Calculator  Breast Cancer Risk Calculator  Breast Cancer: Medication to Reduce Risk  FRAX Risk Assessment  ICSI Preventive Guidelines  Dietary Guidelines for Americans, 2010  USDA's MyPlate  ASA Prophylaxis  Lung CA Screening    aFbiola Mahmood MD  [facility]

## 2020-12-15 NOTE — PATIENT INSTRUCTIONS
Patient Education     Prevention Guidelines, Women Ages 65 and Older  Screening tests and vaccines are an important part of managing your health. A screening test is done to find possible disorders or diseases in people who don't have any symptoms. The goal is to find a disease early so lifestyle changes can be made and you can be watched more closely to reduce the risk of disease, or to detect it early enough to treat it most effectively. Screening tests are not considered diagnostic, but are used to determine if more testing is needed. Health counseling is essential, too. Below are guidelines for these, for women ages 65 and older. Talk with your healthcare provider to make sure you re up to date on what you need.  Screening Who needs it How often   Type 2 diabetes or prediabetes All women ages 40 to 75 who are overweight or obese At least every 3 years   Type 2 diabetes All women with prediabetes Every year   Alcohol misuse All women in this age group At routine exams   Blood pressure All women in this age group Yearly checkup if your blood pressure is normal*  Normal blood pressure is less than 120/80 mm Hg*  If your blood pressure reading is higher than normal, follow the advice of your healthcare provider   Breast cancer All women of average risk  There are no guidelines for breast cancer screening for 75 years and older.  Mammograms should be done every 1 or 2 years until age 75. At that point a woman should talk to her doctor about whether to continue screening. Talk to your doctor regarding your recommended frequency depending on your risk factors.   Cervical cancer Only women who had abnormal screening results before age 65 Talk with your healthcare provider   Chlamydia Women at increased risk for infection At routine exams   Colorectal cancer All women over the age of 50  This screening is advised against for women over 75 or if there is a life expectancy of less than 10 years.  Multiple tests are  available and are used at different times. Possible tests include: flexible sigmoidoscopy, colonoscopy, double-contrast barium enema, yearly fecal occult blood test, fecal immunochemical test, or stool DNA test as often as your healthcare provider advises. Talk with your healthcare provider about which tests are best for you.   Depression All women in this age group At routine exams   Gonorrhea Sexually active women at increased risk for infection At routine exams   Hepatitis C Anyone at increased risk; 1 time for those born between 1945 and 1965 At routine exams   High cholesterol or triglycerides All women in this age group who are at risk for coronary artery disease At least every 5 years   HIV Women at increased risk for infection-talk with your healthcare provider At routine exams   Lung cancer Adults ages 55 to 74 who have smoked with a 30-pack-a-year history and have smoked within the past 15 years  Annual low dose CT scan   Obesity All women in this age group At routine exams   Osteoporosis All women in this age group Bone density test at age 65, then follow-up as advised by your healthcare provider   Syphilis Women at increased risk for infection-talk with your healthcare provider At routine exams   Thyroid-Stimulating Hormone (TSH) All women in this age group with symptoms of thyroid dysfunction. There is not enough evidence to support TSH screening in women without symptoms.  ACOG recommendation is every 5 years; American Academy of Family Physicians concludes there is not enough evidence to support routine screening in adults without symptoms.    Tuberculosis Women at increased risk for infection-talk with your healthcare provider Ask your healthcare provider   Vision All women in this age group Every 1 to 2 years; if you have a chronic health condition, ask your healthcare provider if you need exams more often   Vaccine Who needs it How often   Chickenpox (varicella) All women in this age group who have  no record of this infection or vaccine 2 doses; second dose should be given at least 4 weeks after the first dose   Hepatitis A Women at increased risk for infection-talk with your healthcare provider 2 doses given 6 months apart   Hepatitis B Women at increased risk for infection-talk with your healthcare provider 3 doses over 6 months; second dose should be given 1 month after the first dose; the third dose should be given at least 2 months after the second dose and at least 4 months after the first dose   Haemophilus influenza Type B (HIB) Women at increased risk for infection-talk with your healthcare provider 1 to 3 doses   Influenza (flu) All women in this age group Once a year   Pneumococcal conjugate vaccine (PCV13) and pneumococcal polysaccharide vaccine (PPSV23) All women in this age group 1 dose of each vaccine   Tetanus/diphtheria/pertussis (Td/Tdap) booster All women in this age group Td every 10 years, or a one-time dose of Tdap instead of a Td booster after age 18, then Td every 10 years   Zoster All women in this age group 1 dose   Counseling Who needs it How often   Diet and exercise Women who are overweight or obese When diagnosed, and then at routine exams   Fall prevention (exercise and vitamin D supplements) All women in this age group At routine exams   Sexually transmitted infection prevention Women at increased risk for infection-talk with your healthcare provider At routine exams   Use of daily aspirin Talk to your healthcare provider about whether or not to start taking low-dose aspirin for the prevention of cardiovascular disease (CVD) and colorectal cancer in adults ages 60 to 69 who have at least a 10% risk of getting CVD within the next 10 years.  People who are not at increased risk for bleeding, have a life expectancy of at least 10 years, and are willing to take low-dose aspirin daily for at least 10 years are more likely to benefit. When the advantages of taking low-dose aspirin  outweigh the risks, people may choose to start taking a low-dose aspirin.  There is not enough data to support the use of aspirin in people over the age of 70.  When your risk is known   Use of tobacco and the health effects it can cause All women in this age group Every exam   Lorenzo last reviewed this educational content on 11/1/2017 2000-2020 [employer]. [address] [place] [address], [place] [address]. All rights reserved. This information is not intended as a substitute for professional medical care. Always follow your healthcare professional's instructions.

## 2020-12-16 LAB
ALBUMIN SERPL-MCNC: 3.9 G/DL (ref 3.4–5)
ALP SERPL-CCNC: 79 U/L (ref 40–150)
ALT SERPL W P-5'-P-CCNC: 38 U/L (ref 0–50)
ANION GAP SERPL CALCULATED.3IONS-SCNC: 5 MMOL/L (ref 3–14)
AST SERPL W P-5'-P-CCNC: 33 U/L (ref 0–45)
BILIRUB SERPL-MCNC: 0.7 MG/DL (ref 0.2–1.3)
BUN SERPL-MCNC: 16 MG/DL (ref 7–30)
CALCIUM SERPL-MCNC: 9.3 MG/DL (ref 8.5–10.1)
CHLORIDE SERPL-SCNC: 107 MMOL/L (ref 94–109)
CHOLEST SERPL-MCNC: 154 MG/DL
CO2 SERPL-SCNC: 26 MMOL/L (ref 20–32)
CREAT SERPL-MCNC: 0.81 MG/DL (ref 0.52–1.04)
GFR SERPL CREATININE-BSD FRML MDRD: 72 ML/MIN/{1.73_M2}
GLUCOSE SERPL-MCNC: 99 MG/DL (ref 70–99)
HDLC SERPL-MCNC: 67 MG/DL
LDLC SERPL CALC-MCNC: 77 MG/DL
NONHDLC SERPL-MCNC: 87 MG/DL
POTASSIUM SERPL-SCNC: 4.4 MMOL/L (ref 3.4–5.3)
PROT SERPL-MCNC: 7.5 G/DL (ref 6.8–8.8)
SODIUM SERPL-SCNC: 138 MMOL/L (ref 133–144)
TRIGL SERPL-MCNC: 52 MG/DL

## 2020-12-16 ASSESSMENT — ANXIETY QUESTIONNAIRES: GAD7 TOTAL SCORE: 13

## 2020-12-18 LAB — HEMOCCULT STL QL IA: NEGATIVE

## 2020-12-19 ENCOUNTER — MYC MEDICAL ADVICE (OUTPATIENT)
Dept: FAMILY MEDICINE | Facility: CLINIC | Age: 70
End: 2020-12-19

## 2020-12-19 DIAGNOSIS — R30.0 DYSURIA: Primary | ICD-10-CM

## 2020-12-20 NOTE — PROGRESS NOTES
[facility] - PRIMARY CARE SKIN    CC: Lesion(s)  SUBJECTIVE:   Shelley Mccrary is a(n) 70 year old female who presents to clinic today for treatment of biopsy proven actinic keratosis on the right mid cheek and right upper bridge of nose.  Issue One:               The spot previously treated in 2015 is tender but no bleeding , it has slowly increased in size  History of actinic keratosis on the right cheek, treated with cryotherapy in 2017.    Issue two : history of lichen sclerosis et atrophicus. She has felt two bumps down by the vulva and would like those evaluated. . Has been using clobetasol proprinate 0.05% ointment - used for 3 weeks.  No bleeding.      Personal history of skin cancer : NO - history of actinic keratoses.  Family history of skin cancer : NO.    Sun Exposure History  Previous history of significant sun exposure: YES    Occupation : retired.  Refer to electronic medical record (EMR) for past medical history and medications.      ROS: 14 point review of systems was negative except the symptoms listed above in the HPI.        OBJECTIVE:   GENERAL: healthy, alert and no distress.  HEENT: PERRL. Conjunctiva, sclera clear.  SKIN: Baird Skin Type - I.     Face, Groin, Buttocks examined. The dermatoscope was used to help evaluate pigmented lesions.  Skin Pertinent Findings:     Right mid cheek ( 10 mm C 5 mm ) , upper bridge of nose ( 3 mm)              Erythematous, scaly, non-indurated lesion(s) most consistent with actinic keratosis  Name: Liquid nitrogen [facility]- cryotherapy  Indication: Pre-malignant actinic keratosis  Completed by: La Thomas MD.  Note : Discussed natural history of lesion and treatment options. Prior to treatment, we discussed inflammation, tenderness post-procedure, the healing process, and the risks of pain, infection, scarring, blistering, and hypo-/hyperpigmentation after healing. Explained that these lesions may grow back and may need additional treatment or  re-evaluation. The patient understood and verbally agreed to proceed with cryotherapy.    Each actinic keratosis was treated using liquid nitrogen Cry-Ac with a two five second bursts with a pause to allow for the area to thaw.    The patient tolerated the procedure well and left in good condition. If this lesion should persist or recur, then it needs to be re-evaluated.  Total number of lesions treated: 2    Back - multiple coarse brown textured lesions                          Groin, BUS :Hypopigmentation on the vulva and perineum, 4 mm coarse light brown lesion on the right buttocks. No suspicious changes.                          Right medial buttocks-3 mm coarse textured brown lesion , 4 mm                                   Name : Liquid Nitrogen Cry-Ac Cryotherapy.  Indication : Irritated/Inflamed Benign Lesion.  Location(s) :as above  Completed by : La Thomas MD  Note : Discussed natural history of lesion and treatment options. Prior to treatment, we discussed inflammation, tenderness post-procedure, the healing process, and the risks of pain, infection, scarring, blistering, and hypo-/hyperpigmentation after healing. Explained that these lesions may grow back and may need additional treatment or re-treatment. The patient expressed a desire to proceed with cryotherapy.    The lesion(s) were treated with liquid nitrogen Cry-Ac, five second freeze repeated twice with a pause to allow for the area to thaw.    Patient tolerated the procedure well and left in good condition.  Total number of lesions treated : 1.  Treatment number : 1.    ASSESSMENT:     Encounter Diagnoses   Name Primary?     Actinic keratosis Yes     Inflamed seborrheic keratosis      Lichen sclerosus et atrophicus          PLAN:   Patient Instructions     Vaginal area-  Continue clobetasol proprinate 0.05% ointment two times per day for one more week.    ACTINIC KERATOSES POST-TREATMENT CARE INSTRUCTIONS  Actinic keratoses are benign, scaly  or gritty lesions that appear in sun-exposed areas and may progress to skin cancers. For this reason, it is important to treat them before they become cancerous. Liquid nitrogen is the most commonly used and most effective treatment for actinic keratoses; it is mildly uncomfortable when applied to the skin, but the discomfort rapidly subsides.    Post-Treatment:  You may experience burning and/or stinging immediately following the procedure. The discomfort from the procedure may persist over the next 12-24 hours. The area treated will look pinker and slightly swollen before the healing process begins. You may also notice redness, swelling, tenderness, weeping and crusts or scabs. Healing time is approximately 10-14 days.    Blister - You may or may notice blistering from the freezing. If you develop an uncomfortable blister from today's treatment, you may gently puncture this with a needle that has been cleaned with alcohol. However, do not remove the protective skin layer of the blister.    Scab - After a few days, you may notice scaliness or scab formation. Do not pick at the scabs because this may cause slower healing and a permanent scar.    The skin may appear temporarily darker at the treatment site, but this usually fades over a period of months, provided that the area is protected from the sun.    Care of the areas treated:    Wash the area with a mild cleanser.    Gently pat dry.    Do not rub.     Keep protected from the sun during the healing process and for a full year following treatment as the skin continues to remodel during this time.    Apply Vaseline or Aquaphor ointment sparingly to the site for the first 7 days after treatment.    Do not use Neosporin, as many people eventually develop a medication allergy, that can easily be confused with an infection, to Neomycin.    Return if:  There should not be any residual scaling. If there is any concern that the lesion has persisted after 4-6 weeks, make  an appointment for a re-check. Healing time does vary depending on your individual healing process and the area of the body treated. Most patients will be healed in one month.    Signs of Infection:  Thankfully this is rare. However if you notice persistent colored drainage, increasing pain, fever or other signs of infection, please call us at: [phone]        TT: 20 minutes.

## 2020-12-21 ENCOUNTER — APPOINTMENT (OUTPATIENT)
Dept: LAB | Facility: CLINIC | Age: 70
End: 2020-12-21
Payer: MEDICARE

## 2020-12-21 ENCOUNTER — OFFICE VISIT (OUTPATIENT)
Dept: FAMILY MEDICINE | Facility: CLINIC | Age: 70
End: 2020-12-21
Payer: MEDICARE

## 2020-12-21 VITALS — SYSTOLIC BLOOD PRESSURE: 122 MMHG | DIASTOLIC BLOOD PRESSURE: 64 MMHG

## 2020-12-21 DIAGNOSIS — L57.0 ACTINIC KERATOSIS: Primary | ICD-10-CM

## 2020-12-21 DIAGNOSIS — L82.0 INFLAMED SEBORRHEIC KERATOSIS: ICD-10-CM

## 2020-12-21 DIAGNOSIS — L90.0 LICHEN SCLEROSUS ET ATROPHICUS: ICD-10-CM

## 2020-12-21 PROCEDURE — 99213 OFFICE O/P EST LOW 20 MIN: CPT | Mod: 25 | Performed by: FAMILY MEDICINE

## 2020-12-21 PROCEDURE — 17000 DESTRUCT PREMALG LESION: CPT | Mod: 59 | Performed by: FAMILY MEDICINE

## 2020-12-21 PROCEDURE — 17003 DESTRUCT PREMALG LES 2-14: CPT | Mod: 59 | Performed by: FAMILY MEDICINE

## 2020-12-21 PROCEDURE — 17110 DESTRUCTION B9 LES UP TO 14: CPT | Performed by: FAMILY MEDICINE

## 2020-12-21 RX ORDER — TERCONAZOLE 8 MG/G
1 CREAM VAGINAL AT BEDTIME
Qty: 15 G | Refills: 0 | Status: SHIPPED | OUTPATIENT
Start: 2020-12-21 | End: 2020-12-24

## 2020-12-21 NOTE — PATIENT INSTRUCTIONS
Vaginal area-  Continue clobetasol proprinate 0.05% ointment two times per day for one more week.    ACTINIC KERATOSES POST-TREATMENT CARE INSTRUCTIONS  Actinic keratoses are benign, scaly or gritty lesions that appear in sun-exposed areas and may progress to skin cancers. For this reason, it is important to treat them before they become cancerous. Liquid nitrogen is the most commonly used and most effective treatment for actinic keratoses; it is mildly uncomfortable when applied to the skin, but the discomfort rapidly subsides.    Post-Treatment:  You may experience burning and/or stinging immediately following the procedure. The discomfort from the procedure may persist over the next 12-24 hours. The area treated will look pinker and slightly swollen before the healing process begins. You may also notice redness, swelling, tenderness, weeping and crusts or scabs. Healing time is approximately 10-14 days.    Blister - You may or may notice blistering from the freezing. If you develop an uncomfortable blister from today's treatment, you may gently puncture this with a needle that has been cleaned with alcohol. However, do not remove the protective skin layer of the blister.    Scab - After a few days, you may notice scaliness or scab formation. Do not pick at the scabs because this may cause slower healing and a permanent scar.    The skin may appear temporarily darker at the treatment site, but this usually fades over a period of months, provided that the area is protected from the sun.    Care of the areas treated:    Wash the area with a mild cleanser.    Gently pat dry.    Do not rub.     Keep protected from the sun during the healing process and for a full year following treatment as the skin continues to remodel during this time.    Apply Vaseline or Aquaphor ointment sparingly to the site for the first 7 days after treatment.    Do not use Neosporin, as many people eventually develop a medication allergy,  that can easily be confused with an infection, to Neomycin.    Return if:  There should not be any residual scaling. If there is any concern that the lesion has persisted after 4-6 weeks, make an appointment for a re-check. Healing time does vary depending on your individual healing process and the area of the body treated. Most patients will be healed in one month.    Signs of Infection:  Thankfully this is rare. However if you notice persistent colored drainage, increasing pain, fever or other signs of infection, please call us at: [phone]

## 2020-12-21 NOTE — TELEPHONE ENCOUNTER
See message in separate encounter. Patient did come in and dropped off urine sample today. Waiting to see if provider will order test.

## 2020-12-21 NOTE — LETTER
12/21/2020         RE: Shelley Mccrary  [address]  [place] [address]        Dear Colleague,    Thank you for referring your patient, Shelley Mccrary, to the [facility] [place]. Please see a copy of my visit note below.    [facility] - PRIMARY CARE SKIN    CC: Lesion(s)  SUBJECTIVE:   Shelley Mccrary is a(n) 70 year old female who presents to clinic today for treatment of biopsy proven actinic keratosis on the right mid cheek and right upper bridge of nose.  Issue One:               The spot previously treated in 2015 is tender but no bleeding , it has slowly increased in size  History of actinic keratosis on the right cheek, treated with cryotherapy in 2017.    Issue two : history of lichen sclerosis et atrophicus. She has felt two bumps down by the vulva and would like those evaluated. . Has been using clobetasol proprinate 0.05% ointment - used for 3 weeks.  No bleeding.      Personal history of skin cancer : NO - history of actinic keratoses.  Family history of skin cancer : NO.    Sun Exposure History  Previous history of significant sun exposure: YES    Occupation : retired.  Refer to electronic medical record (EMR) for past medical history and medications.      ROS: 14 point review of systems was negative except the symptoms listed above in the HPI.        OBJECTIVE:   GENERAL: healthy, alert and no distress.  HEENT: PERRL. Conjunctiva, sclera clear.  SKIN: Baird Skin Type - I.     Face, Groin, Buttocks examined. The dermatoscope was used to help evaluate pigmented lesions.  Skin Pertinent Findings:     Right mid cheek ( 10 mm C 5 mm ) , upper bridge of nose ( 3 mm)              Erythematous, scaly, non-indurated lesion(s) most consistent with actinic keratosis  Name: Liquid nitrogen Cry-Ac cryotherapy  Indication: Pre-malignant actinic keratosis  Completed by: La Thomas MD.  Note : Discussed natural history of lesion and treatment options.  Prior to treatment, we discussed inflammation, tenderness post-procedure, the healing process, and the risks of pain, infection, scarring, blistering, and hypo-/hyperpigmentation after healing. Explained that these lesions may grow back and may need additional treatment or re-evaluation. The patient understood and verbally agreed to proceed with cryotherapy.    Each actinic keratosis was treated using liquid nitrogen Cry-Ac with a two five second bursts with a pause to allow for the area to thaw.    The patient tolerated the procedure well and left in good condition. If this lesion should persist or recur, then it needs to be re-evaluated.  Total number of lesions treated: 2    Back - multiple coarse brown textured lesions                          Groin, BUS :Hypopigmentation on the vulva and perineum, 4 mm coarse light brown lesion on the right buttocks. No suspicious changes.                          Right medial buttocks-3 mm coarse textured brown lesion , 4 mm                                   Name : Liquid Nitrogen Cry-Ac Cryotherapy.  Indication : Irritated/Inflamed Benign Lesion.  Location(s) :as above  Completed by : La Thomas MD  Note : Discussed natural history of lesion and treatment options. Prior to treatment, we discussed inflammation, tenderness post-procedure, the healing process, and the risks of pain, infection, scarring, blistering, and hypo-/hyperpigmentation after healing. Explained that these lesions may grow back and may need additional treatment or re-treatment. The patient expressed a desire to proceed with cryotherapy.    The lesion(s) were treated with liquid nitrogen Cry-Ac, five second freeze repeated twice with a pause to allow for the area to thaw.    Patient tolerated the procedure well and left in good condition.  Total number of lesions treated : 1.  Treatment number : 1.    ASSESSMENT:     Encounter Diagnoses   Name Primary?     Actinic keratosis Yes     Inflamed seborrheic  keratosis      Lichen sclerosus et atrophicus          PLAN:   Patient Instructions     Vaginal area-  Continue clobetasol proprinate 0.05% ointment two times per day for one more week.    ACTINIC KERATOSES POST-TREATMENT CARE INSTRUCTIONS  Actinic keratoses are benign, scaly or gritty lesions that appear in sun-exposed areas and may progress to skin cancers. For this reason, it is important to treat them before they become cancerous. Liquid nitrogen is the most commonly used and most effective treatment for actinic keratoses; it is mildly uncomfortable when applied to the skin, but the discomfort rapidly subsides.    Post-Treatment:  You may experience burning and/or stinging immediately following the procedure. The discomfort from the procedure may persist over the next 12-24 hours. The area treated will look pinker and slightly swollen before the healing process begins. You may also notice redness, swelling, tenderness, weeping and crusts or scabs. Healing time is approximately 10-14 days.    Blister - You may or may notice blistering from the freezing. If you develop an uncomfortable blister from today's treatment, you may gently puncture this with a needle that has been cleaned with alcohol. However, do not remove the protective skin layer of the blister.    Scab - After a few days, you may notice scaliness or scab formation. Do not pick at the scabs because this may cause slower healing and a permanent scar.    The skin may appear temporarily darker at the treatment site, but this usually fades over a period of months, provided that the area is protected from the sun.    Care of the areas treated:    Wash the area with a mild cleanser.    Gently pat dry.    Do not rub.     Keep protected from the sun during the healing process and for a full year following treatment as the skin continues to remodel during this time.    Apply Vaseline or Aquaphor ointment sparingly to the site for the first 7 days after  treatment.    Do not use Neosporin, as many people eventually develop a medication allergy, that can easily be confused with an infection, to Neomycin.    Return if:  There should not be any residual scaling. If there is any concern that the lesion has persisted after 4-6 weeks, make an appointment for a re-check. Healing time does vary depending on your individual healing process and the area of the body treated. Most patients will be healed in one month.    Signs of Infection:  Thankfully this is rare. However if you notice persistent colored drainage, increasing pain, fever or other signs of infection, please call us at: [phone]        TT: 20 minutes.          Again, thank you for allowing me to participate in the care of your patient.        Sincerely,        Modesta Thomas MD

## 2020-12-21 NOTE — TELEPHONE ENCOUNTER
She did not have urine  test this visit as she had one fairly recently and it looked fine.   She can use OTC monistat to see if helps . May do virtual  follow up visit  to discuss for any ongoing sx.

## 2020-12-21 NOTE — TELEPHONE ENCOUNTER
Please see [employer] message and advise.      Thank you,  Madai FINNRN BSN  [facility]  [phone]

## 2020-12-21 NOTE — TELEPHONE ENCOUNTER
Called patient to clarify. Pt left urine on 12/15, but no record of this in chart. She also left urine today, hoping provider will run UA.     -Main complaint is frequency / urgency mostly at night  -Denies pain, stinging, burning   -Denies fever     Provider see message from separate encounter. Please order UA if wanting to assess.

## 2020-12-22 NOTE — TELEPHONE ENCOUNTER
"Talked to pt. She thinks it \"could be my nerves\" , as seen in  skin care and relived lot of anxiety,  so she thinks  she is feeling a bit better today.   Also willing to try vaginal cream so script faxed.   Order has been placed and she can schedule follow up labs any time she decides to do it   "

## 2020-12-23 NOTE — TELEPHONE ENCOUNTER
"Patient Contact    Called patient. She states she feels \"100% better but if they still have urine from 12/21, she would like a UA run on it.\"     Huddled with lab. They are no longer able to run UA on her sample.     Called and notified patient. She verbalizes understanding.      "

## 2021-01-21 ENCOUNTER — NURSE TRIAGE (OUTPATIENT)
Dept: NURSING | Facility: CLINIC | Age: 71
End: 2021-01-21

## 2021-01-21 DIAGNOSIS — N39.0 URINARY TRACT INFECTION WITHOUT HEMATURIA, SITE UNSPECIFIED: Primary | ICD-10-CM

## 2021-01-21 RX ORDER — CIPROFLOXACIN 250 MG/1
250 TABLET, FILM COATED ORAL 2 TIMES DAILY
Qty: 6 TABLET | Refills: 0 | Status: SHIPPED | OUTPATIENT
Start: 2021-01-21 | End: 2021-01-24

## 2021-01-21 NOTE — TELEPHONE ENCOUNTER
Script  Faxed for Cipro and she can try that to see if helps .   Remind pt to do follow up for any ongoing problem or concerns. Virtual/ Video visit  would be ok

## 2021-01-21 NOTE — TELEPHONE ENCOUNTER
Last time her urine culture did not really confirm an infection , so if her sx are coming back, I thinks  it is best that she should see someone there and have her urine checked  to see if it is truly UTI,so then we treat accordingly   Let me know if she has any questions

## 2021-01-21 NOTE — TELEPHONE ENCOUNTER
Patient states that she did go to [facility] and got a prescription. Patient will take medication as prescribed by [facility] in [place]. Yennifer Strauss RN

## 2021-01-21 NOTE — TELEPHONE ENCOUNTER
Shelley is calling and states that she had a UTI in December and was treated for it.  Shelley is having urgency and painful urination.  Shelley is currently in [place] for the winter.  Shelley is calling and is requesting a prescription be sent to [facility] in [place], [place] and the telephone number is [phone].  Please phone Shelley.      COVID 19 Nurse Triage Plan/Patient Instructions    Please be aware that novel coronavirus (COVID-19) may be circulating in the community. If you develop symptoms such as fever, cough, or SOB or if you have concerns about the presence of another infection including coronavirus (COVID-19), please contact your health care provider or visit [email or web address].     Disposition/Instructions    Home care recommended. Follow home care protocol based instructions.    Thank you for taking steps to prevent the spread of this virus.  o Limit your contact with others.  o Wear a simple mask to cover your cough.  o Wash your hands well and often.    Resources    [facility]: About COVID-19: [email or web address]    CDC: What to Do If You're Sick: www.cdc.gov/coronavirus/2019-ncov/about/steps-when-sick.html    CDC: Ending Home Isolation: www.cdc.gov/coronavirus/2019-ncov/hcp/disposition-in-home-patients.html     CDC: Caring for Someone: www.cdc.gov/coronavirus/2019-ncov/if-you-are-sick/care-for-someone.html     [facility]: Interim Guidance for Hospital Discharge to Home: www.health.[facility].[place].us/diseases/coronavirus/hcp/hospdischarge.pdf    [facility] clinical trials (COVID-19 research studies): clinicalaffairs.[email or web address]/n-clinical-trials     Below are the COVID-19 hotlines at the [facility] ([facility]). Interpreters are available.   o For health questions: Call 851-299-2771 or 1-569.606.1337 (7 a.m. to 7 p.m.)  o For questions about schools and childcare: Call 216-272-8372 or 1-158.472.4543 (7 a.m. to 7 p.m.)                       Additional Information    Negative: Shock  suspected (e.g., cold/pale/clammy skin, too weak to stand, low BP, rapid pulse)    Negative: Sounds like a life-threatening emergency to the triager    Negative: Unable to urinate (or only a few drops) and bladder feels very full    Negative: Vomiting    Negative: Patient sounds very sick or weak to the triager    Negative: SEVERE pain with urination    Negative: Fever > 100.5 F (38.1 C)    Negative: Side (flank) or lower back pain present    Negative: Taking antibiotic > 24 hours for UTI and fever persists    Negative: Taking antibiotic > 3 days for UTI and painful urination not improved    Negative: Unusual vaginal discharge    > 2 UTIs in last year    Protocols used: URINATION PAIN - FEMALE-A-[place]

## 2021-01-21 NOTE — TELEPHONE ENCOUNTER
S/w pt and was given Dr. Mahmood's reply below.  Pt states she did come in before leaving for [place] and states the urine was either thrown out or sat in the refrigerator too long.  Pt also states she might just use abx that she got from [place] to treat it.    Pt can be reached at [phone].    Maryanne BURCIAGA RN  EP Triage

## 2021-03-24 DIAGNOSIS — F43.23 ADJUSTMENT DISORDER WITH MIXED ANXIETY AND DEPRESSED MOOD: ICD-10-CM

## 2021-03-25 RX ORDER — SERTRALINE HYDROCHLORIDE 25 MG/1
TABLET, FILM COATED ORAL
Qty: 90 TABLET | Refills: 0 | Status: SHIPPED | OUTPATIENT
Start: 2021-03-25 | End: 2021-05-26

## 2021-04-20 ENCOUNTER — TELEPHONE (OUTPATIENT)
Dept: NURSING | Facility: CLINIC | Age: 71
End: 2021-04-20

## 2021-04-20 NOTE — TELEPHONE ENCOUNTER
"Patient reports that she was seen in December for her physical and was going to have ear wax cleaned out of her ears at the visit, but then they did not get it done that day. Patient was then in [place] and is now back. She reports that she has \"plugged\" ears and would like to get them cleaned at this time. Patient denies any pain or any further concerns.    Please advise. Can patient schedule a nurse visit to have ears cleaned or does she need to see her provider first? Please contact patient with recommendations.    Franchesca Spian RN  [facility]    "

## 2021-04-20 NOTE — TELEPHONE ENCOUNTER
"S/w pt and advised will need to be seen by provider first.  Pt states she is not having any pain in her ears but they do feel \"plugged\" and thinks they just need to be cleaned out.    Scheduled with Dr. Mahmood tomorrow at 9:40 am.    Maryanne BURCIAGA RN  EP Triage    "

## 2021-04-21 ENCOUNTER — OFFICE VISIT (OUTPATIENT)
Dept: FAMILY MEDICINE | Facility: CLINIC | Age: 71
End: 2021-04-21
Payer: MEDICARE

## 2021-04-21 VITALS
OXYGEN SATURATION: 98 % | HEIGHT: 61 IN | DIASTOLIC BLOOD PRESSURE: 72 MMHG | HEART RATE: 71 BPM | TEMPERATURE: 97.4 F | SYSTOLIC BLOOD PRESSURE: 130 MMHG | RESPIRATION RATE: 16 BRPM | BODY MASS INDEX: 25.68 KG/M2 | WEIGHT: 136 LBS

## 2021-04-21 DIAGNOSIS — H61.23 BILATERAL IMPACTED CERUMEN: Primary | ICD-10-CM

## 2021-04-21 PROCEDURE — 69209 REMOVE IMPACTED EAR WAX UNI: CPT | Performed by: FAMILY MEDICINE

## 2021-04-21 ASSESSMENT — MIFFLIN-ST. JEOR: SCORE: 1059.27

## 2021-04-21 NOTE — PATIENT INSTRUCTIONS
Patient Education       Earwax, Home Treatment    Everyone produces earwax from the lining of the ear canal. It serves to lubricate and protect the ear. The wax that forms in the canal naturally moves toward the outside of the ear and falls out. Sometimes the ear canal may contain too much wax. This can cause a blockage and loss of hearing. Directions are given below for home treatment.  Home care  If your doctor has advised you to remove a wax blockage yourself, follow these directions:    Unless a medicine was prescribed, you may use an over-the-counter product made for clearing earwax. These contain carbamide peroxide. Lie down with the blocked ear facing upward. Apply one dropper full of medicine and wait a few minutes. Grasp the outer ear and wiggle it to help the solution enter the canal.    Lean over a sink or basin with the blocked ear facing downward. Use a bulb syringe filled with warm (not hot or cold) water to rinse the ear several times. Use gentle pressure only.    If you are having trouble draining the water out of your ear canal, put a few drops of rubbing alcohol (isopropyl alcohol) into the ear canal. This will help remove the remaining water.    Repeat this procedure once a day for up to three days, or until your hearing is back to normal. Do not use this treatment for more than three days in a row.  Don ts    Don t use cold water to rinse the ear. This will make you dizzy.    Don t perform this procedure if you have an ear infection.    Don t perform this procedure if you have a ruptured eardrum.    Don t use cotton swabs, matches, hairpins, keys, or other objects to  clean  the ear canal. This can cause infection of the ear canal or rupture the eardrum. Because of their size and shape, cotton swabs can push earwax deeper into the ear canal instead of removing it.  Follow-up care  Follow up with your health care provider if you are not improving after three cleaning attempts, or as  advised.  When to seek medical advice  Call your health care provider right away if any of these occur:    Worsening ear pain    Fever of 101 F (38.3 C) or higher, or as directed by your health care provider    Hearing does not return to normal after three days of treatment    Fluid drainage or bleeding from the ear canal    Swelling, redness, or tenderness of the outer ear    Headache, neck pain, or stiff neck    6420-2055 [employer]. [address] [place] [address], [place] [id number]. All rights reserved. This information is not intended as a substitute for professional medical care. Always follow your healthcare professional's instructions.

## 2021-04-21 NOTE — PROGRESS NOTES
Assessment & Plan     (H61.23) Bilateral impacted cerumen  (primary encounter diagnosis)  Comment:   Plan: AK REMOVAL IMPACTED CERUMEN IRRIGATION/LVG         UNILAT          Cerumenosis is noted.  Wax is removed by syringing and manual debridement. Instructions for home care to prevent wax buildup are given.      Fabiola Mahmood MD  [facility] is a 71 year old who presents for the following health issues     HPI     Concern - Ears plugged  Onset:   Description: pt states that her ears are plugged. No pain , no other uri sx   Intensity: mild  Progression of Symptoms:  same  Accompanying Signs & Symptoms: none  Previous history of similar problem: yes  Precipitating factors:        Worsened by: none  Alleviating factors:        Improved by: none  Therapies tried and outcome:  none         Review of Systems   Constitutional, HEENT, cardiovascular, pulmonary, GI, , musculoskeletal, neuro, skin, endocrine and psych systems are negative, except as otherwise noted.      Objective    There were no vitals taken for this visit.  There is no height or weight on file to calculate BMI.  Physical Exam   GENERAL: healthy, alert and no distress  EYES: Eyes grossly normal to inspection, and conjunctivae and sclerae normal  HENT: nose and mouth without ulcers or lesions   both ears: occluded with wax, oropharynx clear and oral mucous membranes moist  NECK: no adenopathy  RESP: lungs clear to auscultation - no rales, rhonchi or wheezes  CV: regular rate and rhythm, normal S1 S2, no S3 or S4,

## 2021-05-04 ENCOUNTER — OFFICE VISIT (OUTPATIENT)
Dept: FAMILY MEDICINE | Facility: CLINIC | Age: 71
End: 2021-05-04
Payer: MEDICARE

## 2021-05-04 ENCOUNTER — TELEPHONE (OUTPATIENT)
Dept: FAMILY MEDICINE | Facility: CLINIC | Age: 71
End: 2021-05-04

## 2021-05-04 VITALS
DIASTOLIC BLOOD PRESSURE: 82 MMHG | WEIGHT: 135 LBS | OXYGEN SATURATION: 98 % | SYSTOLIC BLOOD PRESSURE: 120 MMHG | TEMPERATURE: 97.5 F | BODY MASS INDEX: 25.51 KG/M2 | RESPIRATION RATE: 16 BRPM | HEART RATE: 76 BPM

## 2021-05-04 DIAGNOSIS — F43.23 ADJUSTMENT DISORDER WITH MIXED ANXIETY AND DEPRESSED MOOD: ICD-10-CM

## 2021-05-04 DIAGNOSIS — R42 VERTIGO: Primary | ICD-10-CM

## 2021-05-04 DIAGNOSIS — I42.8 OTHER CARDIOMYOPATHY (H): ICD-10-CM

## 2021-05-04 DIAGNOSIS — G24.5 EYE TWITCH: ICD-10-CM

## 2021-05-04 DIAGNOSIS — F43.0 PANIC ATTACK AS REACTION TO STRESS: ICD-10-CM

## 2021-05-04 DIAGNOSIS — F41.0 PANIC ATTACK AS REACTION TO STRESS: ICD-10-CM

## 2021-05-04 PROCEDURE — 99214 OFFICE O/P EST MOD 30 MIN: CPT | Performed by: FAMILY MEDICINE

## 2021-05-04 RX ORDER — MECLIZINE HYDROCHLORIDE 25 MG/1
25 TABLET ORAL 2 TIMES DAILY PRN
Qty: 14 TABLET | Refills: 0 | Status: SHIPPED | OUTPATIENT
Start: 2021-05-04 | End: 2021-12-16

## 2021-05-04 RX ORDER — ALPRAZOLAM 0.25 MG
0.25 TABLET ORAL 3 TIMES DAILY PRN
Qty: 10 TABLET | Refills: 0 | Status: SHIPPED | OUTPATIENT
Start: 2021-05-04 | End: 2022-12-26

## 2021-05-04 NOTE — PROGRESS NOTES
Assessment & Plan     (R42) Vertigo  (primary encounter diagnosis)  Comment:   Plan: meclizine (ANTIVERT) 25 MG tablet            (G24.5) Eye twitch  Comment: muscle   Plan: ALPRAZolam (XANAX) 0.25 MG tablet            (F41.0,  F43.0) Panic attack as reaction to stress  Comment:   Plan: ALPRAZolam (XANAX) 0.25 MG tablet    (F43.23) Adjustment disorder with mixed anxiety and depressed mood  Comment:   Plan: ALPRAZolam (XANAX) 0.25 MG tablet            (I42.8) Other cardiomyopathy (H)  Comment: seeing cardiology- stable   Plan:     Patient with known previous history of vertigo, recent aggravation.  She is having some eye twitching, which I suspect could be related to anxiety, I do not notice any nystagmus today on her exam.  Clinically exam is normal, so reassurance and observation.  She is given refill on meclizine to take as needed.   She admits that her anxiety has been aggravated, and felt a little panicky because of the vertigo and some symptoms that she was feeling, so she decided to go back on Zoloft which she had stopped for a while.  Also given few Xanax to take as needed to help with anxiety and panic feeling consider also help calm down her, eye twitching.  She had her eye exam which is normal few months ago.  Going issues or problems she will also like to see her eye doctor.  No fever vertigo should resolve.  Any ongoing recurrent problem she was advised to do a follow-up, and will consider further evaluation as needed.   Talked about warning s/s for which should be seen  Check labs. refill sent.Cares and  treatment discussed follow. up if problem   Patient expressed understanding and agreement with treatment plan. All patient's questions were answered, will let me know if has more later.  Medications: Rx's: Reviewed the potential side effects/complications of medications prescribed.                              No follow-ups on file.    Fabiola Mahmood MD  [facility]  "CARMELLA Rosa is a 71 year old who presents for the following health issues     HPI     Concern - vision / eye problem   Onset: since April 5th, after the ear wash - she felt a bit dizzy vertigo feeling and eye feel heavy \"- so wanted to get checked   Description: pt had an ear wash and since she feels like\" when she turns her head she gets vertigo feeling  it takes some time for her eyes to catch up'.  When pt closes right eye there is a wiggle and eyes feel tired, feels good to shut her eyes.it happens same time as the dizzy feeling . Sx come and goes. She wakes up fine and  as she get up moving around then sx come on, mostly  off and  and on, happens mostly when she turns her head to the side .   Intensity: moderate  Progression of Symptoms:  same  Accompanying Signs & Symptoms: no nausea vomiting, no numbness,no  focal weakness, no headache, no  confusion , no chest pain. No  Palpitation, sob  etc    Has known hx of vertigo, but it was more then a year ago . Also has hx of heart problem/ cardiomyopathy etc but she thinks her heart is ok and she is seeing cardiology and has follow up coming up probably this year sometimes    Previous history of similar problem: none  Precipitating factors: as above        Worsened by: none  Alleviating factors:        Improved by: none  Therapies tried and outcome:  none     Anxiety Follow-Up    How are you doing with your anxiety since your last visit? She had been off Zoloft for a while but she thinsk her anxiety has worsening last few weeks     bc of her recent sx , \"she get anxious with any issue with health ' and she gets nervous and panic feeling,  so she decided to go back on Zoloft. And she would like  to continue     Are you having other symptoms that might be associated with anxiety? Yes:  as above     Have you had a significant life event? No     Are you feeling depressed? Yes:  may be mildly bc of her helath conditions make her nervous     Do you have " any concerns with your use of alcohol or other drugs? No    Social History     Tobacco Use     Smoking status: Former Smoker     Types: Cigarettes     Quit date: 1990     Years since quittin.3     Smokeless tobacco: Never Used     Tobacco comment: smoked 1-2 pack week, 30 years   Substance Use Topics     Alcohol use: No     Alcohol/week: 0.0 standard drinks     Drug use: No     KALI-7 SCORE 2019 2019 12/15/2020   Total Score - - -   Total Score - - -   Total Score 0 0 13     PHQ 2019 2020 12/15/2020   PHQ-9 Total Score 0 0 3   Q9: Thoughts of better off dead/self-harm past 2 weeks Not at all Not at all Not at all     Last PHQ-9 12/15/2020   1.  Little interest or pleasure in doing things 1   2.  Feeling down, depressed, or hopeless 1   3.  Trouble falling or staying asleep, or sleeping too much 0   4.  Feeling tired or having little energy 0   5.  Poor appetite or overeating 0   6.  Feeling bad about yourself 0   7.  Trouble concentrating 1   8.  Moving slowly or restless 0   Q9: Thoughts of better off dead/self-harm past 2 weeks 0   PHQ-9 Total Score 3   Difficulty at work, home, or with people Not difficult at all     KALI-7  12/15/2020   1. Feeling nervous, anxious, or on edge 2   2. Not being able to stop or control worrying 2   3. Worrying too much about different things 2   4. Trouble relaxing 2   5. Being so restless that it is hard to sit still 1   6. Becoming easily annoyed or irritable 2   7. Feeling afraid, as if something awful might happen 2   KALI-7 Total Score 13   If you checked any problems, how difficult have they made it for you to do your work, take care of things at home, or get along with other people? Somewhat difficult           Review of Systems   Constitutional, HEENT, cardiovascular, pulmonary, GI, , musculoskeletal, neuro, skin, endocrine and psych systems are negative, except as otherwise noted.      Objective    There were no vitals taken for this  visit.  There is no height or weight on file to calculate BMI.  Physical Exam   GENERAL: healthy, alert and no distress  EYES: Eyes grossly normal to inspection, PERRL and conjunctivae and sclerae normal  HENT: ear canals and TM's normal, nose and mouth without ulcers or lesions  NECK: no adenopathy, no asymmetry, masses, or scars and thyroid normal to palpation  RESP: lungs clear to auscultation - no rales, rhonchi or wheezes  CV: regular rate and rhythm, normal S1 S2, no S3 or S4, no murmur,no peripheral edema   ABDOMEN: soft, nontender, no hepatosplenomegaly, no masses and bowel sounds normal  NEURO: Normal strength and tone, sensory exam grossly normal, mentation intact, speech normal, cranial nerves 2-12 intact,  , gait normal including heel/toe/tandem walking, Romberg normal and rapid alternating movements normal  PSYCH: mentation appears normal, affect normal/bright, anxious, speech pressured, judgement and insight intact and appearance well groomed

## 2021-05-04 NOTE — PATIENT INSTRUCTIONS
Patient Education     Your Body s Response to Anxiety  Normal anxiety is part of the body s natural defense system. It's an alert to a threat that is unknown, vague, or comes from your own internal fears. While you re in this state, your feelings can range from a vague sense of worry to physical sensations such as a pounding heartbeat. These feelings make you want to react to the threat. An anxiety response is normal in many situations. But when you have an anxiety disorder, the same response can occur at the wrong times.   Anxiety can be helpful  Normal anxiety is a signal from your brain. It warns you of a threat. It's a normal response to help you prevent something. Or to decrease the bad effects of something you can't control. For example, anxiety is a normal response to situations that might harm your body, separate you from a loved one, or lose your job. The symptoms of anxiety can be physical and mental.   How does it feel?  People with anxiety may have:    Dizziness    Muscle tension or pain    Restlessness    Sleeplessness    Trouble focusing    Racing heartbeat    Fast breathing    Shaking or trembling    Stomachache    Diarrhea    Loss of energy    Sweating    Cold, clammy hands    Chest pain    Dry mouth  Anxiety can also be a problem  Anxiety can become a problem when it is hard to control, occurs for months, and interferes with important parts of your life. With an anxiety disorder, your body has the response described above, but in inappropriate ways. The response a person has depends on the anxiety disorder he or she has. With some disorders, the anxiety is way out of proportion to the threat that triggers it. With others, anxiety may occur even when there isn t a clear threat or trigger.   Who does it affect?  Some people are more likely to have lasting anxiety than others. It tends to run in families. And it affects more younger people than older people, and more women than men. But no age, race,  or gender is immune to anxiety problems.   Anxiety can be treated  The good news is that the anxiety that s disrupting your life can be treated. Check with your healthcare provider and rule out any physical problems that may be causing the anxiety symptoms. If an anxiety disorder is diagnosed, seek mental healthcare. This is an illness and it can respond to treatment. Most types of anxiety disorders will respond to talk therapy (counseling) and medicines. Working with your doctor or other healthcare provider, you can develop skills to help you cope with anxiety. You can also gain the perspective you need to overcome your fears. Good sources of support or guidance can be found at your local hospital, mental health clinic, or an employee assistance program.     How to cope with anxiety  Here are some things you can do to cope:    Do what you can.  Keep in mind that you can t control everything. Change what you can. And let the rest take its course.    Exercise. This is a great way to ease tension and help your body feel relaxed.    Stay away from caffeine and nicotine.  These can make anxiety symptoms worse.    Stay sober.  Don't use alcohol or unprescribed medicines. They only make things worse in the long run.    Learn more about anxiety disorders.  Keep track of helpful online resources and books you can use during stressful periods.    Try stress management. Try methods such as meditation.    Talk with others. Think about joining online or in-person support groups.    Get help. Find professional mental health services if your symptoms can't be managed or reduced with the above methods.  [employer] last reviewed this educational content on 4/1/2020 2000-2021 [employer]. All rights reserved. This information is not intended as a substitute for professional medical care. Always follow your healthcare professional's instructions.           Patient Education     Benign Paroxysmal Positional  Vertigo    Benign paroxysmal positional vertigo (BPPV) is a common condition. You feel as if the room is spinning after changing position, moving your head quickly, or even just rolling over in bed.  Vertigo is a false feeling of motion plus disorientation that makes it seem as if the room is spinning. A vertigo attack may cause sudden nausea, vomiting, and heavy sweating. Severe vertigo causes a loss of balance. You may even fall down.  Vertigo is caused by a problem with the inner ear. The inner ear is located behind the middle ear. It is a part of the balance center of the body. It contains small calcium particles within fluid-filled canals (semi-circular canals). These particles can move out of position. This may happen as a result of aging, head injury, or disease of the inner ear. Once that happens, moving your head in certain ways may cause the particles to stimulate the inner ear. This creates the feeling of vertigo.  An episode of vertigo may last seconds, minutes, or hours. Once you are over the first episode of vertigo, it may never return. Sometimes symptoms return off and on for several weeks or longer.  BPPV is treatable. The Epley maneuver is a simple treatment for the common cause of vertigo. Your provider may try to put the calcium particles back in their correct position by having you do a series of head movements.  Home care  Follow these guidelines when caring for yourself at home:    Rest quietly in bed if your symptoms are severe. Change position slowly. There is usually 1 position that will feel best. This might be lying on one side or lying on your back with your head slightly raised on pillows. Until you have no symptoms, you are at a higher risk of falling. Let someone help you when you get up. Get rid of home hazards such as loose electrical cords and throw rugs. Don t walk in unfamiliar areas that aren't lighted. Use night lights in bathrooms and kitchen areas.    Don't drive or work with  dangerous machinery for 1 week after symptoms go away. This is in case symptoms return suddenly.    Take medicine as prescribed to relieve your symptoms. Unless another medicine was prescribed for nausea, vomiting, and vertigo, you may use over-the-counter motion sickness medicine. Examples of this include meclizine and dimenhydrinate. Don't take over-the-counter medicine for this condition without talking with your healthcare provider, especially the first time.  Follow-up care  Follow up with your healthcare provider or an ear, nose, and throat doctor (ENT or otolaryngologist), or as directed. Tell your provider about any ringing in your ear or hearing loss.  If you had a CT or MRI scan, a specialist will review it. You'll be told of any new findings that may affect your care.  When to get medical advice  Call your healthcare provider right away if any of these occur:    Vertigo gets worse even after taking prescribed medicine    Repeated vomiting even after taking prescribed medicine    Weakness that gets worse    Trouble hearing    Fever of 100.4 F (38 C) or higher, or as directed by your healthcare provider  Call 911  Call 911 right away if any of these occur:    Fainting    Severe headache or abnormal drowsiness or confusion    Weakness of an arm or leg or one side of the face    Trouble with speech or vision    Trouble walking    Seizure    Fast heart rate    Chest pain  [employer] last reviewed this educational content on 5/1/2020 2000-2021 [employer]. All rights reserved. This information is not intended as a substitute for professional medical care. Always follow your healthcare professional's instructions.

## 2021-05-04 NOTE — TELEPHONE ENCOUNTER
"Requested by Dr. Mahmood to further triage patient before in clinic appointment today.     Called patient:    Patient states she just had her ears cleaned and has \"tiny ear canals\". Pt states that since her ear cleaning she has noticed feeling slightly unbalanced when she turns her head to the right or left and then brings it back to center. Denies dizziness, lightheadedness or vertigo.     Pt also states that she has a twitch on her right lower eyelid since her ear cleaning. Pt has not had any change in vision, blurry vision or vision loss.  Pt denies pain in her ears or eyes.     Will route to Dr. Mahmood.     Sunita Elizalde RN    "

## 2021-05-06 ENCOUNTER — TELEPHONE (OUTPATIENT)
Dept: CARDIOLOGY | Facility: CLINIC | Age: 71
End: 2021-05-06

## 2021-05-06 ENCOUNTER — OFFICE VISIT (OUTPATIENT)
Dept: CARDIOLOGY | Facility: CLINIC | Age: 71
End: 2021-05-06
Payer: MEDICARE

## 2021-05-06 VITALS
SYSTOLIC BLOOD PRESSURE: 158 MMHG | HEART RATE: 92 BPM | HEIGHT: 61 IN | DIASTOLIC BLOOD PRESSURE: 92 MMHG | WEIGHT: 139.4 LBS | BODY MASS INDEX: 26.32 KG/M2

## 2021-05-06 DIAGNOSIS — I10 BENIGN ESSENTIAL HYPERTENSION: Primary | ICD-10-CM

## 2021-05-06 DIAGNOSIS — E78.5 HYPERLIPIDEMIA LDL GOAL <70: ICD-10-CM

## 2021-05-06 DIAGNOSIS — I25.10 CORONARY ARTERY DISEASE INVOLVING NATIVE CORONARY ARTERY OF NATIVE HEART WITHOUT ANGINA PECTORIS: ICD-10-CM

## 2021-05-06 PROCEDURE — 99214 OFFICE O/P EST MOD 30 MIN: CPT | Performed by: PHYSICIAN ASSISTANT

## 2021-05-06 RX ORDER — LISINOPRIL 5 MG/1
5 TABLET ORAL DAILY
Qty: 30 TABLET | Refills: 3 | Status: SHIPPED | OUTPATIENT
Start: 2021-05-06 | End: 2021-05-07

## 2021-05-06 RX ORDER — LISINOPRIL 5 MG/1
5 TABLET ORAL DAILY
Status: DISCONTINUED | OUTPATIENT
Start: 2021-05-06 | End: 2021-05-06

## 2021-05-06 ASSESSMENT — MIFFLIN-ST. JEOR: SCORE: 1074.43

## 2021-05-06 NOTE — PROGRESS NOTES
Service Date: 05/06/2021    Shelley is a pleasant 71-year-old female who called into the office earlier today with concerns of elevated blood pressure.  She was added on to my schedule today.  Today is the first time I am meeting the patient.    Her cardiovascular history includes coronary artery disease with history of inferolateral myocardial infarction in 07/2015, at which time she underwent RCA stenting and posterolateral branch angioplasty at [facility].  She has followed closely with Dr. Lovett since that time.    Shelley states starting about 2 weeks ago, she noted a pressure in her ears.  Initially, she just thought that she had cerumen impaction and went to her primary care provider and had her ears washed out, but her symptoms continued.  Subsequent to this, she also developed twitching in her right eye.  Along with these symptoms, she noted that her blood pressure has been intermittently elevated.  Yesterday, she took her blood pressure and it was elevated and she was quite concerned about this.  She rechecked it and her blood pressure was even higher, which made her more concerned.  She then developed a somewhat flushed feeling from head to toes.  She states her systolic blood pressure went as high as 180.  She talked with her daughter over the phone and was able to calm down and her symptoms slowly resolved.  Due to this episode, she called into the clinic earlier today and talked with Dr. Lovett's nurse who added her on to my schedule today.    She denies any recent medication or dietary changes prior to the initiation of her symptoms, although does state that she recently restarted Zoloft due to the anxiety she is experiencing because of the other symptoms.  She was also in her primary care provider's office 2 days ago and was prescribed Xanax and given a refill of meclizine.    She denies any symptoms such as chest discomfort or dyspnea on exertion.  She continues to remain quite active, walking 2-4  miles on a regular basis without any cardiovascular symptoms or limitations.  She last had an exercise nuclear stress test in 09/2019.  This showed an area of infarction consistent with her prior MI without any ischemia, preserved LV systolic function.    CURRENT CARDIAC MEDICATIONS:  Aspirin 81 mg daily, atorvastatin 40 mg daily, Toprol-XL 25 mg daily (she takes this in the evening), sublingual nitro p.r.n.    The remainder of her medications, allergies, and review of systems reviewed and as are documented separately.    PHYSICAL EXAMINATION:    GENERAL:  The patient is a pleasant 71-year-old female who appears her stated age.  She is in no apparent distress.  She does appear a little bit anxious today.  VITAL SIGNS:  Her blood pressure 158/92, pulse 92, weight is 139 pounds, which is stable.  RESPIRATORY:  Breathing is nonlabored.  Lungs are clear to auscultation.  CARDIAC:  Reveals a regular rate and rhythm.  I do not appreciate any significant murmur.  EXTREMITIES:  Lower extremities show no evidence of edema.  NEUROLOGIC:  Alert and oriented.    DATA REVIEWED:  The patient had blood work in 12/2021.  Total cholesterol is 154, HDL 67, LDL 77, triglycerides of 52.  Sodium was 138, potassium is 4.4, BUN 16, creatinine stable at 0.81.  ALT was within normal limits.    ASSESSMENT AND PLAN:  51-year-old female with history of coronary artery disease and prior myocardial infarction for which she underwent stenting to the RCA and angioplasty of the posterolateral branch, who presents with elevated blood pressure readings.  She has also been having a somewhat full feeling in her ears and some twitching of her eye.  It is difficult to say that these are from the hypertension.    1.  HTN. I do see in prior notes from Dr. Lovett that in the past she had been on lisinopril but apparently had some lower blood pressure readings and it was stopped.  It does not look like she had any particular intolerance to the medication.  I  suggested that we restart a low dose at 5 mg daily.  She was agreeable to this.  We will plan to repeat a basic metabolic panel in a couple of weeks.  I will also check her hemoglobin and TSH at that time just to make sure nothing new is going on.  We discussed that most likely this is just an age-related process.  She is not having any anginal symptoms and remains quite active.  Additionally, she had a normal stress test about 18 months ago.  Therefore, I do not feel that she needs any further cardiac testing at this particular time. There likely is also an anxiety component.   2.  Coronary artery disease as detailed above.  She remains on aspirin, statin, and beta blocker therapy.  Again, no recent anginal symptoms.  3.  Hyperlipidemia.  Last lipids looked good.  Continue current statin therapy.    I have asked her to follow up with me in 2-3 weeks with basic metabolic panel, hemoglobin and TSH prior to that office visit.  Of course, I encouraged her to contact me sooner with questions or concerns.    35 minutes spent on the date of the encounter with chart review, review of prior testing, patient visit and documentation.    Eliza Santana PA-C        D: 2021   T: 2021   MT: yadira    Name:     JAYLA PISANO  MRN:      [id number]        Account:      [id number]   :      1950           Service Date: 2021       Document: [id number]

## 2021-05-06 NOTE — LETTER
5/6/2021    Fabiola Mahmood MD  [address] [place] [address]    RE: Shelley Mccrary       Dear Colleague,    I had the pleasure of seeing Shelley Mccrary in the [facility].    Please see separate dictation for HPI, PHYSICAL EXAM AND IMPRESSION/PLAN.    CURRENT MEDICATIONS:  Current Outpatient Medications   Medication Sig Dispense Refill     ALPRAZolam (XANAX) 0.25 MG tablet Take 1 tablet (0.25 mg) by mouth 3 times daily as needed for anxiety 10 tablet 0     aspirin 81 MG tablet Take by mouth daily       atorvastatin (LIPITOR) 40 MG tablet Take 1 tablet (40 mg) by mouth daily 90 tablet 3     clobetasol (TEMOVATE) 0.05 % cream   2     diphenhydrAMINE HCl (BENADRYL PO)        meclizine (ANTIVERT) 25 MG tablet Take 1 tablet (25 mg) by mouth 2 times daily as needed for dizziness 14 tablet 0     metoprolol succinate ER (TOPROL-XL) 25 MG 24 hr tablet Take 1 tablet (25 mg) by mouth daily 90 tablet 1     Multiple Vitamin (MULTI-VITAMINS) TABS Take 1 tablet by mouth       nitroglycerin (NITROSTAT) 0.4 MG SL tablet Place 1 tablet (0.4 mg) under the tongue every 5 minutes as needed for chest pain If you are still having symptoms after 3 doses (15 minutes) call 911. 25 tablet 3     Omega-3 Fatty Acids (OMEGA-3 FISH OIL PO) Take 2 g by mouth daily       phenazopyridine (PYRIDIUM) 100 MG tablet Take 1 tablet (100 mg) by mouth 3 times daily as needed for urinary tract discomfort 10 tablet 0     sertraline (ZOLOFT) 25 MG tablet TAKE 1 TABLET(25 MG) BY MOUTH DAILY 90 tablet 0     MELATONIN PO Take 1 mg by mouth At Bedtime         ALLERGIES:     Allergies   Allergen Reactions     Hmg-Coa-R Inhibitors Other (See Comments)     elevated liver enzymes       PAST MEDICAL HISTORY:  Past Medical History:   Diagnosis Date     Anxiety     post MI     CAD (coronary artery disease) 7/29/2015     Depression     post MI     MRSA infection     hand      Myocardial  infarction (H) 2015     Vertigo     related miners        PAST SURGICAL HISTORY:  Past Surgical History:   Procedure Laterality Date     ANGIOPLASTY  2015    MAURISIO to mid circ, thrombectomy -MAURISIO to proximal, mid, distal RCA , successful balloon to 1st RPL done Resolute stents used procedure done at [facility]     INNER EAR SURGERY      redesign [facility] of [facility] [place]        SOCIAL HISTORY:  Social History     Socioeconomic History     Marital status:      Spouse name: None     Number of children: None     Years of education: None     Highest education level: None   Occupational History     None   Social Needs     Financial resource strain: None     Food insecurity     Worry: None     Inability: None     Transportation needs     Medical: None     Non-medical: None   Tobacco Use     Smoking status: Former Smoker     Types: Cigarettes     Quit date: 1990     Years since quittin.3     Smokeless tobacco: Never Used     Tobacco comment: smoked 1-2 pack week, 30 years   Substance and Sexual Activity     Alcohol use: No     Alcohol/week: 0.0 standard drinks     Drug use: No     Sexual activity: Never   Lifestyle     Physical activity     Days per week: None     Minutes per session: None     Stress: None   Relationships     Social connections     Talks on phone: None     Gets together: None     Attends Church service: None     Active member of club or organization: None     Attends meetings of clubs or organizations: None     Relationship status: None     Intimate partner violence     Fear of current or ex partner: None     Emotionally abused: None     Physically abused: None     Forced sexual activity: None   Other Topics Concern     Parent/sibling w/ CABG, MI or angioplasty before 65F 55M? Yes      Service Not Asked     Blood Transfusions Not Asked     Caffeine Concern No     Occupational Exposure Not Asked     Hobby Hazards Not Asked     Sleep Concern No     Stress Concern Not  Asked     Weight Concern No     Special Diet Yes     Comment: low fats     Back Care Not Asked     Exercise Yes     Comment: walking  miles day, swimming, ellyptical     Bike Helmet Not Asked     Seat Belt Yes     Self-Exams Not Asked   Social History Narrative    , 2 kids, non smoker quit 22 yrs ago. No alcohol social occasional , works for a company to help elderly patient        FAMILY HISTORY:  Family History   Problem Relation Age of Onset     Coronary Artery Disease Father         at age 39, and other heart problem      Hyperlipidemia Father         ?      Hypertension Mother      Diabetes No family hx of      Cerebrovascular Disease No family hx of      Breast Cancer No family hx of      Colon Cancer No family hx of        Review of Systems:  Skin:  Negative       Eyes:  Positive for glasses muscle twitching in eyelid, eye discomfort  ENT:  Positive for hearing loss;vertigo    Respiratory:  Negative       Cardiovascular:    Positive for;palpitations;lightheadedness(worse with anxiety) currently having palpations with lightheadedness  Gastroenterology: Negative poor appetite    Genitourinary:  Negative nocturia    Musculoskeletal:  Negative back pain    Neurologic:  Positive for numbness or tingling of hands abdomen numbness  Psychiatric:  Negative anxiety    Heme/Lymph/Imm:  Negative easy bruising    Endocrine:  Negative         Reviewed. Remainder of the note dictated.    Eliza Santana PA-C    Service Date: 05/06/2021    Shelley is a pleasant 71-year-old female who called into the office earlier today with concerns of elevated blood pressure.  She was added on to my schedule today.  Today is the first time I am meeting the patient.    Her cardiovascular history includes coronary artery disease with history of inferolateral myocardial infarction in 07/2015, at which time she underwent RCA stenting and posterolateral branch angioplasty at [facility].  She has followed closely with Dr. Lovett  since that time.    Shelley states starting about 2 weeks ago, she noted a pressure in her ears.  Initially, she just thought that she had cerumen impaction and went to her primary care provider and had her ears washed out, but her symptoms continued.  Subsequent to this, she also developed twitching in her right eye.  Along with these symptoms, she noted that her blood pressure has been intermittently elevated.  Yesterday, she took her blood pressure and it was elevated and she was quite concerned about this.  She rechecked it and her blood pressure was even higher, which made her more concerned.  She then developed a somewhat flushed feeling from head to toes.  She states her systolic blood pressure went as high as 180.  She talked with her daughter over the phone and was able to calm down and her symptoms slowly resolved.  Due to this episode, she called into the clinic earlier today and talked with Dr. Lovett's nurse who added her on to my schedule today.    She denies any recent medication or dietary changes prior to the initiation of her symptoms, although does state that she recently restarted Zoloft due to the anxiety she is experiencing because of the other symptoms.  She was also in her primary care provider's office 2 days ago and was prescribed Xanax and given a refill of meclizine.    She denies any symptoms such as chest discomfort or dyspnea on exertion.  She continues to remain quite active, walking 2-4 miles on a regular basis without any cardiovascular symptoms or limitations.  She last had an exercise nuclear stress test in 09/2019.  This showed an area of infarction consistent with her prior MI without any ischemia, preserved LV systolic function.    CURRENT CARDIAC MEDICATIONS:  Aspirin 81 mg daily, atorvastatin 40 mg daily, Toprol-XL 25 mg daily (she takes this in the evening), sublingual nitro p.r.n.    The remainder of her medications, allergies, and review of systems reviewed and as are  documented separately.    PHYSICAL EXAMINATION:    GENERAL:  The patient is a pleasant 71-year-old female who appears her stated age.  She is in no apparent distress.  She does appear a little bit anxious today.  VITAL SIGNS:  Her blood pressure 158/92, pulse 92, weight is 139 pounds, which is stable.  RESPIRATORY:  Breathing is nonlabored.  Lungs are clear to auscultation.  CARDIAC:  Reveals a regular rate and rhythm.  I do not appreciate any significant murmur.  EXTREMITIES:  Lower extremities show no evidence of edema.  NEUROLOGIC:  Alert and oriented.    DATA REVIEWED:  The patient had blood work in 12/2021.  Total cholesterol is 154, HDL 67, LDL 77, triglycerides of 52.  Sodium was 138, potassium is 4.4, BUN 16, creatinine stable at 0.81.  ALT was within normal limits.    ASSESSMENT AND PLAN:  51-year-old female with history of coronary artery disease and prior myocardial infarction for which she underwent stenting to the RCA and angioplasty of the posterolateral branch, who presents with elevated blood pressure readings.  She has also been having a somewhat full feeling in her ears and some twitching of her eye.  It is difficult to say that these are from the hypertension.    1.  HTN. I do see in prior notes from Dr. Lovett that in the past she had been on lisinopril but apparently had some lower blood pressure readings and it was stopped.  It does not look like she had any particular intolerance to the medication.  I suggested that we restart a low dose at 5 mg daily.  She was agreeable to this.  We will plan to repeat a basic metabolic panel in a couple of weeks.  I will also check her hemoglobin and TSH at that time just to make sure nothing new is going on.  We discussed that most likely this is just an age-related process.  She is not having any anginal symptoms and remains quite active.  Additionally, she had a normal stress test about 18 months ago.  Therefore, I do not feel that she needs any further  cardiac testing at this particular time. There likely is also an anxiety component.   2.  Coronary artery disease as detailed above.  She remains on aspirin, statin, and beta blocker therapy.  Again, no recent anginal symptoms.  3.  Hyperlipidemia.  Last lipids looked good.  Continue current statin therapy.    I have asked her to follow up with me in 2-3 weeks with basic metabolic panel, hemoglobin and TSH prior to that office visit.  Of course, I encouraged her to contact me sooner with questions or concerns.    35 minutes spent on the date of the encounter with chart review, review of prior testing, patient visit and documentation.    Eliza Santana PA-C        D: 2021   T: 2021   MT: yadira    Name:     JAYLA PISANO  MRN:      [id number]        Account:      [id number]   :      1950           Service Date: 2021       Document: [id number]    Thank you for allowing me to participate in the care of your patient.      Sincerely,     Eliza Santana PA-C     [facility]    cc:   No referring provider defined for this encounter.

## 2021-05-06 NOTE — PATIENT INSTRUCTIONS
Thank you for your [facility] visit today. Your provider has recommended the following:  Medication Changes:  Start lisinopril 5mg once a day  Recommendations:  Lab work (you no not need to fast) in a couple weeks  Follow-up:  See Eliza JACOBSON for cardiology follow up in 2 weeks.    Reminder:  Please bring in your current medication list or your medication, over the counter supplements and vitamin bottles as we will review these at each office visit.

## 2021-05-06 NOTE — TELEPHONE ENCOUNTER
Pt called in x 3 c/o spiking BP to 180 systolic feeling flushed and painicked. Pt sound fine on phone this AM with no CP, arm pain denies any symptoms regarding heart issues. Did give Pt OV as requested 200 pm today. JUAN ALBERTO Oscar RN

## 2021-05-06 NOTE — PROGRESS NOTES
Please see separate dictation for HPI, PHYSICAL EXAM AND IMPRESSION/PLAN.    CURRENT MEDICATIONS:  Current Outpatient Medications   Medication Sig Dispense Refill     ALPRAZolam (XANAX) 0.25 MG tablet Take 1 tablet (0.25 mg) by mouth 3 times daily as needed for anxiety 10 tablet 0     aspirin 81 MG tablet Take by mouth daily       atorvastatin (LIPITOR) 40 MG tablet Take 1 tablet (40 mg) by mouth daily 90 tablet 3     clobetasol (TEMOVATE) 0.05 % cream   2     diphenhydrAMINE HCl (BENADRYL PO)        meclizine (ANTIVERT) 25 MG tablet Take 1 tablet (25 mg) by mouth 2 times daily as needed for dizziness 14 tablet 0     metoprolol succinate ER (TOPROL-XL) 25 MG 24 hr tablet Take 1 tablet (25 mg) by mouth daily 90 tablet 1     Multiple Vitamin (MULTI-VITAMINS) TABS Take 1 tablet by mouth       nitroglycerin (NITROSTAT) 0.4 MG SL tablet Place 1 tablet (0.4 mg) under the tongue every 5 minutes as needed for chest pain If you are still having symptoms after 3 doses (15 minutes) call 911. 25 tablet 3     Omega-3 Fatty Acids (OMEGA-3 FISH OIL PO) Take 2 g by mouth daily       phenazopyridine (PYRIDIUM) 100 MG tablet Take 1 tablet (100 mg) by mouth 3 times daily as needed for urinary tract discomfort 10 tablet 0     sertraline (ZOLOFT) 25 MG tablet TAKE 1 TABLET(25 MG) BY MOUTH DAILY 90 tablet 0     MELATONIN PO Take 1 mg by mouth At Bedtime         ALLERGIES:     Allergies   Allergen Reactions     Hmg-Coa-R Inhibitors Other (See Comments)     elevated liver enzymes       PAST MEDICAL HISTORY:  Past Medical History:   Diagnosis Date     Anxiety     post MI     CAD (coronary artery disease) 7/29/2015     Depression     post MI     MRSA infection     hand      Myocardial infarction (H) 7/2015     Vertigo     related miners        PAST SURGICAL HISTORY:  Past Surgical History:   Procedure Laterality Date     ANGIOPLASTY  7/17/2015    MAURISIO to mid circ, thrombectomy -MAURISIO to proximal, mid, distal RCA , successful balloon to 1st RPL  done Resolute stents used procedure done at [facility]     INNER EAR SURGERY      redesign [facility] [employer] [place]        SOCIAL HISTORY:  Social History     Socioeconomic History     Marital status:      Spouse name: None     Number of children: None     Years of education: None     Highest education level: None   Occupational History     None   Social Needs     Financial resource strain: None     Food insecurity     Worry: None     Inability: None     Transportation needs     Medical: None     Non-medical: None   Tobacco Use     Smoking status: Former Smoker     Types: Cigarettes     Quit date: 1990     Years since quittin.3     Smokeless tobacco: Never Used     Tobacco comment: smoked 1-2 pack week, 30 years   Substance and Sexual Activity     Alcohol use: No     Alcohol/week: 0.0 standard drinks     Drug use: No     Sexual activity: Never   Lifestyle     Physical activity     Days per week: None     Minutes per session: None     Stress: None   Relationships     Social connections     Talks on phone: None     Gets together: None     Attends Christian service: None     Active member of club or organization: None     Attends meetings of clubs or organizations: None     Relationship status: None     Intimate partner violence     Fear of current or ex partner: None     Emotionally abused: None     Physically abused: None     Forced sexual activity: None   Other Topics Concern     Parent/sibling w/ CABG, MI or angioplasty before 65F 55M? Yes      Service Not Asked     Blood Transfusions Not Asked     Caffeine Concern No     Occupational Exposure Not Asked     Hobby Hazards Not Asked     Sleep Concern No     Stress Concern Not Asked     Weight Concern No     Special Diet Yes     Comment: low fats     Back Care Not Asked     Exercise Yes     Comment: walking  miles day, swimming, ellyptical     Bike Helmet Not Asked     Seat Belt Yes     Self-Exams Not Asked   Social History  Narrative    , 2 kids, non smoker quit 22 yrs ago. No alcohol social occasional , works for a company to help elderly patient        FAMILY HISTORY:  Family History   Problem Relation Age of Onset     Coronary Artery Disease Father         at age 39, and other heart problem      Hyperlipidemia Father         ?      Hypertension Mother      Diabetes No family hx of      Cerebrovascular Disease No family hx of      Breast Cancer No family hx of      Colon Cancer No family hx of        Review of Systems:  Skin:  Negative       Eyes:  Positive for glasses muscle twitching in eyelid, eye discomfort  ENT:  Positive for hearing loss;vertigo    Respiratory:  Negative       Cardiovascular:    Positive for;palpitations;lightheadedness(worse with anxiety) currently having palpations with lightheadedness  Gastroenterology: Negative poor appetite    Genitourinary:  Negative nocturia    Musculoskeletal:  Negative back pain    Neurologic:  Positive for numbness or tingling of hands abdomen numbness  Psychiatric:  Negative anxiety    Heme/Lymph/Imm:  Negative easy bruising    Endocrine:  Negative         Reviewed. Remainder of the note dictated.    Eliza Santana PA-C

## 2021-05-07 DIAGNOSIS — I10 BENIGN ESSENTIAL HYPERTENSION: ICD-10-CM

## 2021-05-07 RX ORDER — LISINOPRIL 5 MG/1
5 TABLET ORAL DAILY
Qty: 90 TABLET | Refills: 0 | Status: SHIPPED | OUTPATIENT
Start: 2021-05-07 | End: 2021-05-26

## 2021-05-25 DIAGNOSIS — I10 BENIGN ESSENTIAL HYPERTENSION: ICD-10-CM

## 2021-05-25 LAB
ANION GAP SERPL CALCULATED.3IONS-SCNC: 7 MMOL/L (ref 3–14)
BUN SERPL-MCNC: 15 MG/DL (ref 7–30)
CALCIUM SERPL-MCNC: 9.3 MG/DL (ref 8.5–10.1)
CHLORIDE SERPL-SCNC: 105 MMOL/L (ref 94–109)
CO2 SERPL-SCNC: 27 MMOL/L (ref 20–32)
CREAT SERPL-MCNC: 0.8 MG/DL (ref 0.52–1.04)
GFR SERPL CREATININE-BSD FRML MDRD: 73 ML/MIN/{1.73_M2}
GLUCOSE SERPL-MCNC: 79 MG/DL (ref 70–99)
HGB BLD-MCNC: 13.5 G/DL (ref 11.7–15.7)
POTASSIUM SERPL-SCNC: 4 MMOL/L (ref 3.4–5.3)
SODIUM SERPL-SCNC: 139 MMOL/L (ref 133–144)
TSH SERPL DL<=0.005 MIU/L-ACNC: 0.79 MU/L (ref 0.4–4)

## 2021-05-25 PROCEDURE — 80048 BASIC METABOLIC PNL TOTAL CA: CPT | Performed by: PHYSICIAN ASSISTANT

## 2021-05-25 PROCEDURE — 84443 ASSAY THYROID STIM HORMONE: CPT | Performed by: PHYSICIAN ASSISTANT

## 2021-05-25 PROCEDURE — 85018 HEMOGLOBIN: CPT | Performed by: PHYSICIAN ASSISTANT

## 2021-05-25 PROCEDURE — 36415 COLL VENOUS BLD VENIPUNCTURE: CPT | Performed by: PHYSICIAN ASSISTANT

## 2021-05-26 ENCOUNTER — OFFICE VISIT (OUTPATIENT)
Dept: CARDIOLOGY | Facility: CLINIC | Age: 71
End: 2021-05-26
Attending: PHYSICIAN ASSISTANT
Payer: MEDICARE

## 2021-05-26 VITALS
SYSTOLIC BLOOD PRESSURE: 128 MMHG | WEIGHT: 139.3 LBS | DIASTOLIC BLOOD PRESSURE: 72 MMHG | BODY MASS INDEX: 26.3 KG/M2 | HEART RATE: 80 BPM | HEIGHT: 61 IN

## 2021-05-26 DIAGNOSIS — E78.5 HYPERLIPIDEMIA LDL GOAL <70: ICD-10-CM

## 2021-05-26 DIAGNOSIS — I10 BENIGN ESSENTIAL HYPERTENSION: Primary | ICD-10-CM

## 2021-05-26 DIAGNOSIS — I25.10 CORONARY ARTERY DISEASE INVOLVING NATIVE CORONARY ARTERY OF NATIVE HEART WITHOUT ANGINA PECTORIS: ICD-10-CM

## 2021-05-26 PROCEDURE — 99214 OFFICE O/P EST MOD 30 MIN: CPT | Performed by: PHYSICIAN ASSISTANT

## 2021-05-26 RX ORDER — LISINOPRIL 5 MG/1
5 TABLET ORAL DAILY
Qty: 90 TABLET | Refills: 3 | Status: SHIPPED | OUTPATIENT
Start: 2021-05-26 | End: 2022-06-02

## 2021-05-26 ASSESSMENT — MIFFLIN-ST. JEOR: SCORE: 1074.24

## 2021-05-26 NOTE — PROGRESS NOTES
Please see separate dictation for HPI, PHYSICAL EXAM AND IMPRESSION/PLAN.    CURRENT MEDICATIONS:  Current Outpatient Medications   Medication Sig Dispense Refill     ALPRAZolam (XANAX) 0.25 MG tablet Take 1 tablet (0.25 mg) by mouth 3 times daily as needed for anxiety 10 tablet 0     aspirin 81 MG tablet Take by mouth daily       atorvastatin (LIPITOR) 40 MG tablet Take 1 tablet (40 mg) by mouth daily 90 tablet 3     clobetasol (TEMOVATE) 0.05 % cream Apply topically as needed   2     diphenhydrAMINE HCl (BENADRYL PO) Take by mouth as needed        lisinopril (ZESTRIL) 5 MG tablet Take 1 tablet (5 mg) by mouth daily 90 tablet 0     meclizine (ANTIVERT) 25 MG tablet Take 1 tablet (25 mg) by mouth 2 times daily as needed for dizziness 14 tablet 0     metoprolol succinate ER (TOPROL-XL) 25 MG 24 hr tablet Take 1 tablet (25 mg) by mouth daily 90 tablet 1     Multiple Vitamin (MULTI-VITAMINS) TABS Take 1 tablet by mouth daily        nitroglycerin (NITROSTAT) 0.4 MG SL tablet Place 1 tablet (0.4 mg) under the tongue every 5 minutes as needed for chest pain If you are still having symptoms after 3 doses (15 minutes) call 911. 25 tablet 3     Omega-3 Fatty Acids (OMEGA-3 FISH OIL PO) Take 2 g by mouth daily       MELATONIN PO Take 1 mg by mouth At Bedtime       phenazopyridine (PYRIDIUM) 100 MG tablet Take 1 tablet (100 mg) by mouth 3 times daily as needed for urinary tract discomfort (Patient not taking: Reported on 5/26/2021) 10 tablet 0     sertraline (ZOLOFT) 25 MG tablet TAKE 1 TABLET(25 MG) BY MOUTH DAILY (Patient not taking: Reported on 5/26/2021) 90 tablet 0       ALLERGIES:     Allergies   Allergen Reactions     Hmg-Coa-R Inhibitors Other (See Comments)     elevated liver enzymes       PAST MEDICAL HISTORY:  Past Medical History:   Diagnosis Date     Anxiety     post MI     CAD (coronary artery disease) 7/29/2015     Depression     post MI     MRSA infection     hand      Myocardial infarction (H) 7/2015      Vertigo     related miners        PAST SURGICAL HISTORY:  Past Surgical History:   Procedure Laterality Date     ANGIOPLASTY  2015    MAURISIO to mid circ, thrombectomy -MAURISIO to proximal, mid, distal RCA , successful balloon to 1st RPL done Resolute stents used procedure done at [facility]     INNER EAR SURGERY      redesign merritt faye of miners ismael        SOCIAL HISTORY:  Social History     Socioeconomic History     Marital status:      Spouse name: None     Number of children: None     Years of education: None     Highest education level: None   Occupational History     None   Social Needs     Financial resource strain: None     Food insecurity     Worry: None     Inability: None     Transportation needs     Medical: None     Non-medical: None   Tobacco Use     Smoking status: Former Smoker     Types: Cigarettes     Quit date: 1990     Years since quittin.4     Smokeless tobacco: Never Used     Tobacco comment: smoked 1-2 pack week, 30 years   Substance and Sexual Activity     Alcohol use: No     Alcohol/week: 0.0 standard drinks     Drug use: No     Sexual activity: Never   Lifestyle     Physical activity     Days per week: None     Minutes per session: None     Stress: None   Relationships     Social connections     Talks on phone: None     Gets together: None     Attends Taoist service: None     Active member of club or organization: None     Attends meetings of clubs or organizations: None     Relationship status: None     Intimate partner violence     Fear of current or ex partner: None     Emotionally abused: None     Physically abused: None     Forced sexual activity: None   Other Topics Concern     Parent/sibling w/ CABG, MI or angioplasty before 65F 55M? Yes      Service Not Asked     Blood Transfusions Not Asked     Caffeine Concern No     Occupational Exposure Not Asked     Hobby Hazards Not Asked     Sleep Concern No     Stress Concern Not Asked     Weight Concern  No     Special Diet Yes     Comment: low fats     Back Care Not Asked     Exercise Yes     Comment: walking  miles day, swimming, ellyptical     Bike Helmet Not Asked     Seat Belt Yes     Self-Exams Not Asked   Social History Narrative    , 2 kids, non smoker quit 22 yrs ago. No alcohol social occasional , works for a company to help elderly patient        FAMILY HISTORY:  Family History   Problem Relation Age of Onset     Coronary Artery Disease Father         at age 39, and other heart problem      Hyperlipidemia Father         ?      Hypertension Mother      Diabetes No family hx of      Cerebrovascular Disease No family hx of      Breast Cancer No family hx of      Colon Cancer No family hx of        Review of Systems:  Skin:  Negative       Eyes:    glasses menieres disease  ENT:  Positive for vertigo;hearing loss;tinnitus ear crystals per pt  Respiratory:  Negative       Cardiovascular:  Negative      Gastroenterology: Negative      Genitourinary:  Negative      Musculoskeletal:  Negative      Neurologic:  Positive for numbness or tingling of hands    Psychiatric:  Positive for excessive stress    Heme/Lymph/Imm:  Positive for allergies    Endocrine:  Negative         Reviewed. Remainder of the note dictated.    Eliza Santana PA-C

## 2021-05-26 NOTE — PATIENT INSTRUCTIONS
Thank you for your [facility] visit today. Your provider has recommended the following:  Medication Changes:  No changes. Stay on the lisinopril  Recommendations:  Nothing new   Follow-up:  See Dr Lovett for cardiology follow up in summer 2022.    Reminder:  Please bring in your current medication list or your medication, over the counter supplements and vitamin bottles as we will review these at each office visit.

## 2021-05-26 NOTE — PROGRESS NOTES
Service Date: 05/26/2021    REASON FOR VISIT:  Shelley is a pleasant 71-year-old female who presents to the office today for hypertension followup.    HISTORY OF PRESENT ILLNESS: Her cardiovascular history includes coronary artery disease with history of inferolateral myocardial infarction in 07/2015, at which time she underwent drug-eluting stent placement to the RCA and posterolateral branch angioplasty at [facility].  She also has hypertension and hyperlipidemia.  She has followed closely with Dr. Lovett.    Again, I met her earlier this month after she had called in with concerns of intermittently elevated blood pressure readings.  At that time, she had been noting some pressure in her ears and also some twitching in her eyes.  These symptoms were quite bothersome to her.  She did check her blood pressure and it was somewhat elevated, which concerned her further and then subsequent blood pressure readings were even more elevated.  She called into the office and had been added on to my schedule as a same-day visit.  At that time, her blood pressure was elevated in the office.  I had noted in the past that she had been on lisinopril, but it had been stopped due to some lower blood pressure readings.  I asked her to restart lisinopril 5 mg daily and have some basic blood work and a followup today.    Since our last visit, the patient states that she has been feeling much better.  She believes that her prior symptoms were related to Meniers and anxiety.  She states her blood pressures have been good at home and questions if she needs to remain on the lisinopril.  When I asked her for specific blood pressure numbers, she states that her systolic blood pressure has been around 110.  She is not having any symptoms such as lightheadedness.    CURRENT CARDIAC MEDICATIONS:    1.  Aspirin 81 mg daily.  2.  Atorvastatin 40 mg daily.  3.  Lisinopril 5 mg daily.  4.  Toprol-XL 25 mg daily.  5.  Sublingual nitro p.r.n.    The  remainder of her medications, allergies, and review of systems are reviewed and as are documented separately.    DATA REVIEWED:  She had a basic metabolic panel yesterday.  Sodium was 139, potassium 4.0, BUN is 15, creatinine 0.8, which is stable.  TSH is within normal limits, as is her hemoglobin.    ASSESSMENT AND PLAN:  A 71-year-old female with history of hypertension, hyperlipidemia and coronary artery disease with prior myocardial infarction and stenting to the RCA along with angioplasty of the posterolateral branch, who recently presented with elevated blood pressure readings and likely noncardiac symptoms.  I started her on lisinopril and she is here for followup.    1.  Hypertension.  She did question today if she needs to remain on lisinopril and I explained to her that her blood pressure is optimally controlled with the lisinopril and since she is tolerating this, I would recommend continuing it.  She was agreeable to this.  Again, her recent followup basic metabolic panel is stable.  2.  Coronary artery disease as detailed above.  She remains on aspirin, statin and beta blocker therapy.  No anginal symptoms.  3.  Hyperlipidemia.  Last lipids looked good.  Continue on current statin therapy.    Overall, she seems to be doing well from a cardiac standpoint.  I recommended continuing her current cardiac medications.      She should follow up in the Cardiology office in about a year for her annual followup with Dr. Lovett.  Of course, I encouraged her to contact us sooner with questions or concerns.    Eliza Santana PA-C        D: 2021   T: 2021   MTSlim MOCK    Name:     JAYLA PISANO  MRN:      [id number]        Account:      [id number]   :      1950           Service Date: 2021       Document: [id number]

## 2021-05-26 NOTE — LETTER
5/26/2021    Fabiola Mahmood MD  [address] [place] [address]    RE: Shelley Mccrary       Dear Colleague,    I had the pleasure of seeing Shelley Mccrary in the [facility].    Please see separate dictation for HPI, PHYSICAL EXAM AND IMPRESSION/PLAN.    CURRENT MEDICATIONS:  Current Outpatient Medications   Medication Sig Dispense Refill     ALPRAZolam (XANAX) 0.25 MG tablet Take 1 tablet (0.25 mg) by mouth 3 times daily as needed for anxiety 10 tablet 0     aspirin 81 MG tablet Take by mouth daily       atorvastatin (LIPITOR) 40 MG tablet Take 1 tablet (40 mg) by mouth daily 90 tablet 3     clobetasol (TEMOVATE) 0.05 % cream Apply topically as needed   2     diphenhydrAMINE HCl (BENADRYL PO) Take by mouth as needed        lisinopril (ZESTRIL) 5 MG tablet Take 1 tablet (5 mg) by mouth daily 90 tablet 0     meclizine (ANTIVERT) 25 MG tablet Take 1 tablet (25 mg) by mouth 2 times daily as needed for dizziness 14 tablet 0     metoprolol succinate ER (TOPROL-XL) 25 MG 24 hr tablet Take 1 tablet (25 mg) by mouth daily 90 tablet 1     Multiple Vitamin (MULTI-VITAMINS) TABS Take 1 tablet by mouth daily        nitroglycerin (NITROSTAT) 0.4 MG SL tablet Place 1 tablet (0.4 mg) under the tongue every 5 minutes as needed for chest pain If you are still having symptoms after 3 doses (15 minutes) call 911. 25 tablet 3     Omega-3 Fatty Acids (OMEGA-3 FISH OIL PO) Take 2 g by mouth daily       MELATONIN PO Take 1 mg by mouth At Bedtime       phenazopyridine (PYRIDIUM) 100 MG tablet Take 1 tablet (100 mg) by mouth 3 times daily as needed for urinary tract discomfort (Patient not taking: Reported on 5/26/2021) 10 tablet 0     sertraline (ZOLOFT) 25 MG tablet TAKE 1 TABLET(25 MG) BY MOUTH DAILY (Patient not taking: Reported on 5/26/2021) 90 tablet 0       ALLERGIES:     Allergies   Allergen Reactions     Hmg-Coa-R Inhibitors Other (See Comments)      elevated liver enzymes       PAST MEDICAL HISTORY:  Past Medical History:   Diagnosis Date     Anxiety     post MI     CAD (coronary artery disease) 2015     Depression     post MI     MRSA infection     hand      Myocardial infarction (H) 2015     Vertigo     related miners        PAST SURGICAL HISTORY:  Past Surgical History:   Procedure Laterality Date     ANGIOPLASTY  2015    MAURISIO to mid circ, thrombectomy -MAURISIO to proximal, mid, distal RCA , successful balloon to 1st RPL done Resolute stents used procedure done at [facility]     INNER EAR SURGERY      redesign roxann nieto bc of miners diseas        SOCIAL HISTORY:  Social History     Socioeconomic History     Marital status:      Spouse name: None     Number of children: None     Years of education: None     Highest education level: None   Occupational History     None   Social Needs     Financial resource strain: None     Food insecurity     Worry: None     Inability: None     Transportation needs     Medical: None     Non-medical: None   Tobacco Use     Smoking status: Former Smoker     Types: Cigarettes     Quit date: 1990     Years since quittin.4     Smokeless tobacco: Never Used     Tobacco comment: smoked 1-2 pack week, 30 years   Substance and Sexual Activity     Alcohol use: No     Alcohol/week: 0.0 standard drinks     Drug use: No     Sexual activity: Never   Lifestyle     Physical activity     Days per week: None     Minutes per session: None     Stress: None   Relationships     Social connections     Talks on phone: None     Gets together: None     Attends Yazidi service: None     Active member of club or organization: None     Attends meetings of clubs or organizations: None     Relationship status: None     Intimate partner violence     Fear of current or ex partner: None     Emotionally abused: None     Physically abused: None     Forced sexual activity: None   Other Topics Concern     Parent/sibling w/ CABG, MI  or angioplasty before 65F 55M? Yes      Service Not Asked     Blood Transfusions Not Asked     Caffeine Concern No     Occupational Exposure Not Asked     Hobby Hazards Not Asked     Sleep Concern No     Stress Concern Not Asked     Weight Concern No     Special Diet Yes     Comment: low fats     Back Care Not Asked     Exercise Yes     Comment: walking  miles day, swimming, ellyptical     Bike Helmet Not Asked     Seat Belt Yes     Self-Exams Not Asked   Social History Narrative    , 2 kids, non smoker quit 22 yrs ago. No alcohol social occasional , works for a company to help elderly patient        FAMILY HISTORY:  Family History   Problem Relation Age of Onset     Coronary Artery Disease Father         at age 39, and other heart problem      Hyperlipidemia Father         ?      Hypertension Mother      Diabetes No family hx of      Cerebrovascular Disease No family hx of      Breast Cancer No family hx of      Colon Cancer No family hx of        Review of Systems:  Skin:  Negative       Eyes:    glasses menieres disease  ENT:  Positive for vertigo;hearing loss;tinnitus ear crystals per pt  Respiratory:  Negative       Cardiovascular:  Negative      Gastroenterology: Negative      Genitourinary:  Negative      Musculoskeletal:  Negative      Neurologic:  Positive for numbness or tingling of hands    Psychiatric:  Positive for excessive stress    Heme/Lymph/Imm:  Positive for allergies    Endocrine:  Negative         Reviewed. Remainder of the note dictated.    Eliza Santana PA-C    Service Date: 05/26/2021    REASON FOR VISIT:  Shelley is a pleasant 71-year-old female who presents to the office today for hypertension followup.    HISTORY OF PRESENT ILLNESS: Her cardiovascular history includes coronary artery disease with history of inferolateral myocardial infarction in 07/2015, at which time she underwent drug-eluting stent placement to the RCA and posterolateral branch angioplasty at [facility].  She also has hypertension and hyperlipidemia.  She has followed closely with Dr. Lovett.    Again, I met her earlier this month after she had called in with concerns of intermittently elevated blood pressure readings.  At that time, she had been noting some pressure in her ears and also some twitching in her eyes.  These symptoms were quite bothersome to her.  She did check her blood pressure and it was somewhat elevated, which concerned her further and then subsequent blood pressure readings were even more elevated.  She called into the office and had been added on to my schedule as a same-day visit.  At that time, her blood pressure was elevated in the office.  I had noted in the past that she had been on lisinopril, but it had been stopped due to some lower blood pressure readings.  I asked her to restart lisinopril 5 mg daily and have some basic blood work and a followup today.    Since our last visit, the patient states that she has been feeling much better.  She believes that her prior symptoms were related to Meniers and anxiety.  She states her blood pressures have been good at home and questions if she needs to remain on the lisinopril.  When I asked her for specific blood pressure numbers, she states that her systolic blood pressure has been around 110.  She is not having any symptoms such as lightheadedness.    CURRENT CARDIAC MEDICATIONS:    1.  Aspirin 81 mg daily.  2.  Atorvastatin 40 mg daily.  3.  Lisinopril 5 mg daily.  4.  Toprol-XL 25 mg daily.  5.  Sublingual nitro p.r.n.    The remainder of her medications, allergies, and review of systems are reviewed and as are documented separately.    DATA REVIEWED:  She had a basic metabolic panel yesterday.  Sodium was 139, potassium 4.0, BUN is 15, creatinine 0.8, which is stable.  TSH is within normal limits, as is her hemoglobin.    ASSESSMENT AND PLAN:  A 71-year-old female with history of hypertension, hyperlipidemia and coronary artery  disease with prior myocardial infarction and stenting to the RCA along with angioplasty of the posterolateral branch, who recently presented with elevated blood pressure readings and likely noncardiac symptoms.  I started her on lisinopril and she is here for followup.    1.  Hypertension.  She did question today if she needs to remain on lisinopril and I explained to her that her blood pressure is optimally controlled with the lisinopril and since she is tolerating this, I would recommend continuing it.  She was agreeable to this.  Again, her recent followup basic metabolic panel is stable.  2.  Coronary artery disease as detailed above.  She remains on aspirin, statin and beta blocker therapy.  No anginal symptoms.  3.  Hyperlipidemia.  Last lipids looked good.  Continue on current statin therapy.    Overall, she seems to be doing well from a cardiac standpoint.  I recommended continuing her current cardiac medications.      She should follow up in the Cardiology office in about a year for her annual followup with Dr. Lovett.  Of course, I encouraged her to contact us sooner with questions or concerns.    Eliza Santana PA-C        D: 2021   T: 2021   MT: NISH    Name:     JAYLA PISANO  MRN:      [id number]        Account:      [id number]   :      1950           Service Date: 2021       Document: [id number]      Thank you for allowing me to participate in the care of your patient.      Sincerely,     Eliza Santana PA-C     [facility]    cc:   Eliza Santana PA-C  [facility]  [address] [place] [address]

## 2021-09-11 ENCOUNTER — HEALTH MAINTENANCE LETTER (OUTPATIENT)
Age: 71
End: 2021-09-11

## 2021-12-02 ENCOUNTER — OFFICE VISIT (OUTPATIENT)
Dept: INTERNAL MEDICINE | Facility: CLINIC | Age: 71
End: 2021-12-02
Payer: MEDICARE

## 2021-12-02 VITALS
OXYGEN SATURATION: 98 % | DIASTOLIC BLOOD PRESSURE: 81 MMHG | WEIGHT: 134.8 LBS | TEMPERATURE: 96.9 F | HEIGHT: 61 IN | BODY MASS INDEX: 25.45 KG/M2 | HEART RATE: 90 BPM | SYSTOLIC BLOOD PRESSURE: 135 MMHG | RESPIRATION RATE: 18 BRPM

## 2021-12-02 DIAGNOSIS — H61.23 BILATERAL IMPACTED CERUMEN: Primary | ICD-10-CM

## 2021-12-02 PROCEDURE — 99213 OFFICE O/P EST LOW 20 MIN: CPT | Performed by: INTERNAL MEDICINE

## 2021-12-02 ASSESSMENT — MIFFLIN-ST. JEOR: SCORE: 1053.83

## 2021-12-02 NOTE — PROGRESS NOTES
"      ASSESSMENT & PLAN:                                                      (H61.23) Bilateral impacted cerumen  (primary encounter diagnosis)  Comment: We have tried unsuccessfully to remove the wax today.  Because of the small auditory canal, I advised her to see ENT, and may be to see them on the regular basis  Plan: Otolaryngology Referral               Chief Complaint:                                                      Ears      SUBJECTIVE:                                                    History of present illness     Patient complains of tingling and decreased hearing in both ears.  No pain  She has history of impacted wax for which she needs removal every year.      The auditory external canals are very small and have impacted wax bilaterally.   The patient gave me verbal consent .  I tried unsuccessfully to remove the wax with a curette.  Then I try with the water with very little results.       OBJECTIVE:                                                    Physical Exam :    Blood pressure 135/81, pulse 90, temperature 96.9  F (36.1  C), temperature source Tympanic, resp. rate 18, height 1.549 m (5' 1\"), weight 61.1 kg (134 lb 12.8 oz), SpO2 98 %, not currently breastfeeding.   NAD, appears comfortable  ears, as above  Past Medical History:   Diagnosis Date     Anxiety     post MI     CAD (coronary artery disease) 7/29/2015     Depression     post MI     MRSA infection     hand      Myocardial infarction (H) 7/2015     Vertigo     related miners       PSHx: reviewed  Past Surgical History:   Procedure Laterality Date     ANGIOPLASTY  7/17/2015    MAURISIO to mid circ, thrombectomy -MAURISIO to proximal, mid, distal RCA , successful balloon to 1st RPL done Resolute stents used procedure done at [facility]     INNER EAR SURGERY      redesign merritt faye of miners diseas         Meds: reviewed  Current Outpatient Medications   Medication Sig Dispense Refill     ALPRAZolam (XANAX) 0.25 MG tablet Take 1 " tablet (0.25 mg) by mouth 3 times daily as needed for anxiety 10 tablet 0     aspirin 81 MG tablet Take by mouth daily       atorvastatin (LIPITOR) 40 MG tablet Take 1 tablet (40 mg) by mouth daily 90 tablet 3     clobetasol (TEMOVATE) 0.05 % cream Apply topically as needed   2     diphenhydrAMINE HCl (BENADRYL PO) Take by mouth as needed        lisinopril (ZESTRIL) 5 MG tablet Take 1 tablet (5 mg) by mouth daily 90 tablet 3     meclizine (ANTIVERT) 25 MG tablet Take 1 tablet (25 mg) by mouth 2 times daily as needed for dizziness 14 tablet 0     MELATONIN PO Take 1 mg by mouth At Bedtime       metoprolol succinate ER (TOPROL-XL) 25 MG 24 hr tablet TAKE 1 TABLET(25 MG) BY MOUTH DAILY 90 tablet 1     Multiple Vitamin (MULTI-VITAMINS) TABS Take 1 tablet by mouth daily        nitroglycerin (NITROSTAT) 0.4 MG SL tablet Place 1 tablet (0.4 mg) under the tongue every 5 minutes as needed for chest pain If you are still having symptoms after 3 doses (15 minutes) call 911. 25 tablet 3     Omega-3 Fatty Acids (OMEGA-3 FISH OIL PO) Take 2 g by mouth daily         Soc Hx: reviewed  Fam Hx: reviewed          Geno Kohler MD  Internal Medicine

## 2021-12-06 ENCOUNTER — DOCUMENTATION ONLY (OUTPATIENT)
Dept: LAB | Facility: CLINIC | Age: 71
End: 2021-12-06
Payer: MEDICARE

## 2021-12-06 DIAGNOSIS — Z13.220 SCREENING FOR HYPERLIPIDEMIA: Primary | ICD-10-CM

## 2021-12-16 ENCOUNTER — OFFICE VISIT (OUTPATIENT)
Dept: FAMILY MEDICINE | Facility: CLINIC | Age: 71
End: 2021-12-16
Payer: MEDICARE

## 2021-12-16 VITALS
SYSTOLIC BLOOD PRESSURE: 132 MMHG | HEART RATE: 79 BPM | OXYGEN SATURATION: 99 % | DIASTOLIC BLOOD PRESSURE: 74 MMHG | TEMPERATURE: 96.3 F

## 2021-12-16 DIAGNOSIS — Z00.00 ROUTINE HISTORY AND PHYSICAL EXAMINATION OF ADULT: ICD-10-CM

## 2021-12-16 DIAGNOSIS — H91.90 DECREASED HEARING, UNSPECIFIED LATERALITY: ICD-10-CM

## 2021-12-16 DIAGNOSIS — I42.8 OTHER CARDIOMYOPATHY (H): ICD-10-CM

## 2021-12-16 DIAGNOSIS — Z13.220 SCREENING FOR HYPERLIPIDEMIA: ICD-10-CM

## 2021-12-16 DIAGNOSIS — H61.23 EXCESSIVE EAR WAX, BILATERAL: Primary | ICD-10-CM

## 2021-12-16 DIAGNOSIS — I10 BENIGN ESSENTIAL HYPERTENSION: ICD-10-CM

## 2021-12-16 DIAGNOSIS — Z12.83 SKIN CANCER SCREENING: ICD-10-CM

## 2021-12-16 DIAGNOSIS — E78.5 HYPERLIPIDEMIA LDL GOAL <70: ICD-10-CM

## 2021-12-16 DIAGNOSIS — Z12.11 SCREENING FOR COLON CANCER: ICD-10-CM

## 2021-12-16 DIAGNOSIS — Z12.31 BREAST CANCER SCREENING BY MAMMOGRAM: ICD-10-CM

## 2021-12-16 DIAGNOSIS — Z23 NEED FOR PROPHYLACTIC VACCINATION AND INOCULATION AGAINST INFLUENZA: ICD-10-CM

## 2021-12-16 LAB
ERYTHROCYTE [DISTWIDTH] IN BLOOD BY AUTOMATED COUNT: 13 % (ref 10–15)
HCT VFR BLD AUTO: 42.8 % (ref 35–47)
HGB BLD-MCNC: 13.9 G/DL (ref 11.7–15.7)
MCH RBC QN AUTO: 30.6 PG (ref 26.5–33)
MCHC RBC AUTO-ENTMCNC: 32.5 G/DL (ref 31.5–36.5)
MCV RBC AUTO: 94 FL (ref 78–100)
PLATELET # BLD AUTO: 259 10E3/UL (ref 150–450)
RBC # BLD AUTO: 4.54 10E6/UL (ref 3.8–5.2)
WBC # BLD AUTO: 5.7 10E3/UL (ref 4–11)

## 2021-12-16 PROCEDURE — 69210 REMOVE IMPACTED EAR WAX UNI: CPT | Performed by: FAMILY MEDICINE

## 2021-12-16 PROCEDURE — G0439 PPPS, SUBSEQ VISIT: HCPCS | Performed by: FAMILY MEDICINE

## 2021-12-16 PROCEDURE — 90662 IIV NO PRSV INCREASED AG IM: CPT | Performed by: FAMILY MEDICINE

## 2021-12-16 PROCEDURE — 80053 COMPREHEN METABOLIC PANEL: CPT | Performed by: FAMILY MEDICINE

## 2021-12-16 PROCEDURE — 80061 LIPID PANEL: CPT | Performed by: FAMILY MEDICINE

## 2021-12-16 PROCEDURE — 85027 COMPLETE CBC AUTOMATED: CPT | Performed by: FAMILY MEDICINE

## 2021-12-16 PROCEDURE — G0008 ADMIN INFLUENZA VIRUS VAC: HCPCS | Performed by: FAMILY MEDICINE

## 2021-12-16 PROCEDURE — 36415 COLL VENOUS BLD VENIPUNCTURE: CPT | Performed by: FAMILY MEDICINE

## 2021-12-16 PROCEDURE — 99214 OFFICE O/P EST MOD 30 MIN: CPT | Mod: 25 | Performed by: FAMILY MEDICINE

## 2021-12-16 RX ORDER — ATORVASTATIN CALCIUM 40 MG/1
40 TABLET, FILM COATED ORAL DAILY
Qty: 90 TABLET | Refills: 3 | Status: SHIPPED | OUTPATIENT
Start: 2021-12-16 | End: 2022-12-16

## 2021-12-16 RX ORDER — METOPROLOL SUCCINATE 25 MG/1
25 TABLET, EXTENDED RELEASE ORAL DAILY
Qty: 90 TABLET | Refills: 1 | Status: SHIPPED | OUTPATIENT
Start: 2021-12-16 | End: 2022-06-13

## 2021-12-16 ASSESSMENT — ANXIETY QUESTIONNAIRES
4. TROUBLE RELAXING: NOT AT ALL
7. FEELING AFRAID AS IF SOMETHING AWFUL MIGHT HAPPEN: SEVERAL DAYS
1. FEELING NERVOUS, ANXIOUS, OR ON EDGE: SEVERAL DAYS
7. FEELING AFRAID AS IF SOMETHING AWFUL MIGHT HAPPEN: SEVERAL DAYS
GAD7 TOTAL SCORE: 6
GAD7 TOTAL SCORE: 6
2. NOT BEING ABLE TO STOP OR CONTROL WORRYING: SEVERAL DAYS
5. BEING SO RESTLESS THAT IT IS HARD TO SIT STILL: SEVERAL DAYS
3. WORRYING TOO MUCH ABOUT DIFFERENT THINGS: SEVERAL DAYS
GAD7 TOTAL SCORE: 6
6. BECOMING EASILY ANNOYED OR IRRITABLE: SEVERAL DAYS

## 2021-12-16 ASSESSMENT — ENCOUNTER SYMPTOMS
JOINT SWELLING: 0
DIARRHEA: 0
MYALGIAS: 1
ARTHRALGIAS: 0
EYE PAIN: 0
DIZZINESS: 0
HEMATOCHEZIA: 0
HEMATURIA: 0
BREAST MASS: 0
PALPITATIONS: 0
HEARTBURN: 0
CONSTIPATION: 0
SORE THROAT: 0
COUGH: 0
FREQUENCY: 0
NERVOUS/ANXIOUS: 1
SHORTNESS OF BREATH: 0
DYSURIA: 0
WEAKNESS: 0
NAUSEA: 0
HEADACHES: 0
ABDOMINAL PAIN: 0
FEVER: 0
CHILLS: 0
PARESTHESIAS: 1

## 2021-12-16 ASSESSMENT — ACTIVITIES OF DAILY LIVING (ADL): CURRENT_FUNCTION: NO ASSISTANCE NEEDED

## 2021-12-16 ASSESSMENT — PAIN SCALES - GENERAL: PAINLEVEL: MILD PAIN (2)

## 2021-12-16 ASSESSMENT — PATIENT HEALTH QUESTIONNAIRE - PHQ9
SUM OF ALL RESPONSES TO PHQ QUESTIONS 1-9: 1
10. IF YOU CHECKED OFF ANY PROBLEMS, HOW DIFFICULT HAVE THESE PROBLEMS MADE IT FOR YOU TO DO YOUR WORK, TAKE CARE OF THINGS AT HOME, OR GET ALONG WITH OTHER PEOPLE: NOT DIFFICULT AT ALL
SUM OF ALL RESPONSES TO PHQ QUESTIONS 1-9: 1

## 2021-12-16 NOTE — PROGRESS NOTES
"SUBJECTIVE:   Shelley Mccrary is a 71 year old female who presents for Preventive Visit.        Are you in the first 12 months of your Medicare coverage?  No    Healthy Habits:     In general, how would you rate your overall health?  Excellent    Frequency of exercise:  6-7 days/week    Duration of exercise:  Greater than 60 minutes    Do you usually eat at least 4 servings of fruit and vegetables a day, include whole grains    & fiber and avoid regularly eating high fat or \"junk\" foods?  Yes    Taking medications regularly:  Yes    Medication side effects:  None    Ability to successfully perform activities of daily living:  No assistance needed    Home Safety:  No safety concerns identified    Hearing Impairment:  Difficulty following a conversation in a noisy restaurant or crowded room, feel that people are mumbling or not speaking clearly, need to ask people to speak up or repeat themselves and difficulty understanding soft or whispered speech    In the past 6 months, have you been bothered by leaking of urine?  No    In general, how would you rate your overall mental or emotional health?  Excellent      PHQ-2 Total Score: 0    Additional concerns today:  Yes        Do you feel safe in your environment? Yes    Have you ever done Advance Care Planning? (For example, a Health Directive, POLST, or a discussion with a medical provider or your loved ones about your wishes): No, advance care planning information given to patient to review.  Patient plans to discuss their wishes with loved ones or provider.         Fall risk  Fallen 2 or more times in the past year?: No  Any fall with injury in the past year?: No    Cognitive Screening   1) Repeat 3 items (Leader, Season, Table)    2) Clock draw: NORMAL  3) 3 item recall: Recalls 3 objects  Results: 3 items recalled: COGNITIVE IMPAIRMENT LESS LIKELY    Mini-CogTM Copyright S Geetha. Licensed by the author for use in [facility]; reprinted with " permission (tonio@[facility]). All rights reserved.      Do you have sleep apnea, excessive snoring or daytime drowsiness?: no    Reviewed and updated as needed this visit by clinical staff                Reviewed and updated as needed this visit by Provider               Social History     Tobacco Use     Smoking status: Former Smoker     Types: Cigarettes     Quit date: 1990     Years since quittin.9     Smokeless tobacco: Never Used     Tobacco comment: smoked 1-2 pack week, 30 years   Substance Use Topics     Alcohol use: No     Alcohol/week: 0.0 standard drinks     If you drink alcohol do you typically have >3 drinks per day or >7 drinks per week? No    Alcohol Use 2021   Prescreen: >3 drinks/day or >7 drinks/week? No   Prescreen: >3 drinks/day or >7 drinks/week? -           PROBLEMS TO ADD ON...    Hypertension Follow-up      Do you check your blood pressure regularly outside of the clinic? Yes sometimes     Are you following a low salt diet? Yes    Are your blood pressures ever more than 140 on the top number (systolic) OR more   than 90 on the bottom number (diastolic), for example 140/90? No    patient wants to recheck if needing the lisinopril-she has been seeing cardiologist for hypertension cardiomyopathy and a visit recently.  Blood pressure was fluctuating a bit so she was started back on lisinopril.  She has no problem taking medication.  She denies any chest pain palpitation shortness of breath leg edema.  She thinks her blood pressure is doing very depending on stress and other things, so she was unsure if she really needs to continue lisinopril.  Although she denies any problem taking medication feels well.         Hyperlipidemia Follow-Up        Are you regularly taking any medication or supplement to lower your cholesterol?   Yes- see epic     Are you having muscle aches or other side effects that you think could be caused by your cholesterol lowering medication?  No    EAR  problem    Ears- worried about wax, and also ongoing hearing concerns.  He has previous history of decreased hearing in her right ear for a long time.   left ear is usually good although bothering her lately,  and she mostly thinks it is probably wax ,sometimes hard for her to understand bc of people wearing mask   she was given a referral to ENT but she did not like driving all the way to [place] she prefers something closer.       Current providers sharing in care for this patient include:   Patient Care Team:  Fabiola Mahmood MD as PCP - General (Family Practice)  Fabiola Mahmood MD as Assigned PCP  Eliza Santana PA-C as Assigned Heart and Vascular Provider  Bill Pham MD as MD (Dermatology)    The following health maintenance items are reviewed in Epic and correct as of today:  Health Maintenance Due   Topic Date Due     KALI ASSESSMENT  06/15/2021     PHQ-9  06/15/2021     INFLUENZA VACCINE (1) 09/01/2021     CMP  12/15/2021     LIPID  12/15/2021     FALL RISK ASSESSMENT  12/15/2021     COLORECTAL CANCER SCREENING  12/15/2021     MEDICARE ANNUAL WELLNESS VISIT  12/15/2021     MAMMO SCREENING  12/17/2021     Patient Active Problem List   Diagnosis     CAD (coronary artery disease)     Hyperlipidemia LDL goal <70     Disturbance of skin sensation     Elevated LFTs     Adjustment disorder with mixed anxiety and depressed mood     Myocardial infarction (H)     Ischemic cardiomyopathy     Palpitations     Acute myocardial infarction of other inferior wall, initial episode of care     Anxiety     Coronary artery disease involving native coronary artery of native heart without angina pectoris     Tinnitus, bilateral     Gastroesophageal reflux disease, esophagitis presence not specified     Cardiomyopathy, unspecified (H)     Other screening mammogram     ACP (advance care planning)     Essential hypertension, benign     Other nonallopathic lesion of cervical region     Spasm of  muscle     Sprain of neck     Past Surgical History:   Procedure Laterality Date     ANGIOPLASTY  2015    MAURISIO to mid circ, thrombectomy -MAURISIO to proximal, mid, distal RCA , successful balloon to 1st RPL done Resolute stents used procedure done at [facility]     INNER EAR SURGERY      redesign roxann nieto, bc of miners diseas        Social History     Tobacco Use     Smoking status: Former Smoker     Types: Cigarettes     Quit date: 1990     Years since quittin.9     Smokeless tobacco: Never Used     Tobacco comment: smoked 1-2 pack week, 30 years   Substance Use Topics     Alcohol use: No     Alcohol/week: 0.0 standard drinks     Family History   Problem Relation Age of Onset     Coronary Artery Disease Father         at age 39, and other heart problem      Hyperlipidemia Father         ?      Hypertension Mother      Diabetes No family hx of      Cerebrovascular Disease No family hx of      Breast Cancer No family hx of      Colon Cancer No family hx of          Pneumonia Vaccine:Adults age 65+ who received Pneumovax (PPSV23) at 65 years or older: Should be given PCV13 > 1 year after their most recent PPSV23  Mammogram Screening: Mammogram Screening: Recommended mammography every 1-2 years with patient discussion and risk factor consideration          CONSTITUTIONAL: NEGATIVE for fever, chills, change in weight  INTEGUMENTARY/SKIN: NEGATIVE for worrisome rashes, moles or lesions.  She had seen dermatology usually once a year, although since Dr. Garcia his labs she has not been back, so is to schedule appointment with the dermatology.  EYES: NEGATIVE for vision changes or irritation  ENT/MOUTH: NEGATIVE for ear, mouth and throat problemsHealthy Habits:     In general, how would you rate your overall health?  Excellent    Frequency of exercise:  6-7 days/week    Duration of exercise:  Greater than 60 minutes    Do you usually eat at least 4 servings of fruit and vegetables a day, include whole  "grains    & fiber and avoid regularly eating high fat or \"junk\" foods?  Yes    Taking medications regularly:  Yes    Medication side effects:  None    Ability to successfully perform activities of daily living:  No assistance needed    Home Safety:  No safety concerns identified    Hearing Impairment:  Difficulty following a conversation in a noisy restaurant or crowded room, feel that people are mumbling or not speaking clearly, need to ask people to speak up or repeat themselves and difficulty understanding soft or whispered speech. But she thinsk it is mostly bc she has ear wax. 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  No    In general, how would you rate your overall mental or emotional health?  Excellent      PHQ-2 Total Score: 0    Additional concerns today:  Yes  RESP: NEGATIVE for significant cough or SOB  BREAST: NEGATIVE for masses, tenderness or discharge  CV: NEGATIVE for chest pain, palpitations or peripheral edema  GI: NEGATIVE for nausea, abdominal pain, heartburn, or change in bowel habits  : NEGATIVE for frequency, dysuria, or hematuria  MUSCULOSKELETAL: NEGATIVE for significant arthralgias or myalgia  NEURO: NEGATIVE for weakness, dizziness or paresthesias  ENDOCRINE: NEGATIVE for temperature intolerance, skin/hair changes  HEME: NEGATIVE for bleeding problems  PSYCHIATRIC: NEGATIVE for changes in mood or affect.  She thinks she is doing well mood wise and not concerned.  She sometimes gets more anxious in the winter, so now she will be going to [place] leaving next week.    so she always feels better in the warm weather.     OBJECTIVE:   There were no vitals taken for this visit. Estimated body mass index is 25.47 kg/m  as calculated from the following:    Height as of 12/2/21: 1.549 m (5' 1\").    Weight as of 12/2/21: 61.1 " kg (134 lb 12.8 oz).  Physical Exam  GENERAL: healthy, alert and no distress  EYES: Eyes grossly normal to inspection, PERRL and conjunctivae and sclerae normal  HENT: ear canals and TM's normal, nose and mouth without ulcers or lesions  NECK: no adenopathy, no asymmetry, masses, or scars and thyroid normal to palpation  RESP: lungs clear to auscultation - no rales, rhonchi or wheezes  BREAST: normal without masses, tenderness or nipple discharge and no palpable axillary masses or adenopathy  CV: regular rate and rhythm, normal S1 S2, no S3 or S4, no murmur, click or rub, no peripheral edema   ABDOMEN: soft, nontender, no hepatosplenomegaly, no masses and bowel sounds normal   (female): deferred  MS: extremities normal- no gross deformities noted  SKIN: no suspicious lesions or rashes  NEURO: Normal strength and tone, mentation intact and speech normal  PSYCH: mentation appears normal, affect normal/bright        ASSESSMENT / PLAN:   (Z00.00) Routine history and physical examination of adult  (primary encounter diagnosis)  Comment:   Plan: Adult Gastro Ref - Procedure Only            (Z12.11) Screening for colon cancer  Comment:   Plan: Fecal colorectal cancer screen (FIT), Adult         Gastro Ref - Procedure Only            (Z12.31) Breast cancer screening by mammogram  Comment:   Plan: MA SCREENING DIGITAL BILAT - Future  (s+30)      (H61.23) Excessive ear wax, bilateral  (primary encounter diagnosis)  Comment:   Plan: CA REMOVAL IMPACTED CERUMEN IRRIGATION/LVG         UNILAT        Cerumenosis is noted.  Wax is removed by syringing and manual debridement. Instructions for home care to prevent wax buildup are given.      (H91.90) Decreased hearing, unspecified laterality  Comment:rt worse then left   Plan: Otolaryngology Referral         has ongoing issue , so proceed with further evaluation.  Referral given.     (Z12.83) Skin cancer screening  Comment:  Plan: Adult Dermatology Referral         She is due for  "her routine check for the Derm.  Since Dr. Garcia has  Left, she wish to establish with  another provider here    (E78.5) Hyperlipidemia LDL goal <70  Comment:   Plan: atorvastatin (LIPITOR) 40 MG tablet              (I10) Benign essential hypertension  Comment: lisinopril was added back by cardiology recently doing well   Plan: BP in adequate control. Discussed cares, low fat low salt diet etc. Check labs. Say on same med's for now. encouraged home BP monitoring.   Follow up recheck in 6 months, sooner if problem.           (I42.8) Other cardiomyopathy (H)  Comment: seeing cardilogy- stable   Plan: metoprolol succinate ER (TOPROL-XL) 25 MG 24 hr        tablet              Check labs. refill sent.Cares and  treatment discussed. follow up if problem   Patient expressed understanding and agreement with treatment plan. All patient's questions were answered, will let me know if has more later.  Medications: Rx's: Reviewed the potential side effects/complications of medications prescribed.     COUNSELING:  Reviewed preventive health counseling, as reflected in patient instructions       Regular exercise       Healthy diet/nutrition       Vision screening       Hearing screening       Dental care       Bladder control       Fall risk prevention       Immunizations    Vaccinated for influenza today            Osteoporosis prevention/bone health       Hepatitis C screening       HIV screening for high risk patient       Advanced Planning     Estimated body mass index is 25.47 kg/m  as calculated from the following:    Height as of 12/2/21: 1.549 m (5' 1\").    Weight as of 12/2/21: 61.1 kg (134 lb 12.8 oz).        She reports that she quit smoking about 31 years ago. Her smoking use included cigarettes. She has never used smokeless tobacco.      Appropriate preventive services were discussed with this patient, including applicable screening as appropriate for cardiovascular disease, diabetes, osteopenia/osteoporosis, and " glaucoma.  As appropriate for age/gender, discussed screening for colorectal cancer, prostate cancer, breast cancer, and cervical cancer. Checklist reviewing preventive services available has been given to the patient.    Reviewed patients plan of care and provided an AVS. The Basic Care Plan (routine screening as documented in Health Maintenance) for Shelley meets the Care Plan requirement. This Care Plan has been established and reviewed with the Patient.    Counseling Resources:  ATP IV Guidelines  Pooled Cohorts Equation Calculator  Breast Cancer Risk Calculator  Breast Cancer: Medication to Reduce Risk  FRAX Risk Assessment  ICSI Preventive Guidelines  Dietary Guidelines for Americans, 2010  Likelii's MyPlate  ASA Prophylaxis  Lung CA Screening    Fabiola Mahmood MD  [facility] [place]    Identified Health Risks:  Answers for HPI/ROS submitted by the patient on 12/16/2021  If you checked off any problems, how difficult have these problems made it for you to do your work, take care of things at home, or get along with other people?: Not difficult at all  PHQ9 TOTAL SCORE: 1  KALI 7 TOTAL SCORE: 6

## 2021-12-16 NOTE — PATIENT INSTRUCTIONS
Patient Education     Prevention Guidelines, Women Ages 65 and Older  Screening tests and vaccines are an important part of managing your health. A screening test is done to find possible disorders or diseases in people who don't have any symptoms. The goal is to find a disease early so lifestyle changes can be made and you can be watched more closely to reduce the risk of disease, or to detect it early enough to treat it most effectively. Screening tests are not considered diagnostic, but are used to determine if more testing is needed. Health counseling is essential, too. Below are guidelines for these, for women ages 65 and older. Talk with your healthcare provider to make sure you re up to date on what you need.  Screening Who needs it How often   Type 2 diabetes or prediabetes All women beginning at age 45 and women without symptoms at any age who are overweight or obese and have 1 or more additional risk factors for diabetes At least every 3 years   Type 2 diabetes All women with prediabetes Every year   Unhealthy alcohol use All women in this age group At routine exams   Blood pressure All women in this age group Yearly checkup if your blood pressure is normal  Normal blood pressure is less than 120/80 mm Hg  If your blood pressure reading is higher than normal, follow the advice of your healthcare provider   Breast cancer All women of average risk Mammograms should be done every 1 or 2 years. Talk with your healthcare provider about your risk factors and how often you need the test and for how long.   Cervical cancer Only women who had abnormal screening results before age 65 Talk with your healthcare provider   Chlamydia Women at increased risk for infection At routine exams   Colorectal cancer All women at average risk in this age group through age 75 who are in good health. For women ages 76 to 85, talk with your healthcare provider about whether to continue screening. For women 85 and older, screening  is not needed. Multiple tests are available and are used at different times. Possible tests include:    Flexible sigmoidoscopy every 5 years, or    Colonoscopy every 10 years, or    CT colonography (virtual colonoscopy) every 5 years, or    Yearly fecal occult blood test, or    Yearly fecal immunochemical test every year, or    Stool DNA test, every 3 years  If you choose a test other than a colonoscopy and have an abnormal test result, you will need to follow-up with a colonoscopy. Talk with your healthcare provider which test is best for you.  Some people should be screened using a different schedule because of their personal or family health history. Talk with your healthcare provider about your health history.   Depression All women in this age group At routine exams   Gonorrhea Sexually active women at increased risk for infection At yearly routine exams   Hepatitis C Anyone at increased risk; 1 time for those born between 1945 and 1965 At routine exams   High cholesterol or triglycerides All women in this age group who are at risk for coronary artery disease At least every 5 years; talk with your healthcare provider about your risk   HIV Women at increased risk for infection At routine exams; talk with your healthcare provider about your risk   Lung cancer Adults ages 55 to 74 who are in fairly good health and are at higher risk for lung cancer    Currently smoke or have quit within the past 15 years    30-pack year smoking history  Eligibility criteria and age limit (possibly up to age 80) may vary across major organizations Yearly lung cancer screening with a low dose CT scan (LDCT); talk with your healthcare provider   Obesity All women in this age group At yearly routine exams   Osteoporosis All women in this age group Routinely done every 2 years, but repeat as advised by your healthcare provider   Syphilis Women at increased risk for infection At routine exams; talk with your healthcare provider    Thyroid-stimulating hormone (TSH) Only women in this age group with symptoms of thyroid dysfunction Talk with your healthcare provider   Tuberculosis Women at increased risk for infection Talk with your healthcare provider   Vision All women in this age group Every 1 to 2 years; if you have a chronic health condition, ask your healthcare provider if you need exams more often   Vaccine Who needs it How often   Chickenpox (varicella) All women in this age group who have no record of this infection or vaccine 2 doses; second dose should be given at least 4 weeks after the first dose   Hepatitis A Women at increased risk for infection 2 or 3 doses (depending on the vaccine) given at least 6 months apart; talk with your healthcare provider   Hepatitis B Women at increased risk for infection 2 or 3 doses (depending on the vaccine); second dose should be given 1 month after the first dose; if a the third dose, it should be given at least 2 months after the second dose and at least 4 months after the first dose   Haemophilus influenza type B (HIB) Women at increased risk for infection 1 to 3 doses; talk with your healthcare provider   Influenza (flu) All women in this age group Once a year   Pneumococcal conjugate vaccine (PCV13) and pneumococcal polysaccharide vaccine (PPSV23) All women in this age group  PPSV 23: women who have not been vaccinated or have not had infection  PCV 13: women at increased risk for infection PPSV: 1 dose at age 65 or older  PCV 13: 1 dose at age 65 or older; talk with your healthcare provider   Tetanus/diphtheria/pertussis (Td/Tdap) booster All women in this age group Td every 10 years, or a 1-time dose of Tdap instead of a Td booster after age 18, then Td every 10 years   Zoster (shingles) All women in this age group 2 vaccines are available:    Recombinant zoster vaccine (RZV; Shigrix) is recommended as the preferred shingles vaccine. It's given in a series of 2 doses. The 2nd dose is given  2 to 6 months after the first. This is given even if you've had shingles before or had a previous zoster live vaccine.    Zoster live vaccine live (ZVL; Zostavax) may be given to healthy adults over age 60. It's given in one dose.   Counseling Who needs it How often   Diet and exercise Women who are overweight or obese When diagnosed, and then at routine exams   Fall prevention (exercise and vitamin D supplements) All women in this age group At yearly routine exams   Sexually transmitted infection prevention Women at increased risk for infection-talk with your healthcare provider At routine exams   Use of daily aspirin Women up to age 70 who are at high risk for cardiovascular problems and not at increased risk for bleeding as identified by your healthcare provider When your risk is known   Use of tobacco and the health effects it can cause All women in this age group Every exam   Lorenzo last reviewed this educational content on 7/1/2020 2000-2021 [employer]. All rights reserved. This information is not intended as a substitute for professional medical care. Always follow your healthcare professional's instructions.

## 2021-12-17 LAB
ALBUMIN SERPL-MCNC: 3.3 G/DL (ref 3.4–5)
ALP SERPL-CCNC: 70 U/L (ref 40–150)
ALT SERPL W P-5'-P-CCNC: 26 U/L (ref 0–50)
ANION GAP SERPL CALCULATED.3IONS-SCNC: 6 MMOL/L (ref 3–14)
AST SERPL W P-5'-P-CCNC: 23 U/L (ref 0–45)
BILIRUB SERPL-MCNC: 0.5 MG/DL (ref 0.2–1.3)
BUN SERPL-MCNC: 14 MG/DL (ref 7–30)
CALCIUM SERPL-MCNC: 9.1 MG/DL (ref 8.5–10.1)
CHLORIDE BLD-SCNC: 107 MMOL/L (ref 94–109)
CO2 SERPL-SCNC: 26 MMOL/L (ref 20–32)
CREAT SERPL-MCNC: 0.8 MG/DL (ref 0.52–1.04)
GFR SERPL CREATININE-BSD FRML MDRD: 73 ML/MIN/1.73M2
GLUCOSE BLD-MCNC: 90 MG/DL (ref 70–99)
POTASSIUM BLD-SCNC: 4.4 MMOL/L (ref 3.4–5.3)
PROT SERPL-MCNC: 7.2 G/DL (ref 6.8–8.8)
SODIUM SERPL-SCNC: 139 MMOL/L (ref 133–144)

## 2021-12-17 PROCEDURE — 82274 ASSAY TEST FOR BLOOD FECAL: CPT | Performed by: FAMILY MEDICINE

## 2021-12-17 ASSESSMENT — ANXIETY QUESTIONNAIRES: GAD7 TOTAL SCORE: 6

## 2021-12-18 LAB — HEMOCCULT STL QL IA: NEGATIVE

## 2021-12-21 LAB
CHOLEST SERPL-MCNC: 141 MG/DL
FASTING STATUS PATIENT QL REPORTED: YES
HDLC SERPL-MCNC: 56 MG/DL
LDLC SERPL CALC-MCNC: 73 MG/DL
NONHDLC SERPL-MCNC: 85 MG/DL
TRIGL SERPL-MCNC: 58 MG/DL

## 2022-01-10 ENCOUNTER — TELEPHONE (OUTPATIENT)
Dept: GASTROENTEROLOGY | Facility: CLINIC | Age: 72
End: 2022-01-10
Payer: MEDICARE

## 2022-01-10 ENCOUNTER — HOSPITAL ENCOUNTER (OUTPATIENT)
Facility: CLINIC | Age: 72
End: 2022-01-10
Attending: COLON & RECTAL SURGERY | Admitting: COLON & RECTAL SURGERY
Payer: MEDICARE

## 2022-01-10 NOTE — TELEPHONE ENCOUNTER
Screening Questions  1. Are you active on mychart? Y    2. What insurance is in the chart? Medicare     2.  Ordering/Referring Provider: Fabiola Mahmood,    3. BMI 23.7 , If greater than 40 review exclusion criteria also will need EXTENDED PREP    4.  Respiratory Screening (If yes to any of the following HOSPITAL setting only):     Do you use daily home oxygen? N  Do you have mod to severe Obstructive Sleep Apnea? N   Do you have Pulmonary Hypertension? N   Do you have UNCONTROLLED asthma? N    5. Have you had a heart or lung transplant? N  (If yes, please review exclusion criteria)    6. Are you currently on dialysis?N  (If yes, schedule in HOSPITAL setting only)(If yes, please send Golytely prep)    7. Do you have chronic kidney disease? N (If yes, please send Golytely prep)    7. Have you had a stroke or Transient ischemic attack (TIA) within 6 months? N (If yes, do not schedule at [facility])    8. In the past 6 months, have you had any heart related issues including cardiomyopathy or heart attack? N (If yes, please review exclusion criteria)           If yes, did it require cardiac stenting or other implantable device?N  (If yes, please review exclusion criteria)      9. Do you have any implantable devices in your body (pacemaker, defib, LVAD)? N (If yes, schedule at [facility])    10. Do you take nitroglycerin? If yes, how often? N (if yes, schedule at HOSPITAL setting)    11. Are you currently taking any blood thinners?N (If yes- inform patient to follow up with PCP or provider for follow up instructions)     12. Are you a diabetic? N (If yes, please send Golytely prep)    13. (Females) Are you currently pregnant? N  If yes, how many weeks?      15. Are you taking any prescription pain medications on a routine schedule? N If yes, MAC sedation and patient will need EXTENDED PREP.    16. Do you have any chemical dependencies such as alcohol, street drugs, or methadone? N If yes, MAC sedation and patient will need  EXTENDED PREP    17. Do you have any history of post-traumatic stress syndrome, severe anxiety or history of psychosis? N  If yes, MAC sedation.     18. Do you transfer independently? Y    19.  Do you have any issues with constipation? N   If yes, pt will need EXTENDED PREP     20. Preferred Pharmacy for Pre Prescription     Scheduling Details    Which Colonoscopy Prep was Sent?: M Prep  Type of Procedure Scheduled: Colon  Surgeon: Collin  Date of Procedure: 4-27  Location: [facility]  Caller (Please ask for phone number if not scheduled by patient): [place]      Sedation Type: CS  Conscious Sedation- Needs  for 6 hours after the procedure  MAC/General-Needs  for 24 hours after procedure    Pre-op Required at [facility] [place], [place] and OR for MAC sedation:   (if yes advise patient they will need a pre-op prior to procedure)      Informed patient they will need an adult  Y  Cannot take any type of public or medical transportation alone    Pre-Procedure Covid test to be completed at [facility] or Externally: Y but will schedule at a later date    Confirmed Nurse will call to complete assessment Y    Additional comments:  (DE JOSE'S PATIENTS NEED EXTENDED PREP)

## 2022-02-26 ENCOUNTER — HEALTH MAINTENANCE LETTER (OUTPATIENT)
Age: 72
End: 2022-02-26

## 2022-03-14 DIAGNOSIS — Z11.59 ENCOUNTER FOR SCREENING FOR OTHER VIRAL DISEASES: Primary | ICD-10-CM

## 2022-04-15 ENCOUNTER — TELEPHONE (OUTPATIENT)
Dept: GASTROENTEROLOGY | Facility: CLINIC | Age: 72
End: 2022-04-15
Payer: MEDICARE

## 2022-04-15 NOTE — TELEPHONE ENCOUNTER
Caller: Shelley Mccrary      Procedure: Colonoscopy    Date, Location, and Surgeon of Procedure Cancelled: 4/27,Collin SANCHEZ    Ordering Provider:Timoteo    Reason for cancel (please be detailed, any staff messages or encounters to note?): Out of town        Rescheduled: y     If rescheduled:    Date: 6/6   Location:    Prep Resent: (changes to prep?)   Covid Test Rescheduled: Yes and No   Note any change or update to original order/sedation:

## 2022-04-27 ENCOUNTER — ANCILLARY PROCEDURE (OUTPATIENT)
Dept: MAMMOGRAPHY | Facility: CLINIC | Age: 72
End: 2022-04-27
Attending: FAMILY MEDICINE
Payer: MEDICARE

## 2022-04-27 DIAGNOSIS — Z12.31 BREAST CANCER SCREENING BY MAMMOGRAM: ICD-10-CM

## 2022-04-27 PROCEDURE — 77063 BREAST TOMOSYNTHESIS BI: CPT | Mod: TC | Performed by: RADIOLOGY

## 2022-04-27 PROCEDURE — 77067 SCR MAMMO BI INCL CAD: CPT | Mod: TC | Performed by: RADIOLOGY

## 2022-05-09 ENCOUNTER — OFFICE VISIT (OUTPATIENT)
Dept: FAMILY MEDICINE | Facility: CLINIC | Age: 72
End: 2022-05-09
Attending: FAMILY MEDICINE
Payer: MEDICARE

## 2022-05-09 VITALS — SYSTOLIC BLOOD PRESSURE: 122 MMHG | DIASTOLIC BLOOD PRESSURE: 76 MMHG

## 2022-05-09 DIAGNOSIS — L81.4 LENTIGINES: ICD-10-CM

## 2022-05-09 DIAGNOSIS — L57.0 ACTINIC KERATOSIS: Primary | ICD-10-CM

## 2022-05-09 DIAGNOSIS — L82.1 SEBORRHEIC KERATOSES: ICD-10-CM

## 2022-05-09 DIAGNOSIS — D22.9 MULTIPLE BENIGN NEVI: ICD-10-CM

## 2022-05-09 DIAGNOSIS — Z12.83 SKIN CANCER SCREENING: ICD-10-CM

## 2022-05-09 PROCEDURE — 99203 OFFICE O/P NEW LOW 30 MIN: CPT | Mod: 25 | Performed by: PHYSICIAN ASSISTANT

## 2022-05-09 PROCEDURE — 17000 DESTRUCT PREMALG LESION: CPT | Performed by: PHYSICIAN ASSISTANT

## 2022-05-09 PROCEDURE — 17003 DESTRUCT PREMALG LES 2-14: CPT | Performed by: PHYSICIAN ASSISTANT

## 2022-05-09 NOTE — LETTER
5/9/2022         RE: Shelley Mccrary  [address]  [place] [address]        Dear Colleague,    Thank you for referring your patient, Shelley Mccrary, to the [facility] [place]. Please see a copy of my visit note below.    [facility] Dermatology Note  Encounter Date: May 9, 2022  Office Visit     Dermatology Problem List:  1. AK s/p cryo  2. Hx radiation on R face to remove birthmark   ____________________________________________    Assessment & Plan:  # Actinic keratosis. Mid forehead x1, R cheek x 3, upper cutaneous lip x 3, R upper back x1  - Cryotherapy performed today (see procedure note(s) below).   - edu on etiology    # Multiple clinically benign nevi on the trunk and extremities.   - Signs and Symptoms of non-melanoma skin cancer and ABCDEs of melanoma reviewed with patient. Patient encouraged to perform monthly self skin exams and educated on how to perform them. UV precautions reviewed with patient. Patient was asked about new or changing moles/lesions on body.   - Sunscreen: Apply 20 minutes prior to going outdoors and reapply every two hours, when wet or sweating. We recommend using an SPF 30 or higher, and to use one that is water resistant.     -Continue annual skin exams    # Seborrheic keratosis, non irritated.   - NTD: No further management at this time.     # Solar lentigines.   - NTD: No further management at this time.  - Sun protection: Counseled SPF30+ sunscreen, UPF clothing, sun avoidance, tanning bed avoidance.     Procedures Performed:   - Cryotherapy procedure note, location(s): see above. After verbal consent and discussion of risks and benefits including, but not limited to, dyspigmentation/scar, blister, and pain, 8 lesion(s) was(were) treated with 1-2 mm freeze border for 1-2 cycles with liquid nitrogen. Post cryotherapy instructions were provided.      Follow-up: 1 year(s) in-person, or earlier for new or changing  "lesions    Staff and Scribe:     Scribe Disclosure:  I, Xin Jesus, am serving as a scribe to prep the notes for Carol Hummel PA-C .    Provider Disclosure:   The documentation recorded by the scribe accurately reflects the services I personally performed and the decisions made by me.    All risks, benefits and alternatives were discussed with patient.  Patient is in agreement and understands the assessment and plan.  All questions were answered.    Carol Hummel PA-C, [facility]  [facility]: Phone: [phone], Fax: [phone]  [facility]: Phone: [phone],  Fax: [phone]  [facility]: Phone: [phone], Fax: [phone]  ____________________________________________    CC: No chief complaint on file.    HPI:  Ms. Shelley Mccrary is a(n) 72 year old female who presents today as a return patient for [facility]. Last seen by Dr. Thomas on 12/21/2020 at which point 2 AKs on the face were treated with cryotherapy. Hx radiation on the R side of the face when she was a baby to remove a \"spot/birthmark\".     Has a few dry spots on her face and back. She has been putting creams on it. Would lobo biopsy site rechecked as it seems to burn easily.     Does wear sunscreen in the summer. Has a cabin.     Patient is otherwise feeling well, without additional skin concerns.    Labs Reviewed:  none    Physical Exam:  Vitals: There were no vitals taken for this visit.  SKIN: Full skin, which includes the head/face, both arms, chest, back, abdomen,both legs, genitalia and/or groin buttocks, digits and/or nails, was examined.  - skin is tanned  - There are erythematous macules with overyling adherent scale on the R cheek, forehead, upper lip and R upper back.   -Scattered brown macules on sun exposed areas.  -There are waxy stuck on tan to brown papules on the trunk..   - Multiple regular brown pigmented macules " and papules are identified on the trunk and extremities, <100.    -Baird's skin type II  - No other lesions of concern on areas examined.     Medications:  Current Outpatient Medications   Medication     ALPRAZolam (XANAX) 0.25 MG tablet     aspirin 81 MG tablet     atorvastatin (LIPITOR) 40 MG tablet     diphenhydrAMINE HCl (BENADRYL PO)     lisinopril (ZESTRIL) 5 MG tablet     MELATONIN PO     metoprolol succinate ER (TOPROL-XL) 25 MG 24 hr tablet     Multiple Vitamin (MULTI-VITAMINS) TABS     nitroglycerin (NITROSTAT) 0.4 MG SL tablet     Omega-3 Fatty Acids (OMEGA-3 FISH OIL PO)     No current facility-administered medications for this visit.      Past Medical History:   Patient Active Problem List   Diagnosis     CAD (coronary artery disease)     Hyperlipidemia LDL goal <70     Disturbance of skin sensation     Adjustment disorder with mixed anxiety and depressed mood     Myocardial infarction (H)     Ischemic cardiomyopathy     Palpitations     Acute myocardial infarction of other inferior wall, initial episode of care     Anxiety     Coronary artery disease involving native coronary artery of native heart without angina pectoris     Tinnitus, bilateral     Gastroesophageal reflux disease, esophagitis presence not specified     Cardiomyopathy, unspecified (H)     Other screening mammogram     Routine history and physical examination of adult     Benign essential hypertension     Other nonallopathic lesion of cervical region     Spasm of muscle     Sprain of neck     Past Medical History:   Diagnosis Date     Anxiety     post MI     CAD (coronary artery disease) 7/29/2015     Depression     post MI     MRSA infection     hand      Myocardial infarction (H) 7/2015     Vertigo     related miners         CC Fabiola Mahmood MD  [address] [place] [address] on close of this encounter.        Again, thank you for allowing me to participate in the care of your patient.         Sincerely,        Carol Hummel PA-C

## 2022-05-09 NOTE — PROGRESS NOTES
[facility] Dermatology Note  Encounter Date: May 9, 2022  Office Visit     Dermatology Problem List:  1. AK s/p cryo  2. Hx radiation on R face to remove birthmark   ____________________________________________    Assessment & Plan:  # Actinic keratosis. Mid forehead x1, R cheek x 3, upper cutaneous lip x 3, R upper back x1  - Cryotherapy performed today (see procedure note(s) below).   - edu on etiology    # Multiple clinically benign nevi on the trunk and extremities.   - Signs and Symptoms of non-melanoma skin cancer and ABCDEs of melanoma reviewed with patient. Patient encouraged to perform monthly self skin exams and educated on how to perform them. UV precautions reviewed with patient. Patient was asked about new or changing moles/lesions on body.   - Sunscreen: Apply 20 minutes prior to going outdoors and reapply every two hours, when wet or sweating. We recommend using an SPF 30 or higher, and to use one that is water resistant.     -Continue annual skin exams    # Seborrheic keratosis, non irritated.   - NTD: No further management at this time.     # Solar lentigines.   - NTD: No further management at this time.  - Sun protection: Counseled SPF30+ sunscreen, UPF clothing, sun avoidance, tanning bed avoidance.     Procedures Performed:   - Cryotherapy procedure note, location(s): see above. After verbal consent and discussion of risks and benefits including, but not limited to, dyspigmentation/scar, blister, and pain, 8 lesion(s) was(were) treated with 1-2 mm freeze border for 1-2 cycles with liquid nitrogen. Post cryotherapy instructions were provided.      Follow-up: 1 year(s) in-person, or earlier for new or changing lesions    Staff and Scribe:     Scribe Disclosure:  I, Xin Jesus, am serving as a scribe to prep the notes for Carol Hummel PA-C .    Provider Disclosure:   The documentation recorded by the scribe accurately reflects the services I personally performed and the  "decisions made by me.    All risks, benefits and alternatives were discussed with patient.  Patient is in agreement and understands the assessment and plan.  All questions were answered.    Carol Hummel PA-C, MPAS  [facility] [place]: Phone: [phone], Fax: [phone]  [facility]: Phone: [phone],  Fax: [phone]  [facility]: Phone: [phone], Fax: [phone]  ____________________________________________    CC: No chief complaint on file.    HPI:  Ms. Shelley Mccrary is a(n) 72 year old female who presents today as a return patient for [facility]. Last seen by Dr. Thomas on 12/21/2020 at which point 2 AKs on the face were treated with cryotherapy. Hx radiation on the R side of the face when she was a baby to remove a \"spot/birthmark\".     Has a few dry spots on her face and back. She has been putting creams on it. Would lobo biopsy site rechecked as it seems to burn easily.     Does wear sunscreen in the summer. Has a cabin.     Patient is otherwise feeling well, without additional skin concerns.    Labs Reviewed:  none    Physical Exam:  Vitals: There were no vitals taken for this visit.  SKIN: Full skin, which includes the head/face, both arms, chest, back, abdomen,both legs, genitalia and/or groin buttocks, digits and/or nails, was examined.  - skin is tanned  - There are erythematous macules with overyling adherent scale on the R cheek, forehead, upper lip and R upper back.   -Scattered brown macules on sun exposed areas.  -There are waxy stuck on tan to brown papules on the trunk..   - Multiple regular brown pigmented macules and papules are identified on the trunk and extremities, <100.    -Baird's skin type II  - No other lesions of concern on areas examined.     Medications:  Current Outpatient Medications   Medication     ALPRAZolam (XANAX) 0.25 MG tablet     aspirin 81 MG tablet "     atorvastatin (LIPITOR) 40 MG tablet     diphenhydrAMINE HCl (BENADRYL PO)     lisinopril (ZESTRIL) 5 MG tablet     MELATONIN PO     metoprolol succinate ER (TOPROL-XL) 25 MG 24 hr tablet     Multiple Vitamin (MULTI-VITAMINS) TABS     nitroglycerin (NITROSTAT) 0.4 MG SL tablet     Omega-3 Fatty Acids (OMEGA-3 FISH OIL PO)     No current facility-administered medications for this visit.      Past Medical History:   Patient Active Problem List   Diagnosis     CAD (coronary artery disease)     Hyperlipidemia LDL goal <70     Disturbance of skin sensation     Adjustment disorder with mixed anxiety and depressed mood     Myocardial infarction (H)     Ischemic cardiomyopathy     Palpitations     Acute myocardial infarction of other inferior wall, initial episode of care     Anxiety     Coronary artery disease involving native coronary artery of native heart without angina pectoris     Tinnitus, bilateral     Gastroesophageal reflux disease, esophagitis presence not specified     Cardiomyopathy, unspecified (H)     Other screening mammogram     Routine history and physical examination of adult     Benign essential hypertension     Other nonallopathic lesion of cervical region     Spasm of muscle     Sprain of neck     Past Medical History:   Diagnosis Date     Anxiety     post MI     CAD (coronary artery disease) 7/29/2015     Depression     post MI     MRSA infection     hand      Myocardial infarction (H) 7/2015     Vertigo     related miners         CC Fabiola Mahmood MD  [address] [place] [address] on close of this encounter.

## 2022-05-17 ENCOUNTER — OFFICE VISIT (OUTPATIENT)
Dept: FAMILY MEDICINE | Facility: CLINIC | Age: 72
End: 2022-05-17
Payer: MEDICARE

## 2022-05-17 VITALS
SYSTOLIC BLOOD PRESSURE: 110 MMHG | WEIGHT: 135 LBS | TEMPERATURE: 97.7 F | OXYGEN SATURATION: 97 % | RESPIRATION RATE: 20 BRPM | HEART RATE: 77 BPM | DIASTOLIC BLOOD PRESSURE: 62 MMHG | BODY MASS INDEX: 25.51 KG/M2

## 2022-05-17 DIAGNOSIS — H93.8X9 SENSATION OF PLUGGED EAR, UNSPECIFIED LATERALITY: Primary | ICD-10-CM

## 2022-05-17 DIAGNOSIS — I42.8 OTHER CARDIOMYOPATHY (H): ICD-10-CM

## 2022-05-17 PROCEDURE — 99213 OFFICE O/P EST LOW 20 MIN: CPT | Performed by: FAMILY MEDICINE

## 2022-05-17 RX ORDER — SERTRALINE HYDROCHLORIDE 25 MG/1
25 TABLET, FILM COATED ORAL DAILY
COMMUNITY
End: 2022-12-16

## 2022-05-17 ASSESSMENT — PATIENT HEALTH QUESTIONNAIRE - PHQ9
SUM OF ALL RESPONSES TO PHQ QUESTIONS 1-9: 5
SUM OF ALL RESPONSES TO PHQ QUESTIONS 1-9: 5
10. IF YOU CHECKED OFF ANY PROBLEMS, HOW DIFFICULT HAVE THESE PROBLEMS MADE IT FOR YOU TO DO YOUR WORK, TAKE CARE OF THINGS AT HOME, OR GET ALONG WITH OTHER PEOPLE: SOMEWHAT DIFFICULT

## 2022-05-17 ASSESSMENT — PAIN SCALES - GENERAL: PAINLEVEL: NO PAIN (0)

## 2022-05-17 NOTE — PROGRESS NOTES
Assessment & Plan     Other cardiomyopathy (H)      Sensation of plugged ear, unspecified laterality  Patient has earwax on the left side which was clean tympanic membrane's are normal.  Advised to continue observation if any change in symptoms she will let us know.  She does have some anxiety symptoms she has started taking Zoloft.  Advised to continue that and follow-up with PCP in the next 4 to 6 weeks for reevaluation.                   Return in about 2 weeks (around 5/31/2022) for if symptom does not get better.    Benja Brwon MD  [facility] [place] is a 72 year old who presents for the following health issues         Acute Illness  Acute illness concerns: ear fullness  Onset/Duration: 3 weeks  Symptoms:  Fever: no  Chills/Sweats: no  Headache (location?): no  Sinus Pressure: no  Conjunctivitis:  no  Ear Pain: fullness  Rhinorrhea: no  Congestion: no  Sore Throat: no  Cough: no  Wheeze: no  Decreased Appetite: no  Nausea: YES  Vomiting: no  Diarrhea: no  Dysuria/Freq.: no  Dysuria or Hematuria: no  Fatigue/Achiness: no  Sick/Strep Exposure: no  Therapies tried and outcome: peroxide/flushing      Review of Systems   Constitutional, HEENT, cardiovascular, pulmonary, gi and gu systems are negative, except as otherwise noted.      Objective    /62   Pulse 77   Temp 97.7  F (36.5  C) (Tympanic)   Resp 20   Wt 61.2 kg (135 lb)   SpO2 97%   BMI 25.51 kg/m    Body mass index is 25.51 kg/m .  Physical Exam   GENERAL: healthy, alert and no distress  NECK: no adenopathy, no asymmetry, masses, or scars and thyroid normal to palpation  RESP: lungs clear to auscultation - no rales, rhonchi or wheezes  CV: regular rate and rhythm, normal S1 S2, no S3 or S4, no murmur, click or rub, no peripheral edema and peripheral pulses strong  ABDOMEN: soft, nontender, no hepatosplenomegaly, no masses and bowel sounds normal  MS: no gross musculoskeletal defects noted, no edema  Left  ear has some wax your canals are narrow.

## 2022-05-18 ENCOUNTER — TELEPHONE (OUTPATIENT)
Dept: GASTROENTEROLOGY | Facility: CLINIC | Age: 72
End: 2022-05-18
Payer: MEDICARE

## 2022-05-18 ASSESSMENT — PATIENT HEALTH QUESTIONNAIRE - PHQ9: SUM OF ALL RESPONSES TO PHQ QUESTIONS 1-9: 5

## 2022-05-18 NOTE — TELEPHONE ENCOUNTER
Caller: Shelley    Procedure: Colon    Date, Location, and Surgeon of Procedure Cancelled: 6/6/22 LAUAR Polanco    Ordering Provider:Timoteo    Reason for cancel (please be detailed, any staff messages or encounters to note?): Patient wants to wait until Nov or Dec        Rescheduled: No-She will call back to reschedule     If rescheduled:    Date:    Location:    Prep Resent: (changes to prep?)   Covid Test Rescheduled:    Note any change or update to original order/sedation:

## 2022-06-02 DIAGNOSIS — I10 BENIGN ESSENTIAL HYPERTENSION: ICD-10-CM

## 2022-06-02 RX ORDER — LISINOPRIL 5 MG/1
5 TABLET ORAL DAILY
Qty: 90 TABLET | Refills: 0 | Status: SHIPPED | OUTPATIENT
Start: 2022-06-02 | End: 2022-09-01

## 2022-06-10 DIAGNOSIS — I42.8 OTHER CARDIOMYOPATHY (H): ICD-10-CM

## 2022-06-13 RX ORDER — METOPROLOL SUCCINATE 25 MG/1
TABLET, EXTENDED RELEASE ORAL
Qty: 90 TABLET | Refills: 2 | Status: SHIPPED | OUTPATIENT
Start: 2022-06-13 | End: 2022-12-16

## 2022-06-13 NOTE — TELEPHONE ENCOUNTER
Prescription approved per [facility] Refill Protocol.  Sybil CHADWICK RN  [facility]

## 2022-09-01 DIAGNOSIS — I10 BENIGN ESSENTIAL HYPERTENSION: ICD-10-CM

## 2022-09-01 RX ORDER — LISINOPRIL 5 MG/1
5 TABLET ORAL DAILY
Qty: 90 TABLET | Refills: 0 | Status: SHIPPED | OUTPATIENT
Start: 2022-09-01 | End: 2022-10-03

## 2022-09-01 NOTE — PROGRESS NOTES
[facility] Refill Guideline reviewed.  Medication meets criteria for refill. JUAN ALBERTO Oscar RN

## 2022-10-03 ENCOUNTER — OFFICE VISIT (OUTPATIENT)
Dept: CARDIOLOGY | Facility: CLINIC | Age: 72
End: 2022-10-03
Payer: MEDICARE

## 2022-10-03 VITALS
WEIGHT: 133.6 LBS | HEART RATE: 76 BPM | SYSTOLIC BLOOD PRESSURE: 123 MMHG | OXYGEN SATURATION: 97 % | DIASTOLIC BLOOD PRESSURE: 74 MMHG | HEIGHT: 61 IN | BODY MASS INDEX: 25.22 KG/M2

## 2022-10-03 DIAGNOSIS — I10 BENIGN ESSENTIAL HYPERTENSION: ICD-10-CM

## 2022-10-03 DIAGNOSIS — E78.5 HYPERLIPIDEMIA LDL GOAL <70: ICD-10-CM

## 2022-10-03 DIAGNOSIS — I25.10 CORONARY ARTERY DISEASE INVOLVING NATIVE CORONARY ARTERY OF NATIVE HEART WITHOUT ANGINA PECTORIS: Primary | ICD-10-CM

## 2022-10-03 PROCEDURE — 99213 OFFICE O/P EST LOW 20 MIN: CPT | Performed by: INTERNAL MEDICINE

## 2022-10-03 RX ORDER — LISINOPRIL 5 MG/1
5 TABLET ORAL DAILY
Qty: 90 TABLET | Refills: 3 | Status: SHIPPED | OUTPATIENT
Start: 2022-10-03 | End: 2023-09-28

## 2022-10-03 NOTE — LETTER
10/3/2022    Fabiola Mahmood MD  [address]  [place] [address]    RE: Shelley Mccrary       Dear Colleague,     I had the pleasure of seeing Shelley Mccrary in the [facility].  HPI and Plan:   I had the pleasure of seeing Shelley Mccrary in Cardiology Clinic in followup.      She is a pleasant 72-year-old female with past medical history of inferolateral myocardial infarction in July 2015 when she was admitted to [facility] and had RCA intervention.  At that time, posterolateral branch with angioplasty was also performed.      Patient is doing well.  She denies any chest pain or shortness of breath.  Lately she sold her house in [place] and moved to [place] to be closer to her kids.  The move went well.  She remains active and continues to walk most days of the week and sometimes uphill without any chest pain or shortness of breath.    She had questions regarding lisinopril.  This is a blood pressure medication and add a low-dose of 5 mg/day.  We did refill it.  She was questioning if she has to be on it.  I reminded her that when she was not on it, blood pressures were high.  If she wants to go off it, I will start lowering the dose to half a tablet a day and monitor blood pressure closely at home and if it remains within normal limits, can stop it.  On the other hand if it goes above 1 3585, she should resume it.  She will think about it    I reviewed her lipid profile from December of last year.  Total cholesterol is 141 LDL 73.  She remains on atorvastatin 40 mg/day and tolerates it well.  Last potassium was normal as was creatinine    Last test nuclear study in 2019 revealed inferolateral scar without ischemia.  Exercise capacity was excellent.  No chest pain or EKG changes at that time.  Last echo revealed inferior inferolateral hypokinesis with a globally normal ejection fraction.     PHYSICAL EXAMINATION:   See below      IMPRESSION AND PLAN:   1.  Coronary  artery disease, status post inferior wall myocardial infarction in 07/2015.  She is now free of chest pain and shortness of breath.   Continue present medications including beta-blockers aspirin atorvastatin and lisinopril.     2. Hyperlipidemia  On atorvastatin 40 g daily LDL 73    3.  Hypertension on low-dose lisinopril    Overall, she is doing really well.  Since she is doing well for a while, I will now see her on every 2 years basis.  We can see her in the interim there are any new symptoms.  She will call me if there are any new complaints.         Sincerely,     LAINEY QUISPE MD        cc:   Fabiola Mahmood MD   [place] [phone]        Today's clinic visit entailed:    24 minutes spent on the date of the encounter doing chart review, review of test results, patient visit and documentation   Provider  Link to [facility] Help Grid         Orders Placed This Encounter   Procedures     Follow-Up with Cardiologist       Orders Placed This Encounter   Medications     lisinopril (ZESTRIL) 5 MG tablet     Sig: Take 1 tablet (5 mg) by mouth daily Appointment required for further refills     Dispense:  90 tablet     Refill:  3       Medications Discontinued During This Encounter   Medication Reason     lisinopril (ZESTRIL) 5 MG tablet Reorder         Encounter Diagnoses   Name Primary?     Benign essential hypertension      Coronary artery disease involving native coronary artery of native heart without angina pectoris Yes     Hyperlipidemia LDL goal <70        CURRENT MEDICATIONS:  Current Outpatient Medications   Medication Sig Dispense Refill     aspirin 81 MG tablet Take by mouth daily       atorvastatin (LIPITOR) 40 MG tablet Take 1 tablet (40 mg) by mouth daily 90 tablet 3     diphenhydrAMINE HCl (BENADRYL PO) Take by mouth as needed        lisinopril (ZESTRIL) 5 MG tablet Take 1 tablet (5 mg) by mouth daily Appointment required for further refills 90  tablet 3     metoprolol succinate ER (TOPROL XL) 25 MG 24 hr tablet TAKE 1 TABLET(25 MG) BY MOUTH DAILY 90 tablet 2     Multiple Vitamin (MULTI-VITAMINS) TABS Take 1 tablet by mouth daily        nitroglycerin (NITROSTAT) 0.4 MG SL tablet Place 0.4 mg under the tongue every 5 minutes as needed for chest pain If you are still having symptoms after 3 doses (15 minutes) call 911. 25 tablet 3     Omega-3 Fatty Acids (OMEGA-3 FISH OIL PO) Take 2 g by mouth daily       sertraline (ZOLOFT) 25 MG tablet Take 25 mg by mouth daily Patient taking half tablet daily       ALPRAZolam (XANAX) 0.25 MG tablet Take 1 tablet (0.25 mg) by mouth 3 times daily as needed for anxiety 10 tablet 0     MELATONIN PO Take 1 mg by mouth At Bedtime         ALLERGIES     Allergies   Allergen Reactions     Hmg-Coa-R Inhibitors Other (See Comments)     elevated liver enzymes       PAST MEDICAL HISTORY:  Past Medical History:   Diagnosis Date     Anxiety     post MI     CAD (coronary artery disease) 7/29/2015     Depression     post MI     MRSA infection     hand      Myocardial infarction (H) 7/2015     Vertigo     related miners        PAST SURGICAL HISTORY:  Past Surgical History:   Procedure Laterality Date     ANGIOPLASTY  7/17/2015    MAURISIO to mid circ, thrombectomy -MAURISIO to proximal, mid, distal RCA , successful balloon to 1st RPL done Resolute stents used procedure done at [facility]     INNER EAR SURGERY      redesign [facility]        FAMILY HISTORY:  Family History   Problem Relation Age of Onset     Coronary Artery Disease Father         at age 39, and other heart problem      Hyperlipidemia Father         ?      Hypertension Mother      Diabetes No family hx of      Cerebrovascular Disease No family hx of      Breast Cancer No family hx of      Colon Cancer No family hx of        SOCIAL HISTORY:  Social History     Socioeconomic History     Marital status:      Spouse name: None     Number of children: None  "    Years of education: None     Highest education level: None   Tobacco Use     Smoking status: Former Smoker     Types: Cigarettes     Quit date: 1990     Years since quittin.7     Smokeless tobacco: Never Used     Tobacco comment: smoked 1-2 pack week, 30 years   Vaping Use     Vaping Use: Never used   Substance and Sexual Activity     Alcohol use: No     Alcohol/week: 0.0 standard drinks     Drug use: No     Sexual activity: Never   Other Topics Concern     Parent/sibling w/ CABG, MI or angioplasty before 65F 55M? Yes     Caffeine Concern No     Sleep Concern No     Weight Concern No     Special Diet Yes     Comment: low fats     Exercise Yes     Comment: walking  miles day, swimming, ellyptical     Seat Belt Yes   Social History Narrative    , 2 kids, non smoker quit 22 yrs ago. No alcohol social occasional , works for a company to help elderly patient        Review of Systems:  Skin:          Eyes:         ENT:         Respiratory:  Negative       Cardiovascular:  chest pain;Negative;palpitations;edema;lightheadedness;dizziness;fatigue      Gastroenterology:        Genitourinary:         Musculoskeletal:         Neurologic:         Psychiatric:         Heme/Lymph/Imm:         Endocrine:  Negative        Physical Exam:  Vitals: /74 (BP Location: Left arm, Patient Position: Sitting)   Pulse 76   Ht 1.549 m (5' 1\")   Wt 60.6 kg (133 lb 9.6 oz)   SpO2 97%   BMI 25.24 kg/m      Constitutional:           Skin:             Head:           Eyes:           Lymph:      ENT:           Neck:           Respiratory:            Cardiac:                                                           GI:           Extremities and Muscular Skeletal:                 Neurological:           Psych:           CC  Referred Self,    Thank you for allowing me to participate in the care of your patient.      Sincerely,     Salty Lovett MD     [facility]

## 2022-10-03 NOTE — PROGRESS NOTES
HPI and Plan:   I had the pleasure of seeing Shelley Mccrary in Cardiology Clinic in followup.      She is a pleasant 72-year-old female with past medical history of inferolateral myocardial infarction in July 2015 when she was admitted to [facility] and had RCA intervention.  At that time, posterolateral branch with angioplasty was also performed.      Patient is doing well.  She denies any chest pain or shortness of breath.  Lately she sold her house in [place] and moved to [place] to be closer to her kids.  The move went well.  She remains active and continues to walk most days of the week and sometimes uphill without any chest pain or shortness of breath.    She had questions regarding lisinopril.  This is a blood pressure medication and add a low-dose of 5 mg/day.  We did refill it.  She was questioning if she has to be on it.  I reminded her that when she was not on it, blood pressures were high.  If she wants to go off it, I will start lowering the dose to half a tablet a day and monitor blood pressure closely at home and if it remains within normal limits, can stop it.  On the other hand if it goes above 1 3585, she should resume it.  She will think about it    I reviewed her lipid profile from December of last year.  Total cholesterol is 141 LDL 73.  She remains on atorvastatin 40 mg/day and tolerates it well.  Last potassium was normal as was creatinine    Last test nuclear study in 2019 revealed inferolateral scar without ischemia.  Exercise capacity was excellent.  No chest pain or EKG changes at that time.  Last echo revealed inferior inferolateral hypokinesis with a globally normal ejection fraction.     PHYSICAL EXAMINATION:   See below      IMPRESSION AND PLAN:   1.  Coronary artery disease, status post inferior wall myocardial infarction in 07/2015.  She is now free of chest pain and shortness of breath.   Continue present medications including beta-blockers aspirin atorvastatin and  lisinopril.     2. Hyperlipidemia  On atorvastatin 40 g daily LDL 73    3.  Hypertension on low-dose lisinopril    Overall, she is doing really well.  Since she is doing well for a while, I will now see her on every 2 years basis.  We can see her in the interim there are any new symptoms.  She will call me if there are any new complaints.         Sincerely,     LAINEY UQISPE MD        cc:   Fabiola Mahmood MD   [address]  [address]        Today's clinic visit entailed:    24 minutes spent on the date of the encounter doing chart review, review of test results, patient visit and documentation   Provider  Link to [facility] Help Grid         Orders Placed This Encounter   Procedures     Follow-Up with Cardiologist       Orders Placed This Encounter   Medications     lisinopril (ZESTRIL) 5 MG tablet     Sig: Take 1 tablet (5 mg) by mouth daily Appointment required for further refills     Dispense:  90 tablet     Refill:  3       Medications Discontinued During This Encounter   Medication Reason     lisinopril (ZESTRIL) 5 MG tablet Reorder         Encounter Diagnoses   Name Primary?     Benign essential hypertension      Coronary artery disease involving native coronary artery of native heart without angina pectoris Yes     Hyperlipidemia LDL goal <70        CURRENT MEDICATIONS:  Current Outpatient Medications   Medication Sig Dispense Refill     aspirin 81 MG tablet Take by mouth daily       atorvastatin (LIPITOR) 40 MG tablet Take 1 tablet (40 mg) by mouth daily 90 tablet 3     diphenhydrAMINE HCl (BENADRYL PO) Take by mouth as needed        lisinopril (ZESTRIL) 5 MG tablet Take 1 tablet (5 mg) by mouth daily Appointment required for further refills 90 tablet 3     metoprolol succinate ER (TOPROL XL) 25 MG 24 hr tablet TAKE 1 TABLET(25 MG) BY MOUTH DAILY 90 tablet 2     Multiple Vitamin (MULTI-VITAMINS) TABS Take 1 tablet by mouth daily        nitroglycerin  (NITROSTAT) 0.4 MG SL tablet Place 0.4 mg under the tongue every 5 minutes as needed for chest pain If you are still having symptoms after 3 doses (15 minutes) call 911. 25 tablet 3     Omega-3 Fatty Acids (OMEGA-3 FISH OIL PO) Take 2 g by mouth daily       sertraline (ZOLOFT) 25 MG tablet Take 25 mg by mouth daily Patient taking half tablet daily       ALPRAZolam (XANAX) 0.25 MG tablet Take 1 tablet (0.25 mg) by mouth 3 times daily as needed for anxiety 10 tablet 0     MELATONIN PO Take 1 mg by mouth At Bedtime         ALLERGIES     Allergies   Allergen Reactions     Hmg-Coa-R Inhibitors Other (See Comments)     elevated liver enzymes       PAST MEDICAL HISTORY:  Past Medical History:   Diagnosis Date     Anxiety     post MI     CAD (coronary artery disease) 2015     Depression     post MI     MRSA infection     hand      Myocardial infarction (H) 2015     Vertigo     related miners        PAST SURGICAL HISTORY:  Past Surgical History:   Procedure Laterality Date     ANGIOPLASTY  2015    MAURISIO to mid circ, thrombectomy -MUARISIO to proximal, mid, distal RCA , successful balloon to 1st RPL done Resolute stents used procedure done at [facility]     INNER EAR SURGERY      redesign [facility] of miners [place]        FAMILY HISTORY:  Family History   Problem Relation Age of Onset     Coronary Artery Disease Father         at age 39, and other heart problem      Hyperlipidemia Father         ?      Hypertension Mother      Diabetes No family hx of      Cerebrovascular Disease No family hx of      Breast Cancer No family hx of      Colon Cancer No family hx of        SOCIAL HISTORY:  Social History     Socioeconomic History     Marital status:      Spouse name: None     Number of children: None     Years of education: None     Highest education level: None   Tobacco Use     Smoking status: Former Smoker     Types: Cigarettes     Quit date: 1990     Years since quittin.7     Smokeless tobacco:  "Never Used     Tobacco comment: smoked 1-2 pack week, 30 years   Vaping Use     Vaping Use: Never used   Substance and Sexual Activity     Alcohol use: No     Alcohol/week: 0.0 standard drinks     Drug use: No     Sexual activity: Never   Other Topics Concern     Parent/sibling w/ CABG, MI or angioplasty before 65F 55M? Yes     Caffeine Concern No     Sleep Concern No     Weight Concern No     Special Diet Yes     Comment: low fats     Exercise Yes     Comment: walking  miles day, swimming, ellyptical     Seat Belt Yes   Social History Narrative    , 2 kids, non smoker quit 22 yrs ago. No alcohol social occasional , works for a company to help elderly patient        Review of Systems:  Skin:          Eyes:         ENT:         Respiratory:  Negative       Cardiovascular:  chest pain;Negative;palpitations;edema;lightheadedness;dizziness;fatigue      Gastroenterology:        Genitourinary:         Musculoskeletal:         Neurologic:         Psychiatric:         Heme/Lymph/Imm:         Endocrine:  Negative        Physical Exam:  Vitals: /74 (BP Location: Left arm, Patient Position: Sitting)   Pulse 76   Ht 1.549 m (5' 1\")   Wt 60.6 kg (133 lb 9.6 oz)   SpO2 97%   BMI 25.24 kg/m      Constitutional:           Skin:             Head:           Eyes:           Lymph:      ENT:           Neck:           Respiratory:            Cardiac:                                                           GI:           Extremities and Muscular Skeletal:                 Neurological:           Psych:           CC  Referred Self, MD  No address on file              "

## 2022-10-29 ENCOUNTER — HEALTH MAINTENANCE LETTER (OUTPATIENT)
Age: 72
End: 2022-10-29

## 2022-11-02 ENCOUNTER — DOCUMENTATION ONLY (OUTPATIENT)
Dept: LAB | Facility: CLINIC | Age: 72
End: 2022-11-02

## 2022-11-02 NOTE — PROGRESS NOTES
"Patient is scheduled to be seen in our lab 11/15/2022.  Appointment notes indicate \"fasting labs [place]o\".      NO current FUTURE or STANDING ORDERS have been placed.  Please place current FUTURE or STANDING orders as needed.      Note that orders have a maximum duration of one (1) year.    Thank you.  Please call if you have any questions.     "

## 2022-11-03 DIAGNOSIS — I42.8 OTHER CARDIOMYOPATHY (H): Primary | ICD-10-CM

## 2022-11-03 DIAGNOSIS — E78.5 HYPERLIPIDEMIA LDL GOAL <70: ICD-10-CM

## 2022-11-29 ENCOUNTER — LAB (OUTPATIENT)
Dept: LAB | Facility: CLINIC | Age: 72
End: 2022-11-29
Payer: MEDICARE

## 2022-11-29 DIAGNOSIS — E78.5 HYPERLIPIDEMIA LDL GOAL <70: ICD-10-CM

## 2022-11-29 DIAGNOSIS — I42.8 OTHER CARDIOMYOPATHY (H): ICD-10-CM

## 2022-11-29 LAB
ALBUMIN SERPL BCG-MCNC: 4 G/DL (ref 3.5–5.2)
ALP SERPL-CCNC: 75 U/L (ref 35–104)
ALT SERPL W P-5'-P-CCNC: 21 U/L (ref 10–35)
ANION GAP SERPL CALCULATED.3IONS-SCNC: 9 MMOL/L (ref 7–15)
AST SERPL W P-5'-P-CCNC: 31 U/L (ref 10–35)
BILIRUB SERPL-MCNC: 0.4 MG/DL
BUN SERPL-MCNC: 16.3 MG/DL (ref 8–23)
CALCIUM SERPL-MCNC: 9.6 MG/DL (ref 8.8–10.2)
CHLORIDE SERPL-SCNC: 106 MMOL/L (ref 98–107)
CHOLEST SERPL-MCNC: 154 MG/DL
CREAT SERPL-MCNC: 0.86 MG/DL (ref 0.51–0.95)
DEPRECATED HCO3 PLAS-SCNC: 27 MMOL/L (ref 22–29)
ERYTHROCYTE [DISTWIDTH] IN BLOOD BY AUTOMATED COUNT: 13.5 % (ref 10–15)
GFR SERPL CREATININE-BSD FRML MDRD: 70 ML/MIN/1.73M2
GLUCOSE SERPL-MCNC: 92 MG/DL (ref 70–99)
HCT VFR BLD AUTO: 39.9 % (ref 35–47)
HDLC SERPL-MCNC: 63 MG/DL
HGB BLD-MCNC: 13.1 G/DL (ref 11.7–15.7)
LDLC SERPL CALC-MCNC: 82 MG/DL
MCH RBC QN AUTO: 30.5 PG (ref 26.5–33)
MCHC RBC AUTO-ENTMCNC: 32.8 G/DL (ref 31.5–36.5)
MCV RBC AUTO: 93 FL (ref 78–100)
NONHDLC SERPL-MCNC: 91 MG/DL
PLATELET # BLD AUTO: 216 10E3/UL (ref 150–450)
POTASSIUM SERPL-SCNC: 5 MMOL/L (ref 3.4–5.3)
PROT SERPL-MCNC: 6.6 G/DL (ref 6.4–8.3)
RBC # BLD AUTO: 4.29 10E6/UL (ref 3.8–5.2)
SODIUM SERPL-SCNC: 142 MMOL/L (ref 136–145)
TRIGL SERPL-MCNC: 45 MG/DL
WBC # BLD AUTO: 5.8 10E3/UL (ref 4–11)

## 2022-11-29 PROCEDURE — 36415 COLL VENOUS BLD VENIPUNCTURE: CPT

## 2022-11-29 PROCEDURE — 80061 LIPID PANEL: CPT

## 2022-11-29 PROCEDURE — 80053 COMPREHEN METABOLIC PANEL: CPT

## 2022-11-29 PROCEDURE — 85027 COMPLETE CBC AUTOMATED: CPT

## 2022-12-13 ASSESSMENT — ENCOUNTER SYMPTOMS
SORE THROAT: 0
HEMATOCHEZIA: 0
PARESTHESIAS: 0
HEADACHES: 0
NAUSEA: 0
FREQUENCY: 0
HEMATURIA: 0
DYSURIA: 0
EYE PAIN: 0
MYALGIAS: 0
DIZZINESS: 1
ARTHRALGIAS: 0
JOINT SWELLING: 0
DIARRHEA: 0
BREAST MASS: 0
NERVOUS/ANXIOUS: 0
ABDOMINAL PAIN: 0
SHORTNESS OF BREATH: 0
FEVER: 0
COUGH: 0
HEARTBURN: 0
CONSTIPATION: 0
PALPITATIONS: 0
WEAKNESS: 0
CHILLS: 0

## 2022-12-13 ASSESSMENT — ACTIVITIES OF DAILY LIVING (ADL): CURRENT_FUNCTION: NO ASSISTANCE NEEDED

## 2022-12-16 ENCOUNTER — OFFICE VISIT (OUTPATIENT)
Dept: INTERNAL MEDICINE | Facility: CLINIC | Age: 72
End: 2022-12-16
Payer: MEDICARE

## 2022-12-16 VITALS
RESPIRATION RATE: 18 BRPM | HEIGHT: 61 IN | TEMPERATURE: 98.6 F | WEIGHT: 133 LBS | SYSTOLIC BLOOD PRESSURE: 112 MMHG | DIASTOLIC BLOOD PRESSURE: 62 MMHG | HEART RATE: 76 BPM | BODY MASS INDEX: 25.11 KG/M2 | OXYGEN SATURATION: 98 %

## 2022-12-16 DIAGNOSIS — H61.23 BILATERAL IMPACTED CERUMEN: ICD-10-CM

## 2022-12-16 DIAGNOSIS — I25.10 CORONARY ARTERY DISEASE INVOLVING NATIVE CORONARY ARTERY OF NATIVE HEART WITHOUT ANGINA PECTORIS: Primary | ICD-10-CM

## 2022-12-16 DIAGNOSIS — Z12.11 SCREEN FOR COLON CANCER: ICD-10-CM

## 2022-12-16 DIAGNOSIS — E78.5 HYPERLIPIDEMIA LDL GOAL <70: ICD-10-CM

## 2022-12-16 DIAGNOSIS — I10 BENIGN ESSENTIAL HYPERTENSION: ICD-10-CM

## 2022-12-16 DIAGNOSIS — I42.8 OTHER CARDIOMYOPATHY (H): ICD-10-CM

## 2022-12-16 PROCEDURE — 99213 OFFICE O/P EST LOW 20 MIN: CPT | Performed by: NURSE PRACTITIONER

## 2022-12-16 RX ORDER — METOPROLOL SUCCINATE 25 MG/1
25 TABLET, EXTENDED RELEASE ORAL DAILY
Qty: 90 TABLET | Refills: 3 | Status: SHIPPED | OUTPATIENT
Start: 2022-12-16 | End: 2023-12-22

## 2022-12-16 RX ORDER — ATORVASTATIN CALCIUM 40 MG/1
40 TABLET, FILM COATED ORAL DAILY
Qty: 90 TABLET | Refills: 3 | Status: SHIPPED | OUTPATIENT
Start: 2022-12-16 | End: 2023-12-19

## 2022-12-16 ASSESSMENT — ENCOUNTER SYMPTOMS
HEMATURIA: 0
HEMATOCHEZIA: 0
FREQUENCY: 0
JOINT SWELLING: 0
FEVER: 0
DIZZINESS: 1
CHILLS: 0
COUGH: 0
ARTHRALGIAS: 0
PALPITATIONS: 0
ABDOMINAL PAIN: 0
NAUSEA: 0
NERVOUS/ANXIOUS: 0
CONSTIPATION: 0
HEADACHES: 0
PARESTHESIAS: 0
DIARRHEA: 0
SHORTNESS OF BREATH: 0
EYE PAIN: 0
HEARTBURN: 0
SORE THROAT: 0
MYALGIAS: 0
WEAKNESS: 0
DYSURIA: 0
BREAST MASS: 0

## 2022-12-16 ASSESSMENT — PATIENT HEALTH QUESTIONNAIRE - PHQ9
SUM OF ALL RESPONSES TO PHQ QUESTIONS 1-9: 0
SUM OF ALL RESPONSES TO PHQ QUESTIONS 1-9: 0
10. IF YOU CHECKED OFF ANY PROBLEMS, HOW DIFFICULT HAVE THESE PROBLEMS MADE IT FOR YOU TO DO YOUR WORK, TAKE CARE OF THINGS AT HOME, OR GET ALONG WITH OTHER PEOPLE: NOT DIFFICULT AT ALL

## 2022-12-16 ASSESSMENT — ANXIETY QUESTIONNAIRES
5. BEING SO RESTLESS THAT IT IS HARD TO SIT STILL: NOT AT ALL
GAD7 TOTAL SCORE: 3
8. IF YOU CHECKED OFF ANY PROBLEMS, HOW DIFFICULT HAVE THESE MADE IT FOR YOU TO DO YOUR WORK, TAKE CARE OF THINGS AT HOME, OR GET ALONG WITH OTHER PEOPLE?: NOT DIFFICULT AT ALL
3. WORRYING TOO MUCH ABOUT DIFFERENT THINGS: SEVERAL DAYS
GAD7 TOTAL SCORE: 3
1. FEELING NERVOUS, ANXIOUS, OR ON EDGE: SEVERAL DAYS
6. BECOMING EASILY ANNOYED OR IRRITABLE: SEVERAL DAYS
7. FEELING AFRAID AS IF SOMETHING AWFUL MIGHT HAPPEN: NOT AT ALL
2. NOT BEING ABLE TO STOP OR CONTROL WORRYING: NOT AT ALL
7. FEELING AFRAID AS IF SOMETHING AWFUL MIGHT HAPPEN: NOT AT ALL
4. TROUBLE RELAXING: NOT AT ALL
IF YOU CHECKED OFF ANY PROBLEMS ON THIS QUESTIONNAIRE, HOW DIFFICULT HAVE THESE PROBLEMS MADE IT FOR YOU TO DO YOUR WORK, TAKE CARE OF THINGS AT HOME, OR GET ALONG WITH OTHER PEOPLE: NOT DIFFICULT AT ALL
GAD7 TOTAL SCORE: 3

## 2022-12-16 ASSESSMENT — ACTIVITIES OF DAILY LIVING (ADL): CURRENT_FUNCTION: NO ASSISTANCE NEEDED

## 2022-12-16 NOTE — PROGRESS NOTES
"adry ear irrigation done with good results.10 am to 10:40 Rowena MARAVILLA LPN.    Assessment & Plan     Coronary artery disease involving native coronary artery of native heart without angina pectoris  Stable     Screen for colon cancer  Willing to do   Colonoscopy 2017  - ALAYNA([employer]); Future    Other cardiomyopathy (H)  Stable   - metoprolol succinate ER (TOPROL XL) 25 MG 24 hr tablet; Take 1 tablet (25 mg) by mouth daily ### DO NOT FILL NOW.  Please update patient's profile to reflect additional refills.  ####    Hyperlipidemia LDL goal <70  Tolerating medication   Lab great   - atorvastatin (LIPITOR) 40 MG tablet; Take 1 tablet (40 mg) by mouth daily    Benign essential hypertension  In good range     Bilateral impacted cerumen  Ear wash        20 minutes spent on the date of the encounter doing chart review, history and exam, documentation and further activities per the note       BMI:   Estimated body mass index is 25.13 kg/m  as calculated from the following:    Height as of this encounter: 1.549 m (5' 1\").    Weight as of this encounter: 60.3 kg (133 lb).       Patient Instructions   Medication refilled           Return in about 1 year (around 12/16/2023).    ALEJANDRA Butterfield CNP  [facility] is a 72 year old, presenting for the following health issues:  Med check too early for wellness    Healthy Habits:     In general, how would you rate your overall health?  Good    Frequency of exercise:  6-7 days/week    Duration of exercise:  45-60 minutes    Do you usually eat at least 4 servings of fruit and vegetables a day, include whole grains    & fiber and avoid regularly eating high fat or \"junk\" foods?  No    Taking medications regularly:  Yes    Medication side effects:  None    Ability to successfully perform activities of daily living:  No assistance needed    Home Safety:  No safety concerns identified    Hearing Impairment:  Difficulty " "following a conversation in a noisy restaurant or crowded room, feel that people are mumbling or not speaking clearly, need to ask people to speak up or repeat themselves, find that men's voices are easier to understand than woman's and difficulty understanding soft or whispered speech    In the past 6 months, have you been bothered by leaking of urine?  No    In general, how would you rate your overall mental or emotional health?  Good      PHQ-2 Total Score: 0    Additional concerns today:  No             Review of Systems   Constitutional: Negative for chills and fever.   HENT: Negative for congestion, ear pain, hearing loss and sore throat.    Eyes: Positive for visual disturbance. Negative for pain.   Respiratory: Negative for cough and shortness of breath.    Cardiovascular: Negative for chest pain, palpitations and peripheral edema.   Gastrointestinal: Negative for abdominal pain, constipation, diarrhea, heartburn, hematochezia and nausea.   Breasts:  Negative for tenderness, breast mass and discharge.   Genitourinary: Negative for dysuria, frequency, genital sores, hematuria, pelvic pain, vaginal bleeding and vaginal discharge.   Musculoskeletal: Negative for arthralgias, joint swelling and myalgias.   Skin: Negative for rash.   Neurological: Positive for dizziness. Negative for weakness, headaches and paresthesias.   Psychiatric/Behavioral: The patient is not nervous/anxious.       Has menieres   Gets worse with ear wax and feels like that now   Tiny ear canals     Goes to [place] for a couple months in about 10 days           Objective    /62   Pulse 76   Temp 98.6  F (37  C) (Oral)   Resp 18   Ht 1.549 m (5' 1\")   Wt 60.3 kg (133 lb)   SpO2 98%   BMI 25.13 kg/m    Body mass index is 25.13 kg/m .  Physical Exam   GENERAL: alert and no distress  HENT: ear canals tiny and tight with cerumen , nose and mouth without ulcers or lesions  RESP: lungs clear to auscultation - no rales, rhonchi or " wheezes  CV: regular rate and rhythm, normal S1 S2, no S3 or S4, no murmur, click or rub, no peripheral edema and peripheral pulses strong  ABDOMEN: soft, nontender, no hepatosplenomegaly, no masses and bowel sounds normal  MS: no gross musculoskeletal defects noted, no edema  NEURO: Normal strength and tone, mentation intact and speech normal  PSYCH: mentation appears normal, affect normal/bright    Reviewed lab                 Answers for HPI/ROS submitted by the patient on 12/16/2022  If you checked off any problems, how difficult have these problems made it for you to do your work, take care of things at home, or get along with other people?: Not difficult at all  PHQ9 TOTAL SCORE: 0  KALI 7 TOTAL SCORE: 3

## 2022-12-16 NOTE — NURSING NOTE
"Chief Complaint   Patient presents with     Recheck Medication     Pt has had labs done     initial /62   Pulse 76   Temp 98.6  F (37  C) (Oral)   Resp 18   Ht 1.549 m (5' 1\")   Wt 60.3 kg (133 lb)   SpO2 98%   BMI 25.13 kg/m   Estimated body mass index is 25.13 kg/m  as calculated from the following:    Height as of this encounter: 1.549 m (5' 1\").    Weight as of this encounter: 60.3 kg (133 lb)..  bp completed using cuff size regular  SRIDHAR MARAVILLA LPN  "

## 2022-12-27 ENCOUNTER — OFFICE VISIT (OUTPATIENT)
Dept: URGENT CARE | Facility: URGENT CARE | Age: 72
End: 2022-12-27
Payer: MEDICARE

## 2022-12-27 VITALS
DIASTOLIC BLOOD PRESSURE: 82 MMHG | WEIGHT: 131 LBS | RESPIRATION RATE: 16 BRPM | BODY MASS INDEX: 24.75 KG/M2 | TEMPERATURE: 98 F | SYSTOLIC BLOOD PRESSURE: 145 MMHG | HEART RATE: 74 BPM | OXYGEN SATURATION: 99 %

## 2022-12-27 DIAGNOSIS — J01.90 ACUTE SINUSITIS WITH COEXISTING CONDITION, NEED PROPHYLACTIC TREATMENT: Primary | ICD-10-CM

## 2022-12-27 DIAGNOSIS — J02.9 SORETHROAT: ICD-10-CM

## 2022-12-27 DIAGNOSIS — R09.82 POST-NASAL DRIP: ICD-10-CM

## 2022-12-27 LAB
DEPRECATED S PYO AG THROAT QL EIA: NEGATIVE
FLUAV AG SPEC QL IA: NEGATIVE
FLUBV AG SPEC QL IA: NEGATIVE
GROUP A STREP BY PCR: NOT DETECTED
SARS-COV-2 RNA RESP QL NAA+PROBE: NEGATIVE

## 2022-12-27 PROCEDURE — 99213 OFFICE O/P EST LOW 20 MIN: CPT | Performed by: PHYSICIAN ASSISTANT

## 2022-12-27 PROCEDURE — 87651 STREP A DNA AMP PROBE: CPT | Performed by: PHYSICIAN ASSISTANT

## 2022-12-27 PROCEDURE — U0003 INFECTIOUS AGENT DETECTION BY NUCLEIC ACID (DNA OR RNA); SEVERE ACUTE RESPIRATORY SYNDROME CORONAVIRUS 2 (SARS-COV-2) (CORONAVIRUS DISEASE [COVID-19]), AMPLIFIED PROBE TECHNIQUE, MAKING USE OF HIGH THROUGHPUT TECHNOLOGIES AS DESCRIBED BY CMS-2020-01-R: HCPCS | Performed by: PHYSICIAN ASSISTANT

## 2022-12-27 PROCEDURE — U0005 INFEC AGEN DETEC AMPLI PROBE: HCPCS | Performed by: PHYSICIAN ASSISTANT

## 2022-12-27 PROCEDURE — 87804 INFLUENZA ASSAY W/OPTIC: CPT | Performed by: PHYSICIAN ASSISTANT

## 2022-12-27 RX ORDER — FLUTICASONE PROPIONATE 50 MCG
2 SPRAY, SUSPENSION (ML) NASAL DAILY
Qty: 16 G | Refills: 0 | Status: SHIPPED | OUTPATIENT
Start: 2022-12-27 | End: 2023-09-28

## 2022-12-27 RX ORDER — CETIRIZINE HYDROCHLORIDE 10 MG/1
10 TABLET ORAL EVERY EVENING
Qty: 30 TABLET | Refills: 0 | Status: SHIPPED | OUTPATIENT
Start: 2022-12-27

## 2022-12-27 RX ORDER — AMOXICILLIN 875 MG
875 TABLET ORAL 2 TIMES DAILY
Qty: 20 TABLET | Refills: 0 | Status: SHIPPED | OUTPATIENT
Start: 2022-12-27 | End: 2023-01-06

## 2022-12-27 NOTE — PROGRESS NOTES
Patient presents with:  Pharyngitis  URI    (J01.90) Acute sinusitis with coexisting condition, need prophylactic treatment  (primary encounter diagnosis)  Comment:   Plan: amoxicillin (AMOXIL) 875 MG tablet, fluticasone        (FLONASE) 50 MCG/ACT nasal spray          Use flonase 2 sprays each nostril at bedtime for minimum of 2 weeks.      (J02.9) Sorethroat  Comment:   Plan: Streptococcus A Rapid Screen w/Reflex to PCR -         Clinic Collect, Symptomatic COVID-19 Virus         (Coronavirus) by PCR Nose, Influenza A & B         Antigen - Clinic Collect, Group A Streptococcus        PCR Throat Swab            (R09.82) Post-nasal drip  Comment:   Plan: cetirizine (ZYRTEC) 10 MG tablet            Salt water gargles.      Tylenol or ibuprofen as needed.      Saline nasal spray          SUBJECTIVE:   Shelley Mccrary is a 72 year old female who presents today with sore throat, runny nose, cough onset 4 days ago.      SH: leaving for her winter home in [place] tomorrow, driving.       Past Medical History:   Diagnosis Date     Anxiety     post MI     CAD (coronary artery disease) 7/29/2015     Depression     post MI     MRSA infection     hand      Myocardial infarction (H) 7/2015     Vertigo     related miners          Current Outpatient Medications   Medication Sig Dispense Refill     Multiple Vitamins-Iron (DAILY-LEXY/IRON/BETA-CAROTENE) TABS TAKE 1 TABLET BY MOUTH DAILY. (Patient not taking: Reported on 10/19/2020) 30 tablet 7     Social History     Tobacco Use     Smoking status: Never Smoker     Smokeless tobacco: Never Used   Substance Use Topics     Alcohol use: Not on file     Family History   Problem Relation Age of Onset     Diabetes Mother      Diabetes Father          ROS:    10 point ROS of systems including Constitutional, Eyes, Respiratory, Cardiovascular, Gastroenterology, Genitourinary, Integumentary, Muscularskeletal, Psychiatric ,neurological were all negative except for pertinent positives noted  in my HPI       OBJECTIVE:  BP (!) 145/82   Pulse 74   Temp 98  F (36.7  C) (Oral)   Resp 16   Wt 59.4 kg (131 lb)   SpO2 99%   BMI 24.75 kg/m    Physical Exam:  GENERAL APPEARANCE: healthy, alert and no distress  EYES: EOMI,  PERRL, conjunctiva clear  HENT: ear canals and TM's normal.  Nose and mouth without ulcers, erythema or lesions  HENT: nasal turbinates boggy with bluish hue and rhinorrhea yellow  NECK: supple, nontender, no lymphadenopathy  RESP: lungs clear to auscultation - no rales, rhonchi or wheezes  CV: regular rates and rhythm, normal S1 S2, no murmur noted  ABDOMEN:  soft, nontender, no HSM or masses and bowel sounds normal  NEURO: Normal strength and tone, sensory exam grossly normal,  normal speech and mentation  SKIN: no suspicious lesions or rashes    Results for orders placed or performed in visit on 12/27/22   Streptococcus A Rapid Screen w/Reflex to PCR - Clinic Collect     Status: Normal    Specimen: Throat; Swab   Result Value Ref Range    Group A Strep antigen Negative Negative   Influenza A & B Antigen - Clinic Collect     Status: Normal    Specimen: Nose; Swab   Result Value Ref Range    Influenza A antigen Negative Negative    Influenza B antigen Negative Negative    Narrative    Test results must be correlated with clinical data. If necessary, results should be confirmed by a molecular assay or viral culture.

## 2022-12-27 NOTE — PATIENT INSTRUCTIONS
(J01.90) Acute sinusitis with coexisting condition, need prophylactic treatment  (primary encounter diagnosis)  Comment:   Plan: amoxicillin (AMOXIL) 875 MG tablet, fluticasone        (FLONASE) 50 MCG/ACT nasal spray          Use flonase 2 sprays each nostril at bedtime for minimum of 2 weeks.      (J02.9) Sorethroat  Comment:   Plan: Streptococcus A Rapid Screen w/Reflex to PCR -         Clinic Collect, Symptomatic COVID-19 Virus         (Coronavirus) by PCR Nose, Influenza A & B         Antigen - Clinic Collect, Group A Streptococcus        PCR Throat Swab            (R09.82) Post-nasal drip  Comment:   Plan: cetirizine (ZYRTEC) 10 MG tablet            Salt water gargles.      Tylenol or ibuprofen as needed.      Saline nasal spray

## 2023-01-08 LAB — NONINV COLON CA DNA+OCC BLD SCRN STL QL: NEGATIVE

## 2023-04-02 ENCOUNTER — HEALTH MAINTENANCE LETTER (OUTPATIENT)
Age: 73
End: 2023-04-02

## 2023-06-12 ENCOUNTER — MYC MEDICAL ADVICE (OUTPATIENT)
Dept: CARDIOLOGY | Facility: CLINIC | Age: 73
End: 2023-06-12
Payer: MEDICARE

## 2023-06-13 NOTE — TELEPHONE ENCOUNTER
Patient returned call and left voicemail message with update blood pressure reading.      Last Blood Pressure: 145/82  Last Heart Rate: 74  Date: 12/27/22  Location: Other Specialty    Today's Blood Pressure: 122/72  Location: Home BP    Patient reported blood pressure updated in Epic. Blood pressure falls within [place] Community Measures guidelines.  Patient will follow up as previously advised. UMAIR Garcia

## 2023-06-19 NOTE — LETTER
11/7/2017         RE: Shelley Mccrary  [address]  [place] [address]        Dear Colleague,    Thank you for referring your patient, Shelley Mccrary, to the [facility] - PRIMARY CARE SKIN. Please see a copy of my visit note below.    [facility] PRIMARY CARE SKIN    CC : skin cancer screening (full-body)  SUBJECTIVE:                                                    Shelley Mccrary is a 67 year old female who presents to clinic today for a full-body skin exam because of a spot on the chest.    Bothersome lesions noticed by the patient or other skin concerns :  Issue One : A lesion on chest appears as a non-healing pimple.  Onset : 4 months.  Enlarging : NO.  Bleeding : NO  Itchy or irritating : NO.  Pain or tenderness : YES.  Changing color : NO.  Issue Two : There are itchy, brown, scaly lesions on the back.    Personal history of skin cancer : NO - history of actinic keratoses.  Family history of skin cancer : NO.    Sun Exposure History  Previous history of significant sun exposure: YES    Occupation : retired.    Refer to electronic medical record (EMR) for past medical history and medications.    INTEGUMENTARY/SKIN: POSITIVE for non-healing lesion  ROS : 14 point review of systems was negative except the symptoms listed above in the HPI.    This document serves as a record of the services and decisions personally performed and made by La Thomas MD. It was created on her behalf by Thaddeus Dietrich, a trained medical scribe.  The creation of this document is based on the scribe's personal observations and the provider's statements to the medical scribe.  Thaddeus Dietrich, November 7, 2017 3:29 PM      OBJECTIVE:                                                    GENERAL: healthy, alert and no distress  SKIN: Baird Skin Type - III.  This patient was examined from the top of the head to the bottom of the feet  including scalp, face, neck, back, chest, breasts, buttocks,  "  Physical Exam  /68   Pulse 80   Temp 98 °F (36.7 °C) (Oral)   Ht 5' 5" (1.651 m)   Wt 91.1 kg (200 lb 13.4 oz)   SpO2 99%   BMI 33.42 kg/m²      Office Visit    Patient Name: Vijay Rodriguez Jr.    : 1963  MRN: [id number]      Assessment/Plan:  Vijay Rodriguez Jr. is a 59 y.o. male who presents today for :    -Controlled type 2 diabetes mellitus without complication, without long-term current use of insulin  -     Hemoglobin A1C; Future; Expected date: 2023  -     CBC Without Differential; Future; Expected date: 2023  -     Comprehensive Metabolic Panel; Future; Expected date: 2023  -     Microalbumin/Creatinine Ratio, Urine; Future; Expected date: 2023  -     metFORMIN (GLUCOPHAGE-XR) 500 MG ER 24hr tablet; Take 2 tablets (1,000 mg total) by mouth 2 (two) times daily with meals.   -     glipiZIDE (GLUCOTROL) 10 MG tablet; Take 1 tablet (10 mg total) by mouth 2 (two) times daily with meals.   -HLD associated with type 2 DM  -     Lipid Panel; Future; Expected date: 2023  -previous A1c reviewed:   Lab Results   Component Value Date    HGBA1C 7.3 (H) 06/15/2023     -aim for goal of A1c<7%, continue current medication regimen - eye cam today  -reviewed with patient about routine diabetic care  -weight loss and regular physical excercise      -HTN  -     amLODIPine (NORVASC) 10 MG tablet; Take 1 tablet (10 mg total) by mouth once daily.  -Obesity (BMI 30-39.9)  -Tobacco use  -continue current medication regimen  -DASH diet, regular cardiovascular exercises  -weight loss  -smoking cessation    Non-seasonal allergic rhinitis due to other allergic trigger  -     azelastine (ASTELIN) 137 mcg (0.1 %) nasal spray; 1 spray (137 mcg total) by Nasal route 2 (two) times daily.  Dispense: 30 mL; Refill: 11  -     fluticasone propionate (FLONASE) 50 mcg/actuation nasal spray; 1 spray (50 mcg total) by Each Nostril route 2 (two) times daily.  Dispense: 48 mL; Refill: " "both arms, both legs, both hands, both feet, all 10 fingers and all 10 toes. The dermatoscope was used to help evaluate pigmented lesions.  Skin Pertinent Findings:  Right postauricular area : \"stuck on\" appearing papules, raised, brown, coarse-textured, round lesion(s) most consistent with seborrheic keratoses.    Face : Scattered brown, macule(s) most consistent with benign solar lentigo.    Arms, Back : Scattered \"stuck on\" appearing papules, raised, brown, coarse-textured, round lesion(s) most consistent with seborrheic keratoses. Scattered brown, macule(s) most consistent with benign solar lentigo.    Legs : Multiple, brown, macule(s) most consistent with benign solar lentigo.    Posterior legs : Scattered \"stuck on\" appearing papules, raised, brown, coarse-textured, round lesion(s) most consistent with seborrheic keratoses.    Significant Findings:  Chest, 2 cm left of midline, at T2 : 3 mm in size, erythematous, indurated lesion with small hyperkeratotic center. ? Basal cell carcinoma ? Squamous cell carcinoma ? Hypertrophic actinic keratosis ? Other    Face : 3 mm - 5 mm in size, erythematous, scaly, non-indurated lesion(s) most consistent with actinic keratoses.  Name : Liquid Nitrogen Cry-Ac Cryotherapy.  Indication : Pre-malignant lesion.  Location(s) : right lateral cheek - x1, right upper cheek - x1, mid-forehead - x1, right side of nose - x1, right lateral temple - x1.  Completed by : La Thomas MD  Note : Discussed natural history of lesion and treatment options. Prior to treatment, we discussed inflammation, tenderness post-procedure, the healing process, and the risks of pain, infection, scarring, blistering, and hypo-/hyperpigmentation after healing. Explained that these lesions may grow back and may need additional treatment or re-treatment. The patient expressed a desire to proceed with cryotherapy.    The lesion(s) was treated with liquid nitrogen Cry-Ac, five second freeze repeated twice " 11    -Hypothyroidism  -     levothyroxine (SYNTHROID) 50 MCG tablet; Take 1 tablet (50 mcg total) by mouth before breakfast.   -     TSH; Future; Expected date: 06/19/2023  -     T4, Free; Future; Expected date: 06/19/2023  -asymptomatic, continue current medication and monitor    Vitamin D deficiency  -     VITAMIN D2 1,250 mcg (50,000 unit) capsule; Take 1 capsule (50,000 Units total) by mouth every 7 days.  Dispense: 12 capsule; Refill: 3    Erectile dysfunction  -     tadalafiL (CIALIS) 20 MG Tab; Take 1 tablet (20 mg total) by mouth once daily.  Dispense: 30 tablet; Refill: 5  -stable, continue current regimen         Follow up 6 mo      This note was created by combination of typed  and MModal dictation.  Transcription errors may be present.  If there are any questions, please contact me.      ----------------------------------------------------------------------------------------------------------------------      HPI:  Patient Care Team:  Isidro Prado MD as PCP - General (Family Medicine)  Sasha Coker MA as Care Coordinator  Jw Roberts MD as Consulting Physician (Ophthalmology)    Vijay is a 59 y.o. male with      Patient Active Problem List   Diagnosis    Elbow pain    -HTN    Allergic rhinitis    Stress    Arm numbness left    -Hypothyroidism    -Obesity (BMI 30-39.9)    -Tobacco use    -Controlled type 2 diabetes mellitus without complication, without long-term current use of insulin    Chronic maxillary sinusitis    Knee pain    Hepatitis C antibody positive in blood    -HLD associated with type 2 DM       Vijay presents today for follow up lab results      DM - A1c improving, but is still slightly above goal of <7%. Doing well on Metformin/Glipizide with good BG readings at home. No polyuria/polydipsia. No foot numbness/tingling/vision changes/hypoglycemia. His BP is controlled on current regimen.     Hemoglobin A1C   Date Value Ref Range Status   06/15/2023 7.3 (H) <5.7  "with a pause to allow for the area to thaw. If this lesion should recur, then it needs to be re-evaluated.    Patient tolerated the procedure well and left in good condition.  Total number of lesions treated : 5.    Diagnostic Test Results:  none           ASSESSMENT:                                                      Encounter Diagnoses   Name Primary?     Neoplasm of uncertain behavior of skin Yes     AK (actinic keratosis)      History of actinic keratoses      Skin tanning due to exposure to ultraviolet light      Seborrheic keratoses      Solar lentigo          PLAN:                                                    Patient Instructions   FUTURE APPOINTMENTS  Follow up in 1 year(s) for a full-body skin cancer screening.    SUN PROTECTION INSTRUCTIONS  Sun damage can lead to skin cancer and premature aging of the skin.      The best way to protect from sun damage to your skin is to avoid the sun during peak hours (10 am - 2 pm) even on overcast days.      Use UPF sun-protective clothing, which while more expensive initially provides longer lasting coverage without having to worry about remembering to re-apply.  1. Wear a wide-brimmed hat and sunglasses.   2. Wear sun-protective clothing.  Namely and other [employer] make sun protective clothing that are stylish, comfortable and cool. Crush on original products and other [employer] make UV arm sleeves suitable for golfing, gardening and other activities.      Sunscreen instructions:  1. Use sunscreens with Zinc Oxide, Titanium Dioxide or Avobenzone to protect from UVA rays.  2. Use SPF 30-50+ to protect from UVB rays.  3. Re-apply every 2 hours even if water resistant.  4. Apply on your face every day even when cloudy and even in the winter. UVA \"aging rays\" penetrate window glass and are just as strong in the winter as in the summer.    Product Recommendations:    Good examples include: Blue Domitilaard, EltaMD, Solbar    Good daily moisturizers with SPF: Vanicream, " % of total Hgb Final     Comment:     For someone without known diabetes, a hemoglobin A1c  value of 6.5% or greater indicates that they may have   diabetes and this should be confirmed with a follow-up   test.     For someone with known diabetes, a value <7% indicates   that their diabetes is well controlled and a value   greater than or equal to 7% indicates suboptimal   control. A1c targets should be individualized based on   duration of diabetes, age, comorbid conditions, and   other considerations.     Currently, no consensus exists regarding use of  hemoglobin A1c for diagnosis of diabetes for children.         01/09/2023 8.4 (H) <5.7 % of total Hgb Final     Comment:     For someone without known diabetes, a hemoglobin A1c  value of 6.5% or greater indicates that they may have   diabetes and this should be confirmed with a follow-up   test.     For someone with known diabetes, a value <7% indicates   that their diabetes is well controlled and a value   greater than or equal to 7% indicates suboptimal   control. A1c targets should be individualized based on   duration of diabetes, age, comorbid conditions, and   other considerations.     Currently, no consensus exists regarding use of  hemoglobin A1c for diagnosis of diabetes for children.         03/21/2022 9.6 (H) <5.7 % of total Hgb Final     Comment:     For someone without known diabetes, a hemoglobin A1c  value of 6.5% or greater indicates that they may have   diabetes and this should be confirmed with a follow-up   test.     For someone with known diabetes, a value <7% indicates   that their diabetes is well controlled and a value   greater than or equal to 7% indicates suboptimal   control. A1c targets should be individualized based on   duration of diabetes, age, comorbid conditions, and   other considerations.     Currently, no consensus exists regarding use of  hemoglobin A1c for diagnosis of diabetes for children.             Hypothyroidism -  "CeraVe.    For sensitive skin, consider : SkinMedica Essential Defense Mineral Shield Broad Spectrum SPF 35      Never use tanning beds. Tanning beds are associated with much higher risks of skin cancer.    All tanning damages the skin. Aim for ivory skin year round and you will have less trouble with your skin in years to come. There is no merit in getting \"a base tan\" before a warm weather vacation, as any tanning indicates your body's response to sun damage.    Stop smoking. Smokers have higher rates of skin cancer and also have premature skin wrinkling.    FYI  You should use about 3 tablespoons of sunscreen to protect your whole body. Thus a typical eight ounce bottle of sunscreen should last 4 applications. Remember, that the SPF rating is compromised if you don t apply enough. Most people only apply 1/2 - 1/3 of the amount they need. Also don t forget areas such as your ears, feet, upper back and harder to reach places. Keep in mind that these amounts should be increased for larger body sizes.    Sunscreens with titanium dioxide and/or zinc oxide in the active ingredients are physical blockers as opposed to chemical blockers. Chemical-free sunscreens should not irritate the skin.    Spray-on sunscreens may be used for touch-up application only, not as a base layer. Also, use with caution around small children due to inhalation risk.    Avoid retinyl palmitate products.    Avoid combination products that include both sunscreen and insect repellant, as sunscreen should be applied every 2 hours, but insect repellant should not be applied as frequently.    SPF means sun protection factor, which is just the degree to which the sunscreen can protect against UVB rays. There is no rating system for UVA rays. SPF is calculated as the time skin will burn when sunscreen is applied vs. skin without sunscreen.    Water resistant sunscreens should be re-applied every 1-2 hours.    For more " tolerating medication well - TSH slightly elevated from prior but T4 wnl and is he asymptomatic - prefers to stay at same dose and monitor.     Lab Results   Component Value Date    TSH 6.22 (H) 06/15/2023       Additional ROS      CONST: no fever, no activity change, +weight decrease  EYES: no vision change.   ENT: no sore throat. No dysphagia. +occasional dry throat when he sleeps w/ mouth open, no snoring  CV: no CP with exertion  RESP: no SOB  GI: no N/V/diarrhea/constipation  : no urinary concerns  MSK: no new myalgias or arthralgias.   SKIN: no new rashes  NEURO: no focal deficits.   PSYCH: no new issues.   ENDOCRINE: no polyuria.             Patient Active Problem List   Diagnosis    Elbow pain    -HTN    Allergic rhinitis    Stress    Arm numbness left    -Hypothyroidism    -Obesity (BMI 30-39.9)    -Tobacco use    -Controlled type 2 diabetes mellitus without complication, without long-term current use of insulin    Chronic maxillary sinusitis    Knee pain    Hepatitis C antibody positive in blood    -HLD associated with type 2 DM       Current Medications  Medications reviewed/updated.     Current Outpatient Medications on File Prior to Visit   Medication Sig Dispense Refill    atorvastatin (LIPITOR) 20 MG tablet Take 1 tablet (20 mg total) by mouth once daily. 90 tablet 3    EPINEPHrine (EPIPEN) 0.3 mg/0.3 mL AtIn TAKE 1 AUTO(S) AS NEEDED BY INJECTION ROUTE. Strength: 0.3 mg/0.3 mL 2 each 1    nicotine (NICODERM CQ) 14 mg/24 hr Place 1 patch onto the skin once daily. 21 patch 15    [DISCONTINUED] amLODIPine (NORVASC) 10 MG tablet Take 1 tablet (10 mg total) by mouth once daily. 90 tablet 3    [DISCONTINUED] azelastine (ASTELIN) 137 mcg (0.1 %) nasal spray 1 spray (137 mcg total) by Nasal route 2 (two) times daily. 30 mL 11    [DISCONTINUED] fluticasone propionate (FLONASE) 50 mcg/actuation nasal spray 1 spray (50 mcg total) by Each Nostril route 2 (two) times daily. 48 mL 11    [DISCONTINUED] glipiZIDE  (GLUCOTROL) 10 MG tablet TAKE 1 TABLET BY MOUTH TWICE DAILY WITH MEALS 180 tablet 0    [DISCONTINUED] levothyroxine (SYNTHROID) 50 MCG tablet Take 1 tablet (50 mcg total) by mouth before breakfast. 10 tablet 0    [DISCONTINUED] metFORMIN (GLUCOPHAGE-XR) 500 MG ER 24hr tablet Take 2 tablets (1,000 mg total) by mouth 2 (two) times daily with meals. Followup Dr.T Prado SEPTEMBER 2023 for more refills, call to schedule/get labs 1wk before doctor's appointment (ordered). May schedule a VIDEO or TELEPHONE visit if desired 360 tablet 0    [DISCONTINUED] tadalafiL (CIALIS) 20 MG Tab Take 1 tablet (20 mg total) by mouth once daily. 30 tablet 5    [DISCONTINUED] VITAMIN D2 1,250 mcg (50,000 unit) capsule Take 1 capsule (50,000 Units total) by mouth every 7 days. 12 capsule 3    acetaminophen-codeine 300-30mg (TYLENOL #3) 300-30 mg Tab Take 1-2 tablets by mouth every 6 (six) hours as needed.      albuterol (PROVENTIL/VENTOLIN HFA) 90 mcg/actuation inhaler Inhale 2 puffs into the lungs.      ranitidine (ZANTAC) 300 MG tablet Take 1 tablet by mouth.       No current facility-administered medications on file prior to visit.           Past Surgical History:   Procedure Laterality Date    PROSTATE SURGERY  2007       Family History   Problem Relation Age of Onset    Cancer Mother 60        colon    Hypertension Mother     Cancer Father 60        prostate    Diabetes Father     Hypertension Father     Hypertension Brother     Cancer Brother 52        pancreatic       Social History     Socioeconomic History    Marital status:    Occupational History     Employer: [employer]   Tobacco Use    Smoking status: Every Day     Packs/day: 0.25     Years: 30.00     Pack years: 7.50     Types: Cigarettes   Substance and Sexual Activity    Alcohol use: Yes     Comment: 6 pk beer per week    Drug use: No             Allergies   Review of patient's allergies indicates:   Allergen Reactions    Hymenoptera allergenic extract Anaphylaxis     information:  http://www.skincancer.org/prevention/sun-protection/sunscreen/sunscreens-safe-and-effective    CRYOTHERAPY FOR ACTINIC KERATOSES POST-TREATMENT CARE INSTRUCTIONS  Actinic keratoses are benign, scaly or gritty lesions that appear in sun-exposed areas and may progress to skin cancers. For this reason, it is important to treat them before they become cancerous.  Liquid nitrogen is mildly uncomfortable when applied to the skin, but the discomfort rapidly subsides.    Post-Treatment:  You may experience burning and/or stinging immediately following the procedure. The discomfort from the procedure may persist over the next 12-24 hours. The area treated will look pinker and slightly swollen before the healing process begins. You may also notice redness, swelling, tenderness, weeping and crusts or scabs. Healing time is approximately 10-14 days.    Blister - You may or may notice blistering from the freezing. If you develop an uncomfortable blister from today's treatment, you may gently puncture this with a needle that has been cleaned with alcohol. However, do not remove the protective skin layer of the blister.    Scab - After a few days, you may notice scaliness or scab formation. Do not pick at the scabs because this may cause slower healing and a permanent scar.    The skin may appear temporarily darker at the treatment site, but this usually fades over a period of months, provided that the area is protected from the sun.    Care of the areas treated:    Wash the area with a mild cleanser.    Gently pat dry.    Do not rub.     Keep protected from the sun during the healing process and for a full year following treatment as the skin continues to remodel during this time.    Apply Vaseline or Aquaphor ointment sparingly to the site for the first 7 days after treatment.    Do not use Neosporin, as many people eventually develop a medication allergy, that can easily be confused with an infection, to  "Insects extract     Lisinopril Other (See Comments)     cough    Shellfish containing products Hives             Review of Systems  See HPI      [unfilled]  /68   Pulse 80   Temp 98 °F (36.7 °C) (Oral)   Ht 5' 5" (1.651 m)   Wt 91.1 kg (200 lb 13.4 oz)   SpO2 99%   BMI 33.42 kg/m²       GEN: NAD, pleasant  HEENT: NCAT, PERRLA, EOMI, sclera clear, anicteric, O/P clear, MMM with no lesions  NECK: normal, supple with midline trachea, no LAD, no thyromegaly  LUNGS: CTAB, no w/r/r, normal respiratory effort  HEART: RRR, normal S1 and S2, no m/r/g, no palpitations, no edema  ABD: s/nt/nd, NABS, no organomegaly  SKIN: warm and dry with normal turgor, no rashes, no other lesions.   PSYCH: AOx3, appropriate mood and affect.   MSK: extremities warm/well perfused, normal ROM in all 4 extremities, no c/c/e.   NEURO: normal without focal findings, CN II-XII are intact.  Sensation grossly normal, gait and station normal.     FOOT:  Protective Sensation (w/ 10 gram monofilament):  Right: Intact  Left: Intact    Visual Inspection:  Normal -  Bilateral    Pedal Pulses:   Right: Present  Left: Present    Posterior Tibialis Pulses:   Right:Present  Left: Present              " Neomycin.    Return if:  There should not be any residual scaling. If there is any concern that the lesion has persisted after 4-6 weeks, make an appointment for a re-check. Healing time does vary depending on your individual healing process and the area of the body treated. Most patients will be healed in one month.    Signs of Infection:  Thankfully this is rare. However if you notice persistent colored drainage, increasing pain, fever or other signs of infection, please call us at: [phone]    The patient was counseled about sunscreens and sun avoidance. The patient was counseled to check the skin regularly and report any lesion that is new, changing, itching, scabbing, bleeding or otherwise bothersome. The patient was discharged ambulatory and in stable condition.      PROCEDURES:                                                    Name : Shave Excision  Indication : Excision of tissue for pathology evaluation.  Location(s) : Chest, 2 cm left of midline, at T2 : 3 mm in size, erythematous, indurated lesion with small hyperkeratotic center. ? Basal cell carcinoma ? Squamous cell carcinoma ? Hypertrophic actinic keratosis ? Other.  Completed by : La Thomas MD  Photo Taken : no.  Anesthesia : Patient was anesthetized by infiltrating the area surrounding the lesion with 1% lidocaine.   epinephrine [id number] : Yes.  Buffered with bicarbonate : Yes.  Note : Discussed the risk of pain, infection, scarring, hypo- or hyperpigmentation and recurrence or need for re-treatment. The benefits of treatment and alternative treatments were also discussed.    During this procedure, the universal protocol was utilized. The patient's identity was confirmed by no less than two patient identifiers, correct procedure was verified, correct site was verified and marked as applicable and a final pause was completed.    Sterile technique was used throughout the procedure. The skin was cleaned and prepped with surgical cleanser. Once  adequate anesthesia was obtained, the lesion was removed with a deep scallop shave procedure. The specimen was sent to pathology.    Direct pressure and monsels's and monopolar cautery was applied for hemostasis. No bleeding was present upon the completion of the procedure. The wound was coated with antibacterial ointment. A dry sterile dressing was applied. Patient tolerated the procedure well and left in satisfactory condition.    Primary provider and referring provider will be informed regarding the tissue report when it returns.      The information in this document, created by the medical scribe for me, accurately reflects the services I personally performed and the decisions made by me. I have reviewed and approved this document for accuracy prior to leaving the patient care area.  La Thomas MD November 7, 2017 3:29 PM  [facility] - PRIMARY CARE SKIN    Again, thank you for allowing me to participate in the care of your patient.        Sincerely,        Modesta Thomas MD

## 2023-08-08 ENCOUNTER — OFFICE VISIT (OUTPATIENT)
Dept: FAMILY MEDICINE | Facility: CLINIC | Age: 73
End: 2023-08-08
Payer: MEDICARE

## 2023-08-08 VITALS
SYSTOLIC BLOOD PRESSURE: 136 MMHG | RESPIRATION RATE: 16 BRPM | DIASTOLIC BLOOD PRESSURE: 80 MMHG | OXYGEN SATURATION: 99 % | HEART RATE: 75 BPM | TEMPERATURE: 97.9 F

## 2023-08-08 DIAGNOSIS — I42.8 OTHER CARDIOMYOPATHY (H): ICD-10-CM

## 2023-08-08 DIAGNOSIS — F41.9 ANXIETY: ICD-10-CM

## 2023-08-08 DIAGNOSIS — F43.23 ADJUSTMENT DISORDER WITH MIXED ANXIETY AND DEPRESSED MOOD: ICD-10-CM

## 2023-08-08 DIAGNOSIS — H61.23 EXCESSIVE CERUMEN IN BOTH EAR CANALS: Primary | ICD-10-CM

## 2023-08-08 DIAGNOSIS — I10 BENIGN ESSENTIAL HYPERTENSION: ICD-10-CM

## 2023-08-08 PROCEDURE — 99214 OFFICE O/P EST MOD 30 MIN: CPT | Mod: 25 | Performed by: FAMILY MEDICINE

## 2023-08-08 PROCEDURE — 96127 BRIEF EMOTIONAL/BEHAV ASSMT: CPT | Performed by: FAMILY MEDICINE

## 2023-08-08 PROCEDURE — 69209 REMOVE IMPACTED EAR WAX UNI: CPT | Mod: 50 | Performed by: FAMILY MEDICINE

## 2023-08-08 RX ORDER — SERTRALINE HYDROCHLORIDE 25 MG/1
25 TABLET, FILM COATED ORAL DAILY
Qty: 30 TABLET | Refills: 1 | Status: SHIPPED | OUTPATIENT
Start: 2023-08-08 | End: 2023-09-28

## 2023-08-08 ASSESSMENT — PATIENT HEALTH QUESTIONNAIRE - PHQ9
SUM OF ALL RESPONSES TO PHQ QUESTIONS 1-9: 13
SUM OF ALL RESPONSES TO PHQ QUESTIONS 1-9: 13
10. IF YOU CHECKED OFF ANY PROBLEMS, HOW DIFFICULT HAVE THESE PROBLEMS MADE IT FOR YOU TO DO YOUR WORK, TAKE CARE OF THINGS AT HOME, OR GET ALONG WITH OTHER PEOPLE: SOMEWHAT DIFFICULT

## 2023-08-08 ASSESSMENT — ANXIETY QUESTIONNAIRES
GAD7 TOTAL SCORE: 19
4. TROUBLE RELAXING: NEARLY EVERY DAY
GAD7 TOTAL SCORE: 19
6. BECOMING EASILY ANNOYED OR IRRITABLE: MORE THAN HALF THE DAYS
5. BEING SO RESTLESS THAT IT IS HARD TO SIT STILL: MORE THAN HALF THE DAYS
7. FEELING AFRAID AS IF SOMETHING AWFUL MIGHT HAPPEN: NEARLY EVERY DAY
IF YOU CHECKED OFF ANY PROBLEMS ON THIS QUESTIONNAIRE, HOW DIFFICULT HAVE THESE PROBLEMS MADE IT FOR YOU TO DO YOUR WORK, TAKE CARE OF THINGS AT HOME, OR GET ALONG WITH OTHER PEOPLE: VERY DIFFICULT
1. FEELING NERVOUS, ANXIOUS, OR ON EDGE: NEARLY EVERY DAY
3. WORRYING TOO MUCH ABOUT DIFFERENT THINGS: NEARLY EVERY DAY
2. NOT BEING ABLE TO STOP OR CONTROL WORRYING: NEARLY EVERY DAY

## 2023-08-08 ASSESSMENT — PAIN SCALES - GENERAL: PAINLEVEL: NO PAIN (0)

## 2023-08-08 NOTE — PROGRESS NOTES
"  Assessment & Plan     (H61.23) Excessive cerumen in both ear canals  (primary encounter diagnosis)  Comment:   Plan: [place] REMOVAL IMPACTED CERUMEN IRRIGATION/LVG         UNILAT           Impacted wax  is noted.  Wax is removed bymanual debridement using  ear curet but it was hard wax, so it was  followed by syringing, with good results   . Instructions for home care to prevent wax buildup are given.      (F43.23) Adjustment disorder with mixed anxiety and depressed mood  Comment:   Plan: sertraline (ZOLOFT) 25 MG tablet            (F41.9) Anxiety  Comment:   Plan: sertraline (ZOLOFT) 25 MG tablet        Discussed cares, talked about signs and symptoms of anxiety/ depression and treatment options. Willing to go back on zoloft for now, as she is dealing with lot of family stress.    Pros/ cons of med's discussed . encouraged to see  to help although she is comfortable watching for now.  spent sometimes counseling patient. Follow up in 4-6 weeks,  sooner if problem.       (I10) Benign essential hypertension  Comment:   Plan: BP in adequate control. Discussed cares, low fat low salt  diet etc. Check labs, call pt with results. Refill given. encouraged home BP monitoring.   Follow up recheck in 6 months, sooner if problem.         (I42.8) Other cardiomyopathy (H)  Comment: doing ok   Plan: seeing cardiology      script sent.Cares and  treatment discussed.  follow up if problem   Patient expressed understanding and agreement with treatment plan. All patient's questions were answered, will let me know if has more later.  Medications: Rx's: Reviewed the potential side effects/complications of medications prescribed.     BMI:   Estimated body mass index is 24.75 kg/m  as calculated from the following:    Height as of 12/16/22: 1.549 m (5' 1\").    Weight as of 12/27/22: 59.4 kg (131 lb).       Depression Screening Follow Up        8/8/2023     3:35 PM   PHQ   PHQ-9 Total Score 13   Q9: Thoughts of better off " dead/self-harm past 2 weeks Not at all         8/8/2023     3:35 PM   Last PHQ-9   1.  Little interest or pleasure in doing things 3   2.  Feeling down, depressed, or hopeless 3   3.  Trouble falling or staying asleep, or sleeping too much 2   4.  Feeling tired or having little energy 1   5.  Poor appetite or overeating 1   6.  Feeling bad about yourself 0   7.  Trouble concentrating 3   8.  Moving slowly or restless 0   Q9: Thoughts of better off dead/self-harm past 2 weeks 0   PHQ-9 Total Score 13       Follow Up Actions Taken  Patient has experienced a recent significant life event.  Patient counseled, no additional follow up at this time.  Depression Action Plan reviewed with patient.  Started patient on anti-depressant.       Fabiola Mahmood MD  [facility] is a 73 year old, presenting for the following health issues:  Ear Problem      8/8/2023     3:51 PM   Additional Questions   Roomed by Daniel   Accompanied by N/A       Ear Problem    History of Present Illness       Reason for visit:  Ears are plugged    She eats 2-3 servings of fruits and vegetables daily.She consumes 0 sweetened beverage(s) daily.She exercises with enough effort to increase her heart rate 30 to 60 minutes per day.  She exercises with enough effort to increase her heart rate 7 days per week.   She is taking medications regularly.       Hypertension / HX OF CARDIOMYOPATHY Follow-up    Do you check your blood pressure regularly outside of the clinic? Yes   Are you following a low salt diet? Yes  Are your blood pressures ever more than 140 on the top number (systolic) OR more   than 90 on the bottom number (diastolic), for example 140/90? No    Depression and Anxiety Follow-Up  How are you doing with your depression since your last visit? Worsened recently as her daughter also has bene diagnosed with CAD and had cardiac arrest but she revived and had stents , another son in [place] diagnosed  with ca esophagus, so has bee more anxious and down s wasn't to go back on her zoloft.it has worked well for her. She was off med's for a while sincesh e was doing well . So just wants to get checked   How are you doing with your anxiety since your last visit?  Worsened as above   Are you having other symptoms that might be associated with depression or anxiety? Yes:  as above   See phq-9 and debi 7    Have you had a significant life event? Relationship Concerns, Grief or Loss, and OTHER: as per HPI     Do you have any concerns with your use of alcohol or other drugs? No    Social History     Tobacco Use    Smoking status: Former     Types: Cigarettes     Quit date: 1990     Years since quittin.6    Smokeless tobacco: Never    Tobacco comments:     smoked 1-2 pack week, 30 years   Vaping Use    Vaping Use: Never used   Substance Use Topics    Alcohol use: No     Alcohol/week: 0.0 standard drinks of alcohol    Drug use: No         2022    12:40 PM 2022     8:51 AM 2023     3:35 PM   PHQ   PHQ-9 Total Score 5 0 13   Q9: Thoughts of better off dead/self-harm past 2 weeks Not at all Not at all Not at all         2021     8:09 AM 2022     8:52 AM 2023     3:36 PM   DEBI-7 SCORE   Total Score 6 (mild anxiety) 3 (minimal anxiety) 19 (severe anxiety)   Total Score 6 3 19         2023     3:35 PM   Last PHQ-9   1.  Little interest or pleasure in doing things 3   2.  Feeling down, depressed, or hopeless 3   3.  Trouble falling or staying asleep, or sleeping too much 2   4.  Feeling tired or having little energy 1   5.  Poor appetite or overeating 1   6.  Feeling bad about yourself 0   7.  Trouble concentrating 3   8.  Moving slowly or restless 0   Q9: Thoughts of better off dead/self-harm past 2 weeks 0   PHQ-9 Total Score 13         2023     3:36 PM   DEBI-7    1. Feeling nervous, anxious, or on edge 3   2. Not being able to stop or control worrying 3   3. Worrying too much about  different things 3   4. Trouble relaxing 3   5. Being so restless that it is hard to sit still 2   6. Becoming easily annoyed or irritable 2   7. Feeling afraid, as if something awful might happen 3   KALI-7 Total Score 19   If you checked any problems, how difficult have they made it for you to do your work, take care of things at home, or get along with other people? Very difficult       Suicide Assessment Five-step Evaluation and Treatment (SAFE-T)          Review of Systems   HENT:  Positive for ear pain.       Constitutional, HEENT, cardiovascular, pulmonary, GI, , musculoskeletal, neuro, skin, endocrine and psych systems are negative, except as otherwise noted.      Objective    There were no vitals taken for this visit.  There is no height or weight on file to calculate BMI.  Physical Exam   GENERAL: healthy, alert and no distress  EYES: Eyes grossly normal to inspection, PERRL and conjunctivae and sclerae normal  HENT: both ears: occluded with wax, nose and mouth without ulcers or lesions, oropharynx clear, and oral mucous membranes moist  NECK: no adenopathy  RESP: lungs clear to auscultation - no rales, rhonchi or wheezes  CV: regular rate and rhythm, normal S1 S2, no S3 or S4, no murmur, click or rub, no peripheral edema and peripheral pulses strong  ABDOMEN: soft, nontender, no hepatosplenomegaly, no masses and bowel sounds normal  MS: no edema  NEURO: Normal strength and tone, mentation intact and speech normal  PSYCH: mentation appears normal, affect normal/bright, anxious, speech pressured, judgement and insight impaired, and appearance well groomed          Patient identified using two patient identifiers.  Ear exam showing wax occlusion completed by MA. was placed in the bilateral ear(s) via  syringe with ear tip .   Wax was successfully removed from both ears.  Modesta Huntley MA on 8/8/2023 .

## 2023-09-22 ASSESSMENT — ENCOUNTER SYMPTOMS
BREAST MASS: 0
SORE THROAT: 0
DIZZINESS: 0
PALPITATIONS: 0
FREQUENCY: 0
DYSURIA: 0
HEADACHES: 0
HEMATOCHEZIA: 0
NAUSEA: 0
ABDOMINAL PAIN: 0
NERVOUS/ANXIOUS: 1
CHILLS: 0
MYALGIAS: 0
ARTHRALGIAS: 0
PARESTHESIAS: 1
WEAKNESS: 0
EYE PAIN: 0
CONSTIPATION: 0
FEVER: 0
COUGH: 0
HEMATURIA: 0
DIARRHEA: 0
JOINT SWELLING: 0
SHORTNESS OF BREATH: 0
HEARTBURN: 0

## 2023-09-22 ASSESSMENT — ACTIVITIES OF DAILY LIVING (ADL): CURRENT_FUNCTION: NO ASSISTANCE NEEDED

## 2023-09-28 ENCOUNTER — OFFICE VISIT (OUTPATIENT)
Dept: FAMILY MEDICINE | Facility: CLINIC | Age: 73
End: 2023-09-28
Payer: MEDICARE

## 2023-09-28 VITALS
HEIGHT: 60 IN | RESPIRATION RATE: 12 BRPM | WEIGHT: 137 LBS | DIASTOLIC BLOOD PRESSURE: 76 MMHG | HEART RATE: 68 BPM | TEMPERATURE: 97 F | OXYGEN SATURATION: 98 % | BODY MASS INDEX: 26.9 KG/M2 | SYSTOLIC BLOOD PRESSURE: 124 MMHG

## 2023-09-28 DIAGNOSIS — F43.23 ADJUSTMENT DISORDER WITH MIXED ANXIETY AND DEPRESSED MOOD: ICD-10-CM

## 2023-09-28 DIAGNOSIS — Z23 NEEDS FLU SHOT: ICD-10-CM

## 2023-09-28 DIAGNOSIS — Z00.00 ROUTINE HISTORY AND PHYSICAL EXAMINATION OF ADULT: Primary | ICD-10-CM

## 2023-09-28 DIAGNOSIS — I10 BENIGN ESSENTIAL HYPERTENSION: ICD-10-CM

## 2023-09-28 DIAGNOSIS — E78.5 HYPERLIPIDEMIA LDL GOAL <70: ICD-10-CM

## 2023-09-28 LAB
ALBUMIN SERPL BCG-MCNC: 4.2 G/DL (ref 3.5–5.2)
ALP SERPL-CCNC: 72 U/L (ref 35–104)
ALT SERPL W P-5'-P-CCNC: 21 U/L (ref 0–50)
ANION GAP SERPL CALCULATED.3IONS-SCNC: 11 MMOL/L (ref 7–15)
AST SERPL W P-5'-P-CCNC: 35 U/L (ref 0–45)
BILIRUB SERPL-MCNC: 0.4 MG/DL
BUN SERPL-MCNC: 21.4 MG/DL (ref 8–23)
CALCIUM SERPL-MCNC: 9.7 MG/DL (ref 8.8–10.2)
CHLORIDE SERPL-SCNC: 103 MMOL/L (ref 98–107)
CHOLEST SERPL-MCNC: 154 MG/DL
CREAT SERPL-MCNC: 0.87 MG/DL (ref 0.51–0.95)
EGFRCR SERPLBLD CKD-EPI 2021: 69 ML/MIN/1.73M2
GLUCOSE SERPL-MCNC: 96 MG/DL (ref 70–99)
HCO3 SERPL-SCNC: 26 MMOL/L (ref 22–29)
HDLC SERPL-MCNC: 63 MG/DL
LDLC SERPL CALC-MCNC: 82 MG/DL
NONHDLC SERPL-MCNC: 91 MG/DL
POTASSIUM SERPL-SCNC: 5 MMOL/L (ref 3.4–5.3)
PROT SERPL-MCNC: 7 G/DL (ref 6.4–8.3)
SODIUM SERPL-SCNC: 140 MMOL/L (ref 135–145)
TRIGL SERPL-MCNC: 45 MG/DL

## 2023-09-28 PROCEDURE — 80061 LIPID PANEL: CPT | Performed by: FAMILY MEDICINE

## 2023-09-28 PROCEDURE — 36415 COLL VENOUS BLD VENIPUNCTURE: CPT | Performed by: FAMILY MEDICINE

## 2023-09-28 PROCEDURE — 99214 OFFICE O/P EST MOD 30 MIN: CPT | Mod: 25 | Performed by: FAMILY MEDICINE

## 2023-09-28 PROCEDURE — 90662 IIV NO PRSV INCREASED AG IM: CPT | Performed by: FAMILY MEDICINE

## 2023-09-28 PROCEDURE — G0008 ADMIN INFLUENZA VIRUS VAC: HCPCS | Performed by: FAMILY MEDICINE

## 2023-09-28 PROCEDURE — 80053 COMPREHEN METABOLIC PANEL: CPT | Performed by: FAMILY MEDICINE

## 2023-09-28 PROCEDURE — G0439 PPPS, SUBSEQ VISIT: HCPCS | Performed by: FAMILY MEDICINE

## 2023-09-28 RX ORDER — LISINOPRIL 5 MG/1
5 TABLET ORAL DAILY
Qty: 90 TABLET | Refills: 3 | Status: SHIPPED | OUTPATIENT
Start: 2023-09-28 | End: 2024-09-24

## 2023-09-28 RX ORDER — SERTRALINE HYDROCHLORIDE 25 MG/1
25 TABLET, FILM COATED ORAL DAILY
Qty: 90 TABLET | Refills: 1 | Status: SHIPPED | OUTPATIENT
Start: 2023-09-28 | End: 2024-03-12

## 2023-09-28 ASSESSMENT — ENCOUNTER SYMPTOMS
CONSTIPATION: 0
FREQUENCY: 0
PALPITATIONS: 0
SHORTNESS OF BREATH: 0
JOINT SWELLING: 0
DYSURIA: 0
HEMATURIA: 0
CHILLS: 0
HEARTBURN: 0
DIARRHEA: 0
ABDOMINAL PAIN: 0
EYE PAIN: 0
FEVER: 0
COUGH: 0
PARESTHESIAS: 1
WEAKNESS: 0
ARTHRALGIAS: 0
BREAST MASS: 0
HEMATOCHEZIA: 0
NAUSEA: 0
DIZZINESS: 0
SORE THROAT: 0
HEADACHES: 0
NERVOUS/ANXIOUS: 1
MYALGIAS: 0

## 2023-09-28 ASSESSMENT — ANXIETY QUESTIONNAIRES
1. FEELING NERVOUS, ANXIOUS, OR ON EDGE: SEVERAL DAYS
GAD7 TOTAL SCORE: 3
GAD7 TOTAL SCORE: 3
2. NOT BEING ABLE TO STOP OR CONTROL WORRYING: NOT AT ALL
6. BECOMING EASILY ANNOYED OR IRRITABLE: NOT AT ALL
7. FEELING AFRAID AS IF SOMETHING AWFUL MIGHT HAPPEN: SEVERAL DAYS
3. WORRYING TOO MUCH ABOUT DIFFERENT THINGS: SEVERAL DAYS
5. BEING SO RESTLESS THAT IT IS HARD TO SIT STILL: NOT AT ALL

## 2023-09-28 ASSESSMENT — PATIENT HEALTH QUESTIONNAIRE - PHQ9
10. IF YOU CHECKED OFF ANY PROBLEMS, HOW DIFFICULT HAVE THESE PROBLEMS MADE IT FOR YOU TO DO YOUR WORK, TAKE CARE OF THINGS AT HOME, OR GET ALONG WITH OTHER PEOPLE: NOT DIFFICULT AT ALL
5. POOR APPETITE OR OVEREATING: NOT AT ALL
SUM OF ALL RESPONSES TO PHQ QUESTIONS 1-9: 3
SUM OF ALL RESPONSES TO PHQ QUESTIONS 1-9: 3

## 2023-09-28 ASSESSMENT — ACTIVITIES OF DAILY LIVING (ADL): CURRENT_FUNCTION: NO ASSISTANCE NEEDED

## 2023-09-28 ASSESSMENT — PAIN SCALES - GENERAL: PAINLEVEL: NO PAIN (0)

## 2023-09-28 NOTE — PROGRESS NOTES
"SUBJECTIVE:   Shelley is a 73 year old who presents for Preventive Visit.      9/28/2023     8:39 AM   Additional Questions   Roomed by Suzie VARELA       Are you in the first 12 months of your Medicare coverage?  No    Healthy Habits:     In general, how would you rate your overall health?  Good    Frequency of exercise:  6-7 days/week    Duration of exercise:  Greater than 60 minutes    Do you usually eat at least 4 servings of fruit and vegetables a day, include whole grains    & fiber and avoid regularly eating high fat or \"junk\" foods?  Yes    Taking medications regularly:  Yes    Medication side effects:  None    Ability to successfully perform activities of daily living:  No assistance needed    Home Safety:  No safety concerns identified    Hearing Impairment:  Difficulty following a conversation in a noisy restaurant or crowded room and need to ask people to speak up or repeat themselves    In the past 6 months, have you been bothered by leaking of urine?  No    In general, how would you rate your overall mental or emotional health?  Fair    Additional concerns today:  No      Today's PHQ-9 Score:       9/28/2023     8:28 AM   PHQ-9 SCORE   PHQ-9 Total Score MyChart 3 (Minimal depression)   PHQ-9 Total Score 3           Have you ever done Advance Care Planning? (For example, a Health Directive, POLST, or a discussion with a medical provider or your loved ones about your wishes): Yes, patient states has an Advance Care Planning document and will bring a copy to the clinic.        Hearing Assessment: not done--per pt no concerns    Fall risk  Fallen 2 or more times in the past year?: No  Any fall with injury in the past year?: No  click delete button to remove this line now  Cognitive Screening   1) Repeat 3 items (Leader, Season, Table)    2) Clock draw: NORMAL  3) 3 item recall: Recalls 3 objects  Results: 3 items recalled: COGNITIVE IMPAIRMENT LESS LIKELY    Mini-CogTM Copyright S Geetha. Licensed by the author " for use in [facility]; reprinted with permission (tonio@.[facility]). All rights reserved.      Do you have sleep apnea, excessive snoring or daytime drowsiness? : no    Reviewed and updated as needed this visit by clinical staff   Tobacco  Allergies  Meds              Reviewed and updated as needed this visit by Provider                 Social History     Tobacco Use    Smoking status: Former     Packs/day: 0.50     Years: 10.00     Pack years: 5.00     Types: Cigarettes     Quit date: 1990     Years since quittin.7    Smokeless tobacco: Never    Tobacco comments:     smoked 1-2 pack week, 30 years   Substance Use Topics    Alcohol use: No             2023     8:05 AM   Alcohol Use   Prescreen: >3 drinks/day or >7 drinks/week? Not Applicable          No data to display              Do you have a current opioid prescription? No  Do you use any other controlled substances or medications that are not prescribed by a provider? None          PROBLEMS TO ADD ON...     Hyperlipidemia Follow-Up    Are you regularly taking any medication or supplement to lower your cholesterol?   Yes- see epic    she is  fasting so wants labs and need refill on med's   Are you having muscle aches or other side effects that you think could be caused by your cholesterol lowering medication?  No     Vascular Disease Follow-up    How often do you take nitroglycerin? Aidae  Do you take an aspirin every day? Yes had seen cardiologist josette and she has bene doing quiet well     Depression and Anxiety Follow-Up  How are you doing with your depression since your last visit? No change.   she admits to some stress bc of helath issue with son in law - but she thinks she is and  feeling quiet  well and she is comfortable at her  current dose of zoloft     How are you doing with your anxiety since your last visit?  No change  Are you having other symptoms that might be associated with depression or anxiety? No  Have you had a  significant life event? No   Do you have any concerns with your use of alcohol or other drugs? No    Social History     Tobacco Use    Smoking status: Former     Packs/day: 0.50     Years: 10.00     Pack years: 5.00     Types: Cigarettes     Quit date: 1990     Years since quittin.7    Smokeless tobacco: Never    Tobacco comments:     smoked 1-2 pack week, 30 years   Vaping Use    Vaping Use: Never used   Substance Use Topics    Alcohol use: No    Drug use: Never         2022     8:51 AM 2023     3:35 PM 2023     8:28 AM   PHQ   PHQ-9 Total Score 0 13 3   Q9: Thoughts of better off dead/self-harm past 2 weeks Not at all Not at all Not at all         2021     8:09 AM 2022     8:52 AM 2023     3:36 PM   KALI-7 SCORE   Total Score 6 (mild anxiety) 3 (minimal anxiety) 19 (severe anxiety)   Total Score 6 3 19         2023     8:28 AM   Last PHQ-9   1.  Little interest or pleasure in doing things 0   2.  Feeling down, depressed, or hopeless 2   3.  Trouble falling or staying asleep, or sleeping too much 1   4.  Feeling tired or having little energy 0   5.  Poor appetite or overeating 0   6.  Feeling bad about yourself 0   7.  Trouble concentrating 0   8.  Moving slowly or restless 0   Q9: Thoughts of better off dead/self-harm past 2 weeks 0   PHQ-9 Total Score 3         2023     8:42 AM   KALI-7    1. Feeling nervous, anxious, or on edge 1   2. Not being able to stop or control worrying 0   3. Worrying too much about different things 1   4. Trouble relaxing 0   5. Being so restless that it is hard to sit still 0   6. Becoming easily annoyed or irritable 0   7. Feeling afraid, as if something awful might happen 1   KALI-7 Total Score 3       Suicide Assessment Five-step Evaluation and Treatment (SAFE-T)      Current providers sharing in care for this patient include:   Patient Care Team:  Fabiola Mahmood MD as PCP - General (Family Practice)  Bill Pham MD  as MD (Dermatology)  Megan Sidhu PA-C as Physician Assistant (Dermatology)  Carol Hummel PA-C as Assigned Surgical Provider  Salty Lovett MD as Assigned Heart and Vascular Provider  Fabiola Mahmood MD as Assigned PCP    The following health maintenance items are reviewed in Epic and correct as of today:  Health Maintenance   Topic Date Due    ZOSTER IMMUNIZATION (1 of 2) Never done    COVID-19 Vaccine (2 - Pfizer series) 01/28/2022    MEDICARE ANNUAL WELLNESS VISIT  12/16/2022    INFLUENZA VACCINE (1) 09/01/2023    CMP  11/29/2023    LIPID  11/29/2023    ANNUAL REVIEW OF HM ORDERS  12/16/2023    KALI ASSESSMENT  02/08/2024    PHQ-9  03/28/2024    MAMMO SCREENING  04/27/2024    FALL RISK ASSESSMENT  09/28/2024    COLORECTAL CANCER SCREENING  01/02/2026    ADVANCE CARE PLANNING  12/16/2026    DTAP/TDAP/TD IMMUNIZATION (5 - Td or Tdap) 12/11/2028    DEXA  11/16/2031    HEPATITIS C SCREENING  Completed    PHQ-2 (once per calendar year)  Completed    Pneumococcal Vaccine: 65+ Years  Completed    IPV IMMUNIZATION  Aged Out    HPV IMMUNIZATION  Aged Out    MENINGITIS IMMUNIZATION  Aged Out     Patient Active Problem List   Diagnosis    CAD (coronary artery disease)    Hyperlipidemia LDL goal <70    Disturbance of skin sensation    Adjustment disorder with mixed anxiety and depressed mood    Myocardial infarction (H)    Ischemic cardiomyopathy    Palpitations    Acute myocardial infarction of other inferior wall, initial episode of care    Anxiety    Coronary artery disease involving native coronary artery of native heart without angina pectoris    Tinnitus, bilateral    Gastroesophageal reflux disease, esophagitis presence not specified    Cardiomyopathy, unspecified (H)    Other screening mammogram    Routine history and physical examination of adult    Benign essential hypertension    Other nonallopathic lesion of cervical region    Spasm of muscle    Sprain of neck     Past Surgical  History:   Procedure Laterality Date    ANGIOPLASTY  2015    MAURISIO to mid circ, thrombectomy -MAURISIO to proximal, mid, distal RCA , successful balloon to 1st RPL done Resolute stents used procedure done at [facility]    COLONOSCOPY      ENT SURGERY      INNER EAR SURGERY      redesign roxann nieto bc of miners diseas     ORTHOPEDIC SURGERY      Broken ankle       Social History     Tobacco Use    Smoking status: Former     Packs/day: 0.50     Years: 10.00     Additional pack years: 0.00     Total pack years: 5.00     Types: Cigarettes     Quit date: 1990     Years since quittin.9    Smokeless tobacco: Never    Tobacco comments:     smoked 1-2 pack week, 30 years   Substance Use Topics    Alcohol use: No     Family History   Problem Relation Age of Onset    Coronary Artery Disease Father         at age 39, and other heart problem     Hyperlipidemia Father         ?     Hypertension Mother     Diabetes No family hx of     Cerebrovascular Disease No family hx of     Breast Cancer No family hx of     Colon Cancer No family hx of              Mammogram Screening - Patient over age 75, has elected to continue with screening.  Pertinent mammograms are reviewed under the imaging tab.    Review of Systems   Constitutional:  Negative for chills and fever.   HENT:  Negative for congestion, ear pain, hearing loss and sore throat.    Eyes:  Negative for pain and visual disturbance.   Respiratory:  Negative for cough and shortness of breath.    Cardiovascular:  Negative for chest pain, palpitations and peripheral edema.   Gastrointestinal:  Negative for abdominal pain, constipation, diarrhea, heartburn, hematochezia and nausea.   Breasts:  Negative for tenderness, breast mass and discharge.   Genitourinary:  Negative for dysuria, frequency, genital sores, hematuria, pelvic pain, urgency, vaginal bleeding and vaginal discharge.   Musculoskeletal:  Negative for arthralgias, joint swelling and myalgias.   Skin:   "Negative for rash.   Neurological:  Positive for paresthesias. Negative for dizziness, weakness and headaches.   Psychiatric/Behavioral:  Positive for mood changes. The patient is nervous/anxious.      CONSTITUTIONAL: NEGATIVE for fever, chills, change in weight  INTEGUMENTARY/SKIN: NEGATIVE for worrisome rashes, moles or lesions  EYES: NEGATIVE for vision changes or irritation  ENT/MOUTH: NEGATIVE for ear, mouth and throat problems.   She had burnt roof of her mouthwith hot soup   last month  and it felt sensitive and lasted 3 days ad has resolved and has healed but just wants to make sure it is all good now.  It was not painful she thinks she sleep with mouth open, so it can be dry  RESP: NEGATIVE for significant cough or SOB  BREAST: NEGATIVE for masses, tenderness or discharge  CV: NEGATIVE for chest pain, palpitations or peripheral edema  GI: NEGATIVE for nausea, abdominal pain, heartburn, or change in bowel habits  : NEGATIVE for frequency, dysuria, or hematuria  MUSCULOSKELETAL: NEGATIVE for significant arthralgias or myalgia  NEURO: NEGATIVE for weakness, dizziness or paresthesias  ENDOCRINE: NEGATIVE for temperature intolerance, skin/hair changes  HEME: NEGATIVE for bleeding problems  PSYCHIATRIC: NEGATIVE for changes in mood or affect    OBJECTIVE:   /76 (BP Location: Left arm, Patient Position: Sitting, Cuff Size: Adult Regular)   Pulse 68   Temp 97  F (36.1  C) (Tympanic)   Resp 12   Ht 1.529 m (5' 0.2\")   Wt 62.1 kg (137 lb)   SpO2 98%   BMI 26.58 kg/m   Estimated body mass index is 26.58 kg/m  as calculated from the following:    Height as of this encounter: 1.529 m (5' 0.2\").    Weight as of this encounter: 62.1 kg (137 lb).  Physical Exam  GENERAL: healthy, alert and no distress  EYES: Eyes grossly normal to inspection, PERRL and conjunctivae and sclerae normal  HENT: ear canals and TM's normal, nose and mouth without ulcers or lesions  NECK: no adenopathy, no asymmetry, masses, or " scars and thyroid normal to palpation  RESP: lungs clear to auscultation - no rales, rhonchi or wheezes  BREAST: normal without masses, tenderness or nipple discharge and no palpable axillary masses or adenopathy  CV: regular rate and rhythm, normal S1 S2, no S3 or S4, no murmur,   ABDOMEN: soft, nontender, no hepatosplenomegaly, no masses and bowel sounds normal  MS: no edema  SKIN: no suspicious lesions or rashes  NEURO: Normal strength and tone, mentation intact and speech normal  PSYCH: mentation appears normal, affect normal/bright      ASSESSMENT / PLAN:     (Z00.00) Routine history and physical examination of adult  (primary encounter diagnosis)  Comment:   Plan:     (Z23) Needs flu shot  Comment:   Plan: INFLUENZA VACCINE 65+ (FLUZONE HD)            (E78.5) Hyperlipidemia LDL goal <70  Comment:   Plan: Lipid panel reflex to direct LDL Fasting,         Comprehensive metabolic panel                      (I10) Benign essential hypertension  Comment:   Plan: Lipid panel reflex to direct LDL Fasting,         Comprehensive metabolic panel, lisinopril         (ZESTRIL) 5 MG tablet          BP in adequate control. Discussed cares, low fat low salt  diet etc.   Check labs, call pt with results. Refill given. encouraged home BP monitoring. Follow up recheck in 6 months, sooner if problem.       (F43.23) Adjustment disorder with mixed anxiety and depressed mood  Comment:   Plan: sertraline (ZOLOFT) 25 MG tablet            Discussed cares, talked about signs and symptoms of anxiety/ depression and treatment options. She is comfortable at current dose. Willing to stay on same dose    Pros/ cons of med's discussed . e. spent sometimes counseling patient. Follow up in 6 months, sooner if problem.       Check labs. refill sent.Cares and  treatment discussed follow. up if problem   Patient expressed understanding and agreement with treatment plan. All patient's questions were answered, will let me know if has more  later.  Medications: Rx's: Reviewed the potential side effects/complications of medications prescribed.         COUNSELING:  Reviewed preventive health counseling, as reflected in patient instructions       Regular exercise       Healthy diet/nutrition       Vision screening       Hearing screening       Dental care       Bladder control       Fall risk prevention       Alcohol Use        Osteoporosis prevention/bone health       Colon cancer screening       (Maddie)menopause management       Advanced Planning         She reports that she quit smoking about 33 years ago. Her smoking use included cigarettes. She has a 5.00 pack-year smoking history. She has never used smokeless tobacco.      Appropriate preventive services were discussed with this patient, including applicable screening as appropriate for cardiovascular disease, diabetes, osteopenia/osteoporosis, and glaucoma.  As appropriate for age/gender, discussed screening for colorectal cancer, prostate cancer, breast cancer, and cervical cancer. Checklist reviewing preventive services available has been given to the patient.    Reviewed patients plan of care and provided an AVS. The Basic Care Plan (routine screening as documented in Health Maintenance) for Shelley meets the Care Plan requirement. This Care Plan has been established and reviewed with the Patient.        Fabiola Mahmood MD  [facility]    Identified Health Risks:  Answers submitted by the patient for this visit:  Patient Health Questionnaire (Submitted on 9/28/2023)  If you checked off any problems, how difficult have these problems made it for you to do your work, take care of things at home, or get along with other people?: Not difficult at all  PHQ9 TOTAL SCORE: 3

## 2023-11-27 ENCOUNTER — NURSE TRIAGE (OUTPATIENT)
Dept: FAMILY MEDICINE | Facility: CLINIC | Age: 73
End: 2023-11-27
Payer: MEDICARE

## 2023-11-27 NOTE — TELEPHONE ENCOUNTER
Reason for Call:  Appointment Request    Patient requesting this type of appt: Chronic Diease Management/Medication/Follow-Up    Requested provider: Fabiola Mahmood    Reason patient unable to be scheduled: Not within requested timeframe    When does patient want to be seen/preferred time: ASAP    Comments: PATIENT WANTS TO SCHEDULE A Follow-up ON HER EARS.     Could we send this information to you in [facility] or would you prefer to receive a phone call?:   Patient would prefer a phone call   Okay to leave a detailed message?: Yes at Cell number on file:    Telephone Information:   Mobile [phone]       Call taken on 11/27/2023 at 8:52 AM by Anna Anne

## 2023-11-27 NOTE — TELEPHONE ENCOUNTER
"Nurse Triage SBAR    Is this a 2nd Level Triage? YES    Situation: Pt states she has a \"plugged feeling\" or pressure in both ears that started about a month ago. Pt denies any pain, any URI symptoms.    Background: Pt has had plugged ears in the past.     Assessment: Plugged ears    Protocol Recommended Disposition:   See in Office Today    Recommendation: Pt would like an appt to be seen for her ears. Same day ok? Next available provider?      Routed to provider    Does the patient meet one of the following criteria for ADS visit consideration? 16+ years old, with an [facility] PCP     TIP  Providers, please consider if this condition is appropriate for management at one of our Acute and Diagnostic Services sites.     If patient is a good candidate, please use dotphrase <dot>triageresponse and select Refer to ADS to document.     Reason for Disposition   Patient wants to be seen    Additional Information   Negative: Ear pain is main symptom   Negative: Hearing loss (complete or partial) is main symptom   Negative: Earwax is main concern   Negative: Has nasal allergies and they are acting up    Answer Assessment - Initial Assessment Questions  1. LOCATION: \"Which ear is involved?\"        Both ears     2. SENSATION: \"Describe how the ear feels.\" (e.g. stuffy, full, plugged).\"       Plugged, pressure     3. ONSET:  \"When did the ear symptoms start?\"        A month ago     4. PAIN: \"Do you also have an earache?\" If Yes, ask: \"How bad is it?\" (Scale 1-10; or mild, moderate, severe)      No    5. CAUSE: \"What do you think is causing the ear congestion?\"      Small ear canal     6. URI: \"Do you have a runny nose or cough?\"       No    7. NASAL ALLERGIES: \"Are there symptoms of hay fever, such as sneezing or a clear nasal discharge?\"      No    8. PREGNANCY: \"Is there any chance you are pregnant?\" \"When was your last menstrual period?\"      No    Protocols used: Ear - Congestion-A-[place]      Angela Smith RN    "

## 2023-12-11 ENCOUNTER — OFFICE VISIT (OUTPATIENT)
Dept: FAMILY MEDICINE | Facility: CLINIC | Age: 73
End: 2023-12-11
Payer: MEDICARE

## 2023-12-11 VITALS
DIASTOLIC BLOOD PRESSURE: 62 MMHG | BODY MASS INDEX: 26.5 KG/M2 | HEIGHT: 60 IN | HEART RATE: 75 BPM | SYSTOLIC BLOOD PRESSURE: 118 MMHG | RESPIRATION RATE: 16 BRPM | TEMPERATURE: 96 F | WEIGHT: 135 LBS | OXYGEN SATURATION: 99 %

## 2023-12-11 DIAGNOSIS — M26.609 TEMPOROMANDIBULAR JOINT DISORDER: Primary | ICD-10-CM

## 2023-12-11 DIAGNOSIS — H61.23 EXCESSIVE EAR WAX, BILATERAL: ICD-10-CM

## 2023-12-11 PROCEDURE — 69210 REMOVE IMPACTED EAR WAX UNI: CPT | Performed by: FAMILY MEDICINE

## 2023-12-11 PROCEDURE — 99213 OFFICE O/P EST LOW 20 MIN: CPT | Mod: 25 | Performed by: FAMILY MEDICINE

## 2023-12-11 RX ORDER — RESPIRATORY SYNCYTIAL VIRUS VACCINE 120MCG/0.5
0.5 KIT INTRAMUSCULAR ONCE
Qty: 1 EACH | Refills: 0 | Status: CANCELLED | OUTPATIENT
Start: 2023-12-11 | End: 2023-12-11

## 2023-12-11 ASSESSMENT — PAIN SCALES - GENERAL: PAINLEVEL: NO PAIN (0)

## 2023-12-11 NOTE — PROGRESS NOTES
Assessment & Plan     (M26.609) Temporomandibular joint disorder  (primary encounter diagnosis)  Comment: mostly clinking/ laxity - rt , not as much pain  Plan: Cares and symptomatic treatment discussed from TMJ and info give.  follow up if problem       (H61.23) Excessive ear wax, bilateral  Comment:   Plan: VA REMOVAL IMPACTED CERUMEN IRRIGATION/LVG         UNILAT          Cerumenosis is noted.  Wax is removed by syringing and manual debridement. Instructions for home care to prevent wax buildup are given.    Cares and symptomatic treatment discussed follow up if problem         MD SWATI Melendez [facility] is [facility] 73 year [facility], presenting for the following health issues:  Ear Problem        12/11/2023    11:13 AM   Additional Questions   Roomed by Madison LONGORIA       Ear Problem    History of Present Illness       Reason for visit:  Ear pressure    She eats 2-3 servings of fruits and vegetables daily.She consumes 0 sweetened beverage(s) daily.She exercises with enough effort to increase her heart rate 60 or more minutes per day.  She exercises with enough effort to increase her heart rate 7 days per week.   She is taking medications regularly.     Rt jaw is clicking and sometimes and feels like it locks. Has seen dentist 10 days ago and she was not to concerned about any dental issue ,   Although her both hear are feeling full and plugged,  so just wanted to get checked . has hx previous hx of ear wax and  needed cleaning etc , so wanted to get checked         Review of Systems   HENT:  Positive for ear pain.       Constitutional, HEENT, cardiovascular, pulmonary, GI, , musculoskeletal, neuro, skin, endocrine and psych systems are negative, except as otherwise noted.      Objective    There were no vitals taken for this visit.  There is no height or weight on file to calculate BMI.  Physical Exam   GENERAL: healthy, alert and no distress  EYES: Eyes grossly  normal to inspection, PERRL and conjunctivae and sclerae normal  HENT: ear canals and TM's normal, nose and mouth without ulcers or lesions  NECK: no adenopathy, no asymmetry, masses, or scars and thyroid normal to palpation  RESP: lungs clear to auscultation - no rales, rhonchi or wheezes  CV: regular rate and rhythm, normal S1 S2, no S3 or S4, no murmur  ABDOMEN: soft, nontender, no hepatosplenomegaly, no masses and bowel sounds normal  MS:  rt tmj has no tenderness to palpation but has click ad she feels locking sensation soemetimes as she opens the jaw   SKIN: no suspicious lesions or rashes  NEURO: Normal strength and tone, mentation intact and speech normal  PSYCH: mentation appears normal, affect normal/bright

## 2023-12-14 ENCOUNTER — OFFICE VISIT (OUTPATIENT)
Dept: FAMILY MEDICINE | Facility: CLINIC | Age: 73
End: 2023-12-14
Payer: MEDICARE

## 2023-12-14 DIAGNOSIS — L57.0 ACTINIC KERATOSES: ICD-10-CM

## 2023-12-14 DIAGNOSIS — D18.01 CHERRY ANGIOMA: ICD-10-CM

## 2023-12-14 DIAGNOSIS — L82.1 SEBORRHEIC KERATOSES: ICD-10-CM

## 2023-12-14 DIAGNOSIS — Z12.83 ENCOUNTER FOR SCREENING FOR MALIGNANT NEOPLASM OF SKIN: ICD-10-CM

## 2023-12-14 DIAGNOSIS — D22.9 MULTIPLE NEVI: Primary | ICD-10-CM

## 2023-12-14 DIAGNOSIS — L57.0 ACTINIC KERATOSIS: ICD-10-CM

## 2023-12-14 DIAGNOSIS — L81.4 LENTIGINES: ICD-10-CM

## 2023-12-14 PROCEDURE — 99214 OFFICE O/P EST MOD 30 MIN: CPT | Performed by: PHYSICIAN ASSISTANT

## 2023-12-14 ASSESSMENT — PAIN SCALES - GENERAL: PAINLEVEL: NO PAIN (0)

## 2023-12-14 NOTE — LETTER
12/14/2023         RE: Shelley Mccrary  [address] [address]        Dear Colleague,    Thank you for referring your patient, Shelley Mccrary, to the [facility]. Please see a copy of my visit note below.    [facility] Dermatology Note  Encounter Date: Dec 14, 2023  Office Visit     Reviewed patients past medical history and pertinent chart review prior to patients visit today.     Dermatology Problem List:  1. AK s/p cryo  2. Hx radiation on R face to remove birthmark     Social hx: [employer] in [place].   ____________________________________________    Assessment & Plan:     # Actinic keratoses  Due to the diffuse nature of the actinic keratoses involving the face, we discussed options for treatment including liquid nitrogen cryotherapy versus topical calcipotriene 0.005% and fluorouracil 5% cream.  The patient was interested in undergoing topical therapy.  We discussed side effects including redness, pain, and sun sensitivity.  I recommended diligent avoidance of application to the eyes.  The medication should be kept away from pets. The patient had no further questions.    The patient plans to undergo treatment in April, we will recheck in June.     # Multiple nevi  # Solar lentigines  - No concerning features on dermoscopy. We discussed the importance of self exams at home.   - ABCDEs: Counseled ABCDEs of melanoma: Asymmetry, Border (irregularity), Color (not uniform, changes in color), Diameter (greater than 6 mm which is about the size of a pencil eraser), and Evolving (any changes in preexisting moles).  - Sun protection: Counseled SPF 30+ sunscreen, UPF clothing, sun avoidance, tanning bed avoidance.    # Cherry angiomas  # Seborrheic keratoses  - We discussed the benign nature of the skin lesions. No treatment required. Continued observation recommended. Follow up with any concerns.      Follow-up:  Annual for follow up full body  skin exam, as needed for new or changing lesions or new concerns    Eliz Courtney PA-C  Dermatology     ____________________________________________    CC: Skin Check (FBSE, c/o spot on right cheek)    HPI:  Ms. Shelley Mccrary is a(n) 73 year old female who presents today as a return patient for a full body skin cancer screening. The patient denies a personal history of skin cancer. The patient requests evaluation of rough lesions on the right cheek and upper lip. No other specific cutaneous concerns today. The patient reports trying to be diligent with photoprotection.      Physical Exam:  Vitals: There were no vitals taken for this visit.  SKIN: Total skin excluding the genitalia areas was performed. The exam included the scalp, face, neck, bilateral arms, chest, back, abdomen, bilateral legs, digits, mons pubis, buttocks, and nails.   - Baird II.  - Pink macule(s) with gritty scale involving the forehead, right cheek, and upper lip, consistent with actinic keratosis.   - Multiple tan/brown macules and papules scattered throughout exam, consistent with benign nevi. No concerning features on dermoscopy.   - Scattered tan, homogenous macules scattered on sun exposed skin, consistent with solar lentigines.   - Scattered waxy, stuck on appearing papules and patches, consistent with seborrheic keratoses.    - Several 1-2 mm red dome shaped symmetric papules, consistent with cherry angiomas.     Medications:  Current Outpatient Medications   Medication     aspirin 81 MG tablet     atorvastatin (LIPITOR) 40 MG tablet     cetirizine (ZYRTEC) 10 MG tablet     diphenhydrAMINE HCl (BENADRYL PO)     lisinopril (ZESTRIL) 5 MG tablet     metoprolol succinate ER (TOPROL XL) 25 MG 24 hr tablet     Multiple Vitamin (MULTI-VITAMINS) TABS     nitroglycerin (NITROSTAT) 0.4 MG SL tablet     Omega-3 Fatty Acids (OMEGA-3 FISH OIL PO)     sertraline (ZOLOFT) 25 MG tablet     No current facility-administered medications for  this visit.      Past Medical History:   Patient Active Problem List   Diagnosis     CAD (coronary artery disease)     Hyperlipidemia LDL goal <70     Disturbance of skin sensation     Adjustment disorder with mixed anxiety and depressed mood     Myocardial infarction (H)     Ischemic cardiomyopathy     Palpitations     Acute myocardial infarction of other inferior wall, initial episode of care     Anxiety     Coronary artery disease involving native coronary artery of native heart without angina pectoris     Tinnitus, bilateral     Gastroesophageal reflux disease, esophagitis presence not specified     Cardiomyopathy, unspecified (H)     Other screening mammogram     Routine history and physical examination of adult     Benign essential hypertension     Other nonallopathic lesion of cervical region     Spasm of muscle     Sprain of neck     Temporomandibular joint disorder     Excessive ear wax, bilateral     Past Medical History:   Diagnosis Date     Anxiety     post MI     CAD (coronary artery disease) 07/29/2015     Depression     post MI     Depressive disorder      MRSA infection     hand      Myocardial infarction (H) 07/01/2015     Vertigo     related miners        CC Referred Self, MD  No address on file on close of this encounter.      Again, thank you for allowing me to participate in the care of your patient.        Sincerely,        Eliz Courtney PA-C

## 2023-12-14 NOTE — PROGRESS NOTES
[facility] Dermatology Note  Encounter Date: Dec 14, 2023  Office Visit     Reviewed patients past medical history and pertinent chart review prior to patients visit today.     Dermatology Problem List:  1. AK s/p cryo  2. Hx radiation on R face to remove birthmark     Social hx: [employer] in [place].   ____________________________________________    Assessment & Plan:     # Actinic keratoses  Due to the diffuse nature of the actinic keratoses involving the face, we discussed options for treatment including liquid nitrogen cryotherapy versus topical calcipotriene 0.005% and fluorouracil 5% cream.  The patient was interested in undergoing topical therapy.  We discussed side effects including redness, pain, and sun sensitivity.  I recommended diligent avoidance of application to the eyes.  The medication should be kept away from pets. The patient had no further questions.    The patient plans to undergo treatment in April, we will recheck in June.     # Multiple nevi  # Solar lentigines  - No concerning features on dermoscopy. We discussed the importance of self exams at home.   - ABCDEs: Counseled ABCDEs of melanoma: Asymmetry, Border (irregularity), Color (not uniform, changes in color), Diameter (greater than 6 mm which is about the size of a pencil eraser), and Evolving (any changes in preexisting moles).  - Sun protection: Counseled SPF 30+ sunscreen, UPF clothing, sun avoidance, tanning bed avoidance.    # Cherry angiomas  # Seborrheic keratoses  - We discussed the benign nature of the skin lesions. No treatment required. Continued observation recommended. Follow up with any concerns.      Follow-up:  Annual for follow up full body skin exam, as needed for new or changing lesions or new concerns    Eliz Courtney PA-C  Dermatology     ____________________________________________    CC: Skin Check (FBSE, c/o spot on right cheek)    HPI:  Ms. Shelley Mccrary is a(n) 73 year old female who  presents today as a return patient for a full body skin cancer screening. The patient denies a personal history of skin cancer. The patient requests evaluation of rough lesions on the right cheek and upper lip. No other specific cutaneous concerns today. The patient reports trying to be diligent with photoprotection.      Physical Exam:  Vitals: There were no vitals taken for this visit.  SKIN: Total skin excluding the genitalia areas was performed. The exam included the scalp, face, neck, bilateral arms, chest, back, abdomen, bilateral legs, digits, mons pubis, buttocks, and nails.   - Baird II.  - Pink macule(s) with gritty scale involving the forehead, right cheek, and upper lip, consistent with actinic keratosis.   - Multiple tan/brown macules and papules scattered throughout exam, consistent with benign nevi. No concerning features on dermoscopy.   - Scattered tan, homogenous macules scattered on sun exposed skin, consistent with solar lentigines.   - Scattered waxy, stuck on appearing papules and patches, consistent with seborrheic keratoses.    - Several 1-2 mm red dome shaped symmetric papules, consistent with cherry angiomas.     Medications:  Current Outpatient Medications   Medication    aspirin 81 MG tablet    atorvastatin (LIPITOR) 40 MG tablet    cetirizine (ZYRTEC) 10 MG tablet    diphenhydrAMINE HCl (BENADRYL PO)    lisinopril (ZESTRIL) 5 MG tablet    metoprolol succinate ER (TOPROL XL) 25 MG 24 hr tablet    Multiple Vitamin (MULTI-VITAMINS) TABS    nitroglycerin (NITROSTAT) 0.4 MG SL tablet    Omega-3 Fatty Acids (OMEGA-3 FISH OIL PO)    sertraline (ZOLOFT) 25 MG tablet     No current facility-administered medications for this visit.      Past Medical History:   Patient Active Problem List   Diagnosis    CAD (coronary artery disease)    Hyperlipidemia LDL goal <70    Disturbance of skin sensation    Adjustment disorder with mixed anxiety and depressed mood    Myocardial infarction (H)     Ischemic cardiomyopathy    Palpitations    Acute myocardial infarction of other inferior wall, initial episode of care    Anxiety    Coronary artery disease involving native coronary artery of native heart without angina pectoris    Tinnitus, bilateral    Gastroesophageal reflux disease, esophagitis presence not specified    Cardiomyopathy, unspecified (H)    Other screening mammogram    Routine history and physical examination of adult    Benign essential hypertension    Other nonallopathic lesion of cervical region    Spasm of muscle    Sprain of neck    Temporomandibular joint disorder    Excessive ear wax, bilateral     Past Medical History:   Diagnosis Date    Anxiety     post MI    CAD (coronary artery disease) 07/29/2015    Depression     post MI    Depressive disorder     MRSA infection     hand     Myocardial infarction (H) 07/01/2015    Vertigo     related miners        CC Referred Self, MD  No address on file on close of this encounter.

## 2023-12-14 NOTE — PATIENT INSTRUCTIONS
Efudex and calcipotriene combination Treatment  Today, you are being prescribed Fluorouracil (Efudex) a topical cream used for the treatment of Actinic Keratosis (AKs).  The medication is working to eliminate the unhealthy cells.  This treatment may be unattractive and somewhat uncomfortable.  Your treatment will last 5-10 days. You may experience some mild discomfort while being treated.  You will want to stop any other creams such as glycolic acid products, retin A, Tazorac, etc. to the area. You may use bland makeup/cover-up as long as it doesn't sting or cause you discomfort.  When using calcipotriene and Fluorouracil, apply the cream both morning and night for 5-10 days as your provider recommends. Use a Q-tip or use gloves if applying it with your fingertips. If applied with unprotected fingertips, it is important to wash your hands well after you apply this medicine. If you are using the Fluorouracil alone, apply only at night for 2-4 weeks.   Keep this medication away from pets.  We recommend avoiding excessive sun exposure to the treated area. Wear a sunscreen with SPF 50+ to the areas being treated prior to any sun exposure as you will be more sensitive to the sun and more prone to sunburn while being treated and afterwards during healing process.   You may use ointments such as vaseline or Aquaphor, or moisturizing creams such as Vanicream or Cetaphil cream over bothersome areas. If the reaction becomes itchy you may apply over the counter hydrocorisone 1% cream or ointment twice daily to itchy areas. If the reaction becomes too bothersome, please call the clinic as we may need to discontinue treatment or reevaluate in the clinic.     Potential Side Effects  Your treated areas may be unsightly during therapy.  This will improve slowly following the discontinuation of therapy.   During the beginning of treatment, mild inflammation may occur.   During the end of treatment, redness, and swelling may occur  with some crusting and burning.   Lesions resolve as the skin exfoliates.   Over 1 to 2 weeks, new skin grows into the treatment area.  Keep this medication away from pets as it can be very harmful to them.     Specific side effects that usually do not require medical attention (report to your doctor or health care professional if they continue or are bothersome) include:  Red or dark-colored skin   Mild erosion (loss of upper layer of skin)   Mild eye irritation including burning, itching, sensitivity, stinging, or watering   Increased sensitivity of the skin to sun and ultraviolet light   Pain and burning of the affected area   Dryness, scaling or swelling of the affected area   Skin rash, itching of the affected area   Tenderness   If you have severe pain, please, call the clinic immediately and indicate that you have pain.  Ask for a call from the RN.     Who should I call with questions?  Dermatology nurse line: [phone]     Patient Education       Proper skin care from [facility]:    -Eliminate harsh soaps as they strip the natural oils from the skin, often resulting in dry itchy skin ( i.e. Dial, Zest, [place])  -Use mild soaps such as Cetaphil or Dove Sensitive Skin in the shower. You do not need to use soap on arms, legs, and trunk every time you shower unless visibly soiled.   -Avoid hot or cold showers.  -After showering, lightly dry off and apply moisturizing within 2-3 minutes. This will help trap moisture in the skin.   -Aggressive use of a moisturizer at least 1-2 times a day to the entire body (including -Vanicream, Cetaphil, Aquaphor or Cerave) and moisturize hands after every washing.  -We recommend using moisturizers that come in a tub that needs to be scooped out, not a pump. This has more of an oil base. It will hold moisture in your skin much better than a water base moisturizer. The above recommended are non-pore clogging.      Wear a sunscreen with at least SPF 30 on your  face, ears, neck and V of the chest daily. Wear sunscreen on other areas of the body if those areas are exposed to the sun throughout the day. Sunscreens can contain physical and/or chemical blockers. Physical blockers are less likely to clog pores, these include zinc oxide and titanium dioxide. Reapply every two hour and after swimming.     Sunscreen examples: https://www.ewg.org/sunscreen/    UV radiation  UVA radiation remains constant throughout the day and throughout the year. It is a longer wavelength than UVB and therefore penetrates deeper into the skin leading to immediate and delayed tanning, photoaging, and skin cancer. 70-80% of UVA and UVB radiation occurs between the hours of 10am-2pm.  UVB radiation  UVB radiation causes the most harmful effects and is more significant during the summer months. However, snow and ice can reflect UVB radiation leading to skin damage during the winter months as well. UVB radiation is responsible for tanning, burning, inflammation, delayed erythema (pinkness), pigmentation (brown spots), and skin cancer.     I recommend self monthly full body exams and yearly full body exams with a dermatology provider. If you develop a new or changing lesion please follow up for examination. Most skin cancers are pink and scaly or pink and pearly. However, we do see blue/brown/black skin cancers.  Consider the ABCDEs of melanoma when giving yourself your monthly full body exam ( don't forget the groin, buttocks, feet, toes, etc). A-asymmetry, B-borders, C-color, D-diameter, E-elevation or evolving. If you see any of these changes please follow up in clinic. If you cannot see your back I recommend purchasing a hand held mirror to use with a larger wall mirror.       Checking for Skin Cancer  You can find cancer early by checking your skin each month. There are 3 kinds of skin cancer. They are melanoma, basal cell carcinoma, and squamous cell carcinoma. Doing monthly skin checks is the  best way to find new marks or skin changes. Follow the instructions below for checking your skin.   The ABCDEs of checking moles for melanoma   Check your moles or growths for signs of melanoma using ABCDE:   Asymmetry: the sides of the mole or growth don t match  Border: the edges are ragged, notched, or blurred  Color: the color within the mole or growth varies  Diameter: the mole or growth is larger than 6 mm (size of a pencil eraser)  Evolving: the size, shape, or color of the mole or growth is changing (evolving is not shown in the images below)    Checking for other types of skin cancer  Basal cell carcinoma or squamous cell carcinoma have symptoms such as:     A spot or mole that looks different from all other marks on your skin  Changes in how an area feels, such as itching, tenderness, or pain  Changes in the skin's surface, such as oozing, bleeding, or scaliness  A sore that does not heal  New swelling or redness beyond the border of a mole    Who s at risk?  Anyone can get skin cancer. But you are at greater risk if you have:   Fair skin, light-colored hair, or light-colored eyes  Many moles or abnormal moles on your skin  A history of sunburns from sunlight or tanning beds  A family history of skin cancer  A history of exposure to radiation or chemicals  A weakened immune system  If you have had skin cancer in the past, you are at risk for recurring skin cancer.   How to check your skin  Do your monthly skin checkups in front of a full-length mirror. Check all parts of your body, including your:   Head (ears, face, neck, and scalp)  Torso (front, back, and sides)  Arms (tops, undersides, upper, and lower armpits)  Hands (palms, backs, and fingers, including under the nails)  Buttocks and genitals  Legs (front, back, and sides)  Feet (tops, soles, toes, including under the nails, and between toes)  If you have a lot of moles, take digital photos of them each month. Make sure to take photos both up close  and from a distance. These can help you see if any moles change over time.   Most skin changes are not cancer. But if you see any changes in your skin, call your doctor right away. Only he or she can diagnose a problem. If you have skin cancer, seeing your doctor can be the first step toward getting the treatment that could save your life.   [employer] last reviewed this educational content on 4/1/2019 2000-2020 [employer]. [address] [place] [address], [place] [address]. All rights reserved. This information is not intended as a substitute for professional medical care. Always follow your healthcare professional's instructions.       When should I call my doctor?  If you are worsening or not improving, please, contact us or seek urgent care as noted below.     Who should I call with questions (adults)?  [facility] (adult and pediatric): [phone]  [facility] (adult): [phone]  [facility] ([facility] and [place]) [phone]  For urgent needs outside of business hours call the [facility] at [phone] and ask for the dermatology resident on call to be paged  If this is a medical emergency and you are unable to reach an ER, Call [phone]      If you need a prescription refill, please contact your pharmacy. Refills are approved or denied by our Physicians during normal business hours, Monday through Fridays  Per office policy, refills will not be granted if you have not been seen within the past year (or sooner depending on your child's condition)

## 2023-12-18 ENCOUNTER — TELEPHONE (OUTPATIENT)
Dept: FAMILY MEDICINE | Facility: CLINIC | Age: 73
End: 2023-12-18
Payer: MEDICARE

## 2023-12-18 NOTE — TELEPHONE ENCOUNTER
Received fax from [facility] to do a PA on the calcipotriene and fluorouracil creams.  Rx was sent to [facility] on 12/14/23.  Sent [facility] pharmacy a message that PA is not needed at this time.    Maryanne BURCIAGA RN  [facility]  [phone]

## 2023-12-19 DIAGNOSIS — E78.5 HYPERLIPIDEMIA LDL GOAL <70: ICD-10-CM

## 2023-12-19 DIAGNOSIS — I42.8 OTHER CARDIOMYOPATHY (H): ICD-10-CM

## 2023-12-19 RX ORDER — ATORVASTATIN CALCIUM 40 MG/1
40 TABLET, FILM COATED ORAL DAILY
Qty: 90 TABLET | Refills: 2 | Status: SHIPPED | OUTPATIENT
Start: 2023-12-19 | End: 2024-09-21

## 2023-12-19 NOTE — TELEPHONE ENCOUNTER
Metoprolol:   Sig - Route: Take 1 tablet (25 mg) by mouth daily ### DO NOT FILL NOW.  Please update patient's profile to reflect additional refills.  #### -     Please advise if updated signature recommended, thanks!

## 2023-12-22 RX ORDER — METOPROLOL SUCCINATE 25 MG/1
25 TABLET, EXTENDED RELEASE ORAL DAILY
Qty: 90 TABLET | Refills: 0 | Status: SHIPPED | OUTPATIENT
Start: 2023-12-22 | End: 2024-03-25

## 2024-03-12 DIAGNOSIS — F43.23 ADJUSTMENT DISORDER WITH MIXED ANXIETY AND DEPRESSED MOOD: ICD-10-CM

## 2024-03-12 RX ORDER — SERTRALINE HYDROCHLORIDE 25 MG/1
25 TABLET, FILM COATED ORAL DAILY
Qty: 90 TABLET | Refills: 0 | Status: SHIPPED | OUTPATIENT
Start: 2024-03-12 | End: 2024-06-10

## 2024-03-25 ENCOUNTER — MYC REFILL (OUTPATIENT)
Dept: INTERNAL MEDICINE | Facility: CLINIC | Age: 74
End: 2024-03-25
Payer: MEDICARE

## 2024-03-25 DIAGNOSIS — I42.8 OTHER CARDIOMYOPATHY (H): ICD-10-CM

## 2024-04-02 RX ORDER — METOPROLOL SUCCINATE 25 MG/1
25 TABLET, EXTENDED RELEASE ORAL DAILY
Qty: 90 TABLET | Refills: 0 | Status: SHIPPED | OUTPATIENT
Start: 2024-04-02 | End: 2024-06-10

## 2024-06-04 ENCOUNTER — OFFICE VISIT (OUTPATIENT)
Dept: FAMILY MEDICINE | Facility: CLINIC | Age: 74
End: 2024-06-04
Payer: COMMERCIAL

## 2024-06-04 DIAGNOSIS — D22.9 MULTIPLE PIGMENTED NEVI: ICD-10-CM

## 2024-06-04 DIAGNOSIS — L81.4 SOLAR LENTIGINOSIS: ICD-10-CM

## 2024-06-04 DIAGNOSIS — L82.1 SEBORRHEIC KERATOSES: ICD-10-CM

## 2024-06-04 DIAGNOSIS — L57.0 ACTINIC KERATOSIS: ICD-10-CM

## 2024-06-04 DIAGNOSIS — Z12.83 SKIN CANCER SCREENING: Primary | ICD-10-CM

## 2024-06-04 PROCEDURE — 99213 OFFICE O/P EST LOW 20 MIN: CPT | Performed by: PHYSICIAN ASSISTANT

## 2024-06-04 NOTE — PROGRESS NOTES
[facility] Dermatology Note  Encounter Date: Jun 4, 2024  Office Visit       Dermatology Problem List:  1.  AK s/p cryo  2. Hx radiation on R face to remove birthmark      Social hx: [place] in [place].     ____________________________________________    Assessment & Plan:    # Actinic keratoses-right lower cheek  Due to the diffuse nature of the actinic keratoses involving the face, we discussed options for treatment including shave biopsy versus topical calcipotriene 0.005% and fluorouracil 5% cream.  The patient was interested in undergoing topical therapy.  We discussed side effects including redness, pain, and sun sensitivity.  I recommended diligent avoidance of application to the eyes.  The medication should be kept away from pets. The patient had no further questions.  - Start topical calcipotriene 0.005% and fluorouracil 5% cream- apply to face daily for 7 days (advised pt to wear a big hat to protect from the sun   - If not resolved with topical therapy, will plan biopsy      # Skin cancer screening with multiple benign nevi, solar lentigines    - ABCDEs: Counseled ABCDEs of melanoma: Asymmetry, Border (irregularity), Color (not uniform, changes in color), Diameter (greater than 6 mm which is about the size of a pencil eraser), and Evolving (any changes in preexisting moles).  - Sun protection: Counseled SPF30+ sunscreen, UPF clothing, sun avoidance, tanning bed avoidance.    # Cherry angiomas and seborrheic keratoses,  Benign, reassurance given.      Procedures Performed:   None      Follow-up: 3 month(s) in-person, or earlier for new or changing lesions-recheck ak on right lower cheek    Staff and Scribe:     Scribe Disclosure:   I, Opal Tran, am serving as a scribe; to document services personally performed by Ofe Vasquez PA-C -based on data collection and the provider's statements to me.     Provider Disclosure:  I agree with above History, Review of Systems, Physical  exam and Plan.  I have reviewed the content of the documentation and have edited it as needed. I have personally performed the services documented here and the documentation accurately represents those services and the decisions I have made.      Electronically signed by:  All risk, benefits and alternatives were discussed with patient.  Patient is in agreement and understands the assessment and plan.  All questions were answered.  Sun Screen Education was given.   Return to Clinic in 3 months or sooner as needed.   Ofe Vasquez PA-C    ____________________________________________    CC: Skin Check (Spot on Right cheek - did not use chemo cream)    HPI:  Ms. Shelley Mccrary is a(n) 74 year old female who presents today as a return patient for spot on right cheek.  Last seen in clinic on 12/14/2023  Today she is here to recheck a spot on her right cheek. At her last visit, she was prescribed topical therapy, which she has not started using.   Patient is otherwise feeling well, without additional skin concerns.    Labs Reviewed:  N/A    Physical Exam:  Vitals: There were no vitals taken for this visit.  SKIN: Full skin, which includes the head/face, both arms, chest, back, abdomen,both legs, genitalia and/or groin buttocks, digits and/or nails, was examined.  Sun tanned skin.   - 3 pink scaly papules right cheek. There are a few gritty papules scattered to her upper cutaneous lip and forehead.   - There are dome shaped bright red papules on the head/neck, trunk, extremities.   - Multiple regular brown pigmented macules and papules are identified on the head/neck, trunk, extremities.   - Scattered brown macules on sun exposed areas.  - There are waxy stuck on tan to brown papules on the head/neck, trunk, extremities.   - No other lesions of concern on areas examined.         Medications:  Current Outpatient Medications   Medication Sig Dispense Refill    aspirin 81 MG tablet Take by mouth daily      atorvastatin  (LIPITOR) 40 MG tablet TAKE 1 TABLET(40 MG) BY MOUTH DAILY 90 tablet 2    cetirizine (ZYRTEC) 10 MG tablet Take 1 tablet (10 mg) by mouth every evening 30 tablet 0    diphenhydrAMINE HCl (BENADRYL PO) Take by mouth as needed       lisinopril (ZESTRIL) 5 MG tablet Take 1 tablet (5 mg) by mouth daily Appointment required for further refills 90 tablet 3    metoprolol succinate ER (TOPROL XL) 25 MG 24 hr tablet Take 1 tablet (25 mg) by mouth daily 90 tablet 0    Multiple Vitamin (MULTI-VITAMINS) TABS Take 1 tablet by mouth daily       nitroglycerin (NITROSTAT) 0.4 MG SL tablet Place 0.4 mg under the tongue every 5 minutes as needed for chest pain If you are still having symptoms after 3 doses (15 minutes) call 911. 25 tablet 3    Omega-3 Fatty Acids (OMEGA-3 FISH OIL PO) Take 2 g by mouth daily      sertraline (ZOLOFT) 25 MG tablet TAKE 1 TABLET(25 MG) BY MOUTH DAILY 90 tablet 0    COMPOUNDED NON-CONTROLLED SUBSTANCE (CMPD RX) - PHARMACY TO MIX COMPOUNDED MEDICATION Apply to forehead, temples, cheeks, nose, and jawline twice daily for 5-7 days, wash hands after application. Avoid sun. Avoid the eyes and mouth. Keep away from pets. (Patient not taking: Reported on 6/4/2024) 30 g 1     No current facility-administered medications for this visit.      Past Medical History:   Patient Active Problem List   Diagnosis    CAD (coronary artery disease)    Hyperlipidemia LDL goal <70    Disturbance of skin sensation    Adjustment disorder with mixed anxiety and depressed mood    Myocardial infarction (H)    Ischemic cardiomyopathy    Palpitations    Acute myocardial infarction of other inferior wall, initial episode of care    Anxiety    Coronary artery disease involving native coronary artery of native heart without angina pectoris    Tinnitus, bilateral    Gastroesophageal reflux disease, esophagitis presence not specified    Cardiomyopathy, unspecified (H)    Other screening mammogram    Routine history and physical  examination of adult    Benign essential hypertension    Other nonallopathic lesion of cervical region    Spasm of muscle    Sprain of neck    Temporomandibular joint disorder    Excessive ear wax, bilateral     Past Medical History:   Diagnosis Date    Anxiety     post MI    CAD (coronary artery disease) 07/29/2015    Depression     post MI    Depressive disorder     MRSA infection     hand     Myocardial infarction (H) 07/01/2015    Vertigo     related miners         CC Referred Self, MD  No address on file on close of this encounter.

## 2024-06-04 NOTE — PATIENT INSTRUCTIONS
Patient Education       Proper skin care from [facility]:    -Eliminate harsh soaps as they strip the natural oils from the skin, often resulting in dry itchy skin ( i.e. Dial, Zest, [place])  -Use mild soaps such as Cetaphil or Dove Sensitive Skin in the shower. You do not need to use soap on arms, legs, and trunk every time you shower unless visibly soiled.   -Avoid hot or cold showers.  -After showering, lightly dry off and apply moisturizing within 2-3 minutes. This will help trap moisture in the skin.   -Aggressive use of a moisturizer at least 1-2 times a day to the entire body (including -Vanicream, Cetaphil, Aquaphor or Cerave) and moisturize hands after every washing.  -We recommend using moisturizers that come in a tub that needs to be scooped out, not a pump. This has more of an oil base. It will hold moisture in your skin much better than a water base moisturizer. The above recommended are non-pore clogging.      Wear a sunscreen with at least SPF 30 on your face, ears, neck and V of the chest daily. Wear sunscreen on other areas of the body if those areas are exposed to the sun throughout the day. Sunscreens can contain physical and/or chemical blockers. Physical blockers are less likely to clog pores, these include zinc oxide and titanium dioxide. Reapply every two hour and after swimming.     Sunscreen examples: https://www.ewg.org/sunscreen/    UV radiation  UVA radiation remains constant throughout the day and throughout the year. It is a longer wavelength than UVB and therefore penetrates deeper into the skin leading to immediate and delayed tanning, photoaging, and skin cancer. 70-80% of UVA and UVB radiation occurs between the hours of 10am-2pm.  UVB radiation  UVB radiation causes the most harmful effects and is more significant during the summer months. However, snow and ice can reflect UVB radiation leading to skin damage during the winter months as well. UVB radiation is  responsible for tanning, burning, inflammation, delayed erythema (pinkness), pigmentation (brown spots), and skin cancer.     I recommend self monthly full body exams and yearly full body exams with a dermatology provider. If you develop a new or changing lesion please follow up for examination. Most skin cancers are pink and scaly or pink and pearly. However, we do see blue/brown/black skin cancers.  Consider the ABCDEs of melanoma when giving yourself your monthly full body exam ( don't forget the groin, buttocks, feet, toes, etc). A-asymmetry, B-borders, C-color, D-diameter, E-elevation or evolving. If you see any of these changes please follow up in clinic. If you cannot see your back I recommend purchasing a hand held mirror to use with a larger wall mirror.       Checking for Skin Cancer  You can find cancer early by checking your skin each month. There are 3 kinds of skin cancer. They are melanoma, basal cell carcinoma, and squamous cell carcinoma. Doing monthly skin checks is the best way to find new marks or skin changes. Follow the instructions below for checking your skin.   The ABCDEs of checking moles for melanoma   Check your moles or growths for signs of melanoma using ABCDE:   Asymmetry: the sides of the mole or growth don t match  Border: the edges are ragged, notched, or blurred  Color: the color within the mole or growth varies  Diameter: the mole or growth is larger than 6 mm (size of a pencil eraser)  Evolving: the size, shape, or color of the mole or growth is changing (evolving is not shown in the images below)    Checking for other types of skin cancer  Basal cell carcinoma or squamous cell carcinoma have symptoms such as:     A spot or mole that looks different from all other marks on your skin  Changes in how an area feels, such as itching, tenderness, or pain  Changes in the skin's surface, such as oozing, bleeding, or scaliness  A sore that does not heal  New swelling or redness beyond  the border of a mole    Who s at risk?  Anyone can get skin cancer. But you are at greater risk if you have:   Fair skin, light-colored hair, or light-colored eyes  Many moles or abnormal moles on your skin  A history of sunburns from sunlight or tanning beds  A family history of skin cancer  A history of exposure to radiation or chemicals  A weakened immune system  If you have had skin cancer in the past, you are at risk for recurring skin cancer.   How to check your skin  Do your monthly skin checkups in front of a full-length mirror. Check all parts of your body, including your:   Head (ears, face, neck, and scalp)  Torso (front, back, and sides)  Arms (tops, undersides, upper, and lower armpits)  Hands (palms, backs, and fingers, including under the nails)  Buttocks and genitals  Legs (front, back, and sides)  Feet (tops, soles, toes, including under the nails, and between toes)  If you have a lot of moles, take digital photos of them each month. Make sure to take photos both up close and from a distance. These can help you see if any moles change over time.   Most skin changes are not cancer. But if you see any changes in your skin, call your doctor right away. Only he or she can diagnose a problem. If you have skin cancer, seeing your doctor can be the first step toward getting the treatment that could save your life.   [employer] last reviewed this educational content on 4/1/2019 2000-2020 [employer]. [address] [place] [address], [place] [address]. All rights reserved. This information is not intended as a substitute for professional medical care. Always follow your healthcare professional's instructions.       When should I call my doctor?  If you are worsening or not improving, please, contact us or seek urgent care as noted below.     Who should I call with questions (adults)?  [facility] (adult and pediatric): [phone]  [facility]  [place] (adult): [phone]  [facility] ([place], [place], [place] and [place]) [phone]  For urgent needs outside of business hours call the [facility] at [phone] and ask for the dermatology resident on call to be paged  If this is a medical emergency and you are unable to reach an ER, Call [phone]      If you need a prescription refill, please contact your pharmacy. Refills are approved or denied by our Physicians during normal business hours, Monday through Fridays  Per office policy, refills will not be granted if you have not been seen within the past year (or sooner depending on your child's condition)

## 2024-06-04 NOTE — LETTER
6/4/2024      Shelley Mccrary  [address] [address]      Dear Colleague,    Thank you for referring your patient, Shelley Mccrary, to the [facility]. Please see a copy of my visit note below.    [facility] Dermatology Note  Encounter Date: Jun 4, 2024  Office Visit       Dermatology Problem List:  1.  AK s/p cryo  2. Hx radiation on R face to remove birthmark      Social hx: [place] in [place].     ____________________________________________    Assessment & Plan:    # Actinic keratoses-right lower cheek  Due to the diffuse nature of the actinic keratoses involving the face, we discussed options for treatment including shave biopsy versus topical calcipotriene 0.005% and fluorouracil 5% cream.  The patient was interested in undergoing topical therapy.  We discussed side effects including redness, pain, and sun sensitivity.  I recommended diligent avoidance of application to the eyes.  The medication should be kept away from pets. The patient had no further questions.  - Start topical calcipotriene 0.005% and fluorouracil 5% cream- apply to face daily for 7 days (advised pt to wear a big hat to protect from the sun   - If not resolved with topical therapy, will plan biopsy      # Skin cancer screening with multiple benign nevi, solar lentigines    - ABCDEs: Counseled ABCDEs of melanoma: Asymmetry, Border (irregularity), Color (not uniform, changes in color), Diameter (greater than 6 mm which is about the size of a pencil eraser), and Evolving (any changes in preexisting moles).  - Sun protection: Counseled SPF30+ sunscreen, UPF clothing, sun avoidance, tanning bed avoidance.    # Cherry angiomas and seborrheic keratoses,  Benign, reassurance given.      Procedures Performed:   None      Follow-up: 3 month(s) in-person, or earlier for new or changing lesions-recheck ak on right lower cheek    Staff and Scribe:     Scribe Disclosure:   Opal ALONSO  Marc, am serving as a scribe; to document services personally performed by Ofe Vasquez PA-C -based on data collection and the provider's statements to me.     Provider Disclosure:  I agree with above History, Review of Systems, Physical exam and Plan.  I have reviewed the content of the documentation and have edited it as needed. I have personally performed the services documented here and the documentation accurately represents those services and the decisions I have made.      Electronically signed by:  All risk, benefits and alternatives were discussed with patient.  Patient is in agreement and understands the assessment and plan.  All questions were answered.  Sun Screen Education was given.   Return to Clinic in 3 months or sooner as needed.   Ofe Vasquez PA-C    ____________________________________________    CC: Skin Check (Spot on Right cheek - did not use chemo cream)    HPI:  Ms. Shelley Mccrary is a(n) 74 year old female who presents today as a return patient for spot on right cheek.  Last seen in clinic on 12/14/2023  Today she is here to recheck a spot on her right cheek. At her last visit, she was prescribed topical therapy, which she has not started using.   Patient is otherwise feeling well, without additional skin concerns.    Labs Reviewed:  N/A    Physical Exam:  Vitals: There were no vitals taken for this visit.  SKIN: Full skin, which includes the head/face, both arms, chest, back, abdomen,both legs, genitalia and/or groin buttocks, digits and/or nails, was examined.  Sun tanned skin.   - 3 pink scaly papules right cheek. There are a few gritty papules scattered to her upper cutaneous lip and forehead.   - There are dome shaped bright red papules on the head/neck, trunk, extremities.   - Multiple regular brown pigmented macules and papules are identified on the head/neck, trunk, extremities.   - Scattered brown macules on sun exposed areas.  - There are waxy stuck on tan to  brown papules on the head/neck, trunk, extremities.   - No other lesions of concern on areas examined.         Medications:  Current Outpatient Medications   Medication Sig Dispense Refill     aspirin 81 MG tablet Take by mouth daily       atorvastatin (LIPITOR) 40 MG tablet TAKE 1 TABLET(40 MG) BY MOUTH DAILY 90 tablet 2     cetirizine (ZYRTEC) 10 MG tablet Take 1 tablet (10 mg) by mouth every evening 30 tablet 0     diphenhydrAMINE HCl (BENADRYL PO) Take by mouth as needed        lisinopril (ZESTRIL) 5 MG tablet Take 1 tablet (5 mg) by mouth daily Appointment required for further refills 90 tablet 3     metoprolol succinate ER (TOPROL XL) 25 MG 24 hr tablet Take 1 tablet (25 mg) by mouth daily 90 tablet 0     Multiple Vitamin (MULTI-VITAMINS) TABS Take 1 tablet by mouth daily        nitroglycerin (NITROSTAT) 0.4 MG SL tablet Place 0.4 mg under the tongue every 5 minutes as needed for chest pain If you are still having symptoms after 3 doses (15 minutes) call 911. 25 tablet 3     Omega-3 Fatty Acids (OMEGA-3 FISH OIL PO) Take 2 g by mouth daily       sertraline (ZOLOFT) 25 MG tablet TAKE 1 TABLET(25 MG) BY MOUTH DAILY 90 tablet 0     COMPOUNDED NON-CONTROLLED SUBSTANCE (CMPD RX) - PHARMACY TO MIX COMPOUNDED MEDICATION Apply to forehead, temples, cheeks, nose, and jawline twice daily for 5-7 days, wash hands after application. Avoid sun. Avoid the eyes and mouth. Keep away from pets. (Patient not taking: Reported on 6/4/2024) 30 g 1     No current facility-administered medications for this visit.      Past Medical History:   Patient Active Problem List   Diagnosis     CAD (coronary artery disease)     Hyperlipidemia LDL goal <70     Disturbance of skin sensation     Adjustment disorder with mixed anxiety and depressed mood     Myocardial infarction (H)     Ischemic cardiomyopathy     Palpitations     Acute myocardial infarction of other inferior wall, initial episode of care     Anxiety     Coronary artery disease  involving native coronary artery of native heart without angina pectoris     Tinnitus, bilateral     Gastroesophageal reflux disease, esophagitis presence not specified     Cardiomyopathy, unspecified (H)     Other screening mammogram     Routine history and physical examination of adult     Benign essential hypertension     Other nonallopathic lesion of cervical region     Spasm of muscle     Sprain of neck     Temporomandibular joint disorder     Excessive ear wax, bilateral     Past Medical History:   Diagnosis Date     Anxiety     post MI     CAD (coronary artery disease) 07/29/2015     Depression     post MI     Depressive disorder      MRSA infection     hand      Myocardial infarction (H) 07/01/2015     Vertigo     related miners         CC Referred Self, MD  No address on file on close of this encounter.       Again, thank you for allowing me to participate in the care of your patient.        Sincerely,        Ofe Vasquez PA-C

## 2024-06-10 DIAGNOSIS — F43.23 ADJUSTMENT DISORDER WITH MIXED ANXIETY AND DEPRESSED MOOD: ICD-10-CM

## 2024-06-10 DIAGNOSIS — I42.8 OTHER CARDIOMYOPATHY (H): ICD-10-CM

## 2024-06-10 RX ORDER — SERTRALINE HYDROCHLORIDE 25 MG/1
25 TABLET, FILM COATED ORAL DAILY
Qty: 30 TABLET | Refills: 0 | Status: SHIPPED | OUTPATIENT
Start: 2024-06-10 | End: 2024-07-24

## 2024-06-10 RX ORDER — METOPROLOL SUCCINATE 25 MG/1
25 TABLET, EXTENDED RELEASE ORAL DAILY
Qty: 30 TABLET | Refills: 0 | Status: SHIPPED | OUTPATIENT
Start: 2024-06-10 | End: 2024-07-24

## 2024-06-10 NOTE — TELEPHONE ENCOUNTER
Script refill faxed x 1. Remind pt to do follow up for med check , since she is due.    Virtual/ Video visit ok  If no appointment available soon enough

## 2024-07-24 DIAGNOSIS — F43.23 ADJUSTMENT DISORDER WITH MIXED ANXIETY AND DEPRESSED MOOD: ICD-10-CM

## 2024-07-24 DIAGNOSIS — I42.8 OTHER CARDIOMYOPATHY (H): ICD-10-CM

## 2024-07-24 RX ORDER — SERTRALINE HYDROCHLORIDE 25 MG/1
25 TABLET, FILM COATED ORAL DAILY
Qty: 30 TABLET | Refills: 0 | Status: SHIPPED | OUTPATIENT
Start: 2024-07-24 | End: 2024-09-16

## 2024-07-24 RX ORDER — METOPROLOL SUCCINATE 25 MG/1
25 TABLET, EXTENDED RELEASE ORAL DAILY
Qty: 30 TABLET | Refills: 0 | Status: SHIPPED | OUTPATIENT
Start: 2024-07-24 | End: 2024-08-28

## 2024-08-28 DIAGNOSIS — I42.8 OTHER CARDIOMYOPATHY (H): ICD-10-CM

## 2024-08-28 RX ORDER — METOPROLOL SUCCINATE 25 MG/1
25 TABLET, EXTENDED RELEASE ORAL DAILY
Qty: 30 TABLET | Refills: 1 | Status: SHIPPED | OUTPATIENT
Start: 2024-08-28 | End: 2024-10-03

## 2024-09-11 DIAGNOSIS — E78.5 HYPERLIPIDEMIA LDL GOAL <70: ICD-10-CM

## 2024-09-15 DIAGNOSIS — F43.23 ADJUSTMENT DISORDER WITH MIXED ANXIETY AND DEPRESSED MOOD: ICD-10-CM

## 2024-09-16 RX ORDER — SERTRALINE HYDROCHLORIDE 25 MG/1
25 TABLET, FILM COATED ORAL DAILY
Qty: 90 TABLET | Refills: 0 | Status: SHIPPED | OUTPATIENT
Start: 2024-09-16 | End: 2024-10-03

## 2024-09-21 RX ORDER — ATORVASTATIN CALCIUM 40 MG/1
40 TABLET, FILM COATED ORAL DAILY
Qty: 30 TABLET | Refills: 1 | Status: SHIPPED | OUTPATIENT
Start: 2024-09-21 | End: 2024-10-03

## 2024-09-22 NOTE — TELEPHONE ENCOUNTER
Script refill faxed. Remind pt to do follow up for med check and labs, since she is due now . Script refill faxed.  Ok to use my same day to hospital follow up slot , if no appointment available soon enough ( we can use my 12:30 slot 09/ 30 -)

## 2024-09-24 DIAGNOSIS — I10 BENIGN ESSENTIAL HYPERTENSION: ICD-10-CM

## 2024-09-24 RX ORDER — LISINOPRIL 5 MG/1
5 TABLET ORAL DAILY
Qty: 90 TABLET | Refills: 0 | Status: SHIPPED | OUTPATIENT
Start: 2024-09-24 | End: 2024-10-03

## 2024-09-24 NOTE — TELEPHONE ENCOUNTER
Pt calling to request a refill of her Lisinopril. Her pharmacy said it was requested last week, however no request is in her chart.     Pt has been out of this medication for a week, as of today.    Please review pended order and pharmacy ASAP.    Rhea Partida RN

## 2024-09-30 SDOH — HEALTH STABILITY: PHYSICAL HEALTH: ON AVERAGE, HOW MANY MINUTES DO YOU ENGAGE IN EXERCISE AT THIS LEVEL?: 30 MIN

## 2024-09-30 SDOH — HEALTH STABILITY: PHYSICAL HEALTH: ON AVERAGE, HOW MANY DAYS PER WEEK DO YOU ENGAGE IN MODERATE TO STRENUOUS EXERCISE (LIKE A BRISK WALK)?: 7 DAYS

## 2024-09-30 ASSESSMENT — SOCIAL DETERMINANTS OF HEALTH (SDOH): HOW OFTEN DO YOU GET TOGETHER WITH FRIENDS OR RELATIVES?: MORE THAN THREE TIMES A WEEK

## 2024-10-02 ASSESSMENT — PATIENT HEALTH QUESTIONNAIRE - PHQ9
SUM OF ALL RESPONSES TO PHQ QUESTIONS 1-9: 2
SUM OF ALL RESPONSES TO PHQ QUESTIONS 1-9: 2
10. IF YOU CHECKED OFF ANY PROBLEMS, HOW DIFFICULT HAVE THESE PROBLEMS MADE IT FOR YOU TO DO YOUR WORK, TAKE CARE OF THINGS AT HOME, OR GET ALONG WITH OTHER PEOPLE: NOT DIFFICULT AT ALL

## 2024-10-03 ENCOUNTER — OFFICE VISIT (OUTPATIENT)
Dept: FAMILY MEDICINE | Facility: CLINIC | Age: 74
End: 2024-10-03
Payer: COMMERCIAL

## 2024-10-03 VITALS
TEMPERATURE: 97.2 F | HEIGHT: 60 IN | DIASTOLIC BLOOD PRESSURE: 64 MMHG | BODY MASS INDEX: 26.02 KG/M2 | OXYGEN SATURATION: 100 % | HEART RATE: 71 BPM | RESPIRATION RATE: 21 BRPM | SYSTOLIC BLOOD PRESSURE: 126 MMHG

## 2024-10-03 DIAGNOSIS — Z23 NEED FOR IMMUNIZATION AGAINST INFLUENZA: ICD-10-CM

## 2024-10-03 DIAGNOSIS — Z78.0 ASYMPTOMATIC POSTMENOPAUSAL STATUS: ICD-10-CM

## 2024-10-03 DIAGNOSIS — H61.23 BILATERAL IMPACTED CERUMEN: ICD-10-CM

## 2024-10-03 DIAGNOSIS — I42.8 OTHER CARDIOMYOPATHY (H): ICD-10-CM

## 2024-10-03 DIAGNOSIS — Z12.31 ENCOUNTER FOR SCREENING MAMMOGRAM FOR BREAST CANCER: ICD-10-CM

## 2024-10-03 DIAGNOSIS — Z00.00 ENCOUNTER FOR MEDICARE ANNUAL WELLNESS EXAM: Primary | ICD-10-CM

## 2024-10-03 DIAGNOSIS — I25.10 CORONARY ARTERY DISEASE INVOLVING NATIVE CORONARY ARTERY OF NATIVE HEART WITHOUT ANGINA PECTORIS: ICD-10-CM

## 2024-10-03 DIAGNOSIS — I10 BENIGN ESSENTIAL HYPERTENSION: ICD-10-CM

## 2024-10-03 DIAGNOSIS — E78.5 HYPERLIPIDEMIA LDL GOAL <70: ICD-10-CM

## 2024-10-03 DIAGNOSIS — F43.23 ADJUSTMENT DISORDER WITH MIXED ANXIETY AND DEPRESSED MOOD: ICD-10-CM

## 2024-10-03 PROCEDURE — 36415 COLL VENOUS BLD VENIPUNCTURE: CPT | Performed by: FAMILY MEDICINE

## 2024-10-03 PROCEDURE — 69209 REMOVE IMPACTED EAR WAX UNI: CPT | Performed by: FAMILY MEDICINE

## 2024-10-03 PROCEDURE — 99397 PER PM REEVAL EST PAT 65+ YR: CPT | Mod: 25 | Performed by: FAMILY MEDICINE

## 2024-10-03 PROCEDURE — G0008 ADMIN INFLUENZA VIRUS VAC: HCPCS | Performed by: FAMILY MEDICINE

## 2024-10-03 PROCEDURE — 80061 LIPID PANEL: CPT | Performed by: FAMILY MEDICINE

## 2024-10-03 PROCEDURE — 90662 IIV NO PRSV INCREASED AG IM: CPT | Performed by: FAMILY MEDICINE

## 2024-10-03 PROCEDURE — 80053 COMPREHEN METABOLIC PANEL: CPT | Performed by: FAMILY MEDICINE

## 2024-10-03 PROCEDURE — 99214 OFFICE O/P EST MOD 30 MIN: CPT | Mod: 25 | Performed by: FAMILY MEDICINE

## 2024-10-03 RX ORDER — SERTRALINE HYDROCHLORIDE 25 MG/1
25 TABLET, FILM COATED ORAL DAILY
Qty: 90 TABLET | Refills: 3 | Status: SHIPPED | OUTPATIENT
Start: 2024-10-03

## 2024-10-03 RX ORDER — LISINOPRIL 5 MG/1
5 TABLET ORAL DAILY
Qty: 90 TABLET | Refills: 3 | Status: SHIPPED | OUTPATIENT
Start: 2024-10-03

## 2024-10-03 RX ORDER — METOPROLOL SUCCINATE 25 MG/1
25 TABLET, EXTENDED RELEASE ORAL DAILY
Qty: 90 TABLET | Refills: 3 | Status: SHIPPED | OUTPATIENT
Start: 2024-10-03

## 2024-10-03 RX ORDER — ATORVASTATIN CALCIUM 40 MG/1
40 TABLET, FILM COATED ORAL DAILY
Qty: 90 TABLET | Refills: 3 | Status: SHIPPED | OUTPATIENT
Start: 2024-10-03

## 2024-10-03 RX ORDER — NITROGLYCERIN 0.4 MG/1
0.4 TABLET SUBLINGUAL EVERY 5 MIN PRN
Qty: 25 TABLET | Refills: 3 | Status: SHIPPED | OUTPATIENT
Start: 2024-10-03

## 2024-10-03 ASSESSMENT — ANXIETY QUESTIONNAIRES
2. NOT BEING ABLE TO STOP OR CONTROL WORRYING: NEARLY EVERY DAY
IF YOU CHECKED OFF ANY PROBLEMS ON THIS QUESTIONNAIRE, HOW DIFFICULT HAVE THESE PROBLEMS MADE IT FOR YOU TO DO YOUR WORK, TAKE CARE OF THINGS AT HOME, OR GET ALONG WITH OTHER PEOPLE: SOMEWHAT DIFFICULT
8. IF YOU CHECKED OFF ANY PROBLEMS, HOW DIFFICULT HAVE THESE MADE IT FOR YOU TO DO YOUR WORK, TAKE CARE OF THINGS AT HOME, OR GET ALONG WITH OTHER PEOPLE?: SOMEWHAT DIFFICULT
GAD7 TOTAL SCORE: 15
GAD7 TOTAL SCORE: 15
1. FEELING NERVOUS, ANXIOUS, OR ON EDGE: NEARLY EVERY DAY
7. FEELING AFRAID AS IF SOMETHING AWFUL MIGHT HAPPEN: NEARLY EVERY DAY
6. BECOMING EASILY ANNOYED OR IRRITABLE: MORE THAN HALF THE DAYS
5. BEING SO RESTLESS THAT IT IS HARD TO SIT STILL: SEVERAL DAYS
3. WORRYING TOO MUCH ABOUT DIFFERENT THINGS: NEARLY EVERY DAY
4. TROUBLE RELAXING: NOT AT ALL
GAD7 TOTAL SCORE: 15
7. FEELING AFRAID AS IF SOMETHING AWFUL MIGHT HAPPEN: NEARLY EVERY DAY

## 2024-10-03 ASSESSMENT — PAIN SCALES - GENERAL: PAINLEVEL: NO PAIN (0)

## 2024-10-03 ASSESSMENT — ACTIVITIES OF DAILY LIVING (ADL): CURRENT_FUNCTION: NO ASSISTANCE NEEDED

## 2024-10-03 NOTE — PROGRESS NOTES
"Preventive Care Visit  [facility] MD, Family Medicine  Oct 3, 2024      Assessment & Plan     Encounter for Medicare annual wellness exam      Benign essential hypertension  Well-controlled  - COMPREHENSIVE METABOLIC PANEL; Future  - lisinopril (ZESTRIL) 5 MG tablet; Take 1 tablet (5 mg) by mouth daily.  - COMPREHENSIVE METABOLIC PANEL    Coronary artery disease involving native coronary artery of native heart without angina pectoris  Patient is currently asymptomatic.  She is overdue for her follow-up with cardiology as it has been 2 years now since the last  - Lipid panel reflex to direct LDL Fasting; Future  - Lipid panel reflex to direct LDL Fasting    Hyperlipidemia LDL goal <70  Previously LDL noted to be above 80.  We may need to consider adjusting the medication dose if needed  - atorvastatin (LIPITOR) 40 MG tablet; Take 1 tablet (40 mg) by mouth daily.    Other cardiomyopathy (H)  Blood pressure is well-managed.  Continue metoprolol.  - metoprolol succinate ER (TOPROL XL) 25 MG 24 hr tablet; Take 1 tablet (25 mg) by mouth daily.    Adjustment disorder with mixed anxiety and depressed mood  Patient's been more anxious lately as she is waiting  to get the results on a lump biopsy done on her daughter  .  Recommending to continue current dose of sertraline for now.    - sertraline (ZOLOFT) 25 MG tablet; Take 1 tablet (25 mg) by mouth daily.    Bilateral impacted cerumen  Bilateral cerumen impaction removed with irrigation  - REMOVE IMPACTED CERUMEN    Encounter for screening mammogram for breast cancer    - MA Screen Bilateral w/Favio; Future    Need for immunization against influenza    - INFLUENZA HIGH DOSE, TRIVALENT, PF (FLUZONE)            BMI  Estimated body mass index is 26.02 kg/m  as calculated from the following:    Height as of this encounter: 1.534 m (5' 0.39\").    Weight as of 12/11/23: 61.2 kg (135 lb).     Counseling  Appropriate preventive services were " "addressed with this patient via screening, questionnaire, or discussion as appropriate for  nutrition and physical activity.    Checklist reviewing preventive services available has been discussed with the patient.            Cristiano Rosa is a 74 year old, presenting for the following:  Annual Visit        10/3/2024     9:24 AM   Additional Questions   Roomed by Emily VARELA         Health Care Directive  Patient does not have a Health Care Directive or Living Will: Discussed advance care planning with patient; however, patient declined at this time.    Healthy Habits:     In general, how would you rate your overall health?  Excellent    Frequency of exercise:  6-7 days/week    Duration of exercise:  30-45 minutes    Do you usually eat at least 4 servings of fruit and vegetables a day, include whole grains    & fiber and avoid regularly eating high fat or \"junk\" foods?  Yes    Taking medications regularly:  Yes    Barriers to taking medications:  None    Medication side effects:  None    Ability to successfully perform activities of daily living:  No assistance needed    Home Safety:  No safety concerns identified    Hearing Impairment:  Feel that people are mumbling or not speaking clearly, need to ask people to speak up or repeat themselves, find that men's voices are easier to understand than woman's, difficulty following a conversation in a noisy restaurant or crowded room and difficulty understanding soft or whispered speech    In the past 6 months, have you been bothered by leaking of urine?  No    In general, how would you rate your overall mental or emotional health?  Poor    Additional concerns today:  No  Answers submitted by the patient for this visit:  Patient Health Questionnaire (Submitted on 10/2/2024)  If you checked off any problems, how difficult have these problems made it for you to do your work, take care of things at home, or get along with other people?: Not difficult at all  PHQ9 TOTAL SCORE: " 2  Patient Health Questionnaire (G7) (Submitted on 10/3/2024)  KALI 7 TOTAL SCORE: 15                9/30/2024   General Health   How would you rate your overall physical health? Excellent   Feel stress (tense, anxious, or unable to sleep) To some extent      (!) STRESS CONCERN      9/30/2024   Nutrition   Diet: Low salt    Low fat/cholesterol       Multiple values from one day are sorted in reverse-chronological order         9/30/2024   Exercise   Days per week of moderate/strenous exercise 7 days   Average minutes spent exercising at this level 30 min            9/30/2024   Social Factors   Frequency of gathering with friends or relatives More than three times a week   Worry food won't last until get money to buy more No   Food not last or not have enough money for food? No   Do you have housing? (Housing is defined as stable permanent housing and does not include staying ouside in a car, in a tent, in an abandoned building, in an overnight shelter, or couch-surfing.) Yes   Are you worried about losing your housing? No   Lack of transportation? No   Unable to get utilities (heat,electricity)? No            9/30/2024   Fall Risk   Fallen 2 or more times in the past year? No    No   Trouble with walking or balance? No    No       Multiple values from one day are sorted in reverse-chronological order          9/30/2024   Activities of Daily Living- Home Safety   Needs help with the following daily activites None of the above   Safety concerns in the home None of the above            9/30/2024   Dental   Dentist two times every year? (!) NO            9/30/2024   Hearing Screening   Hearing concerns? (!) I FEEL THAT PEOPLE ARE MUMBLING OR NOT SPEAKING CLEARLY.    (!) I NEED TO ASK PEOPLE TO SPEAK UP OR REPEAT THEMSELVES.    (!) IT'S HARDER TO UNDERSTAND WOMEN'S VOICES THAN MEN'S VOICES.    (!) IT'S HARD TO FOLLOW A CONVERSATION IN A NOISY RESTAURANT OR CROWDED ROOM.    (!) TROUBLE UNDERSTANDING SOFT OR WHISPERED  SPEECH.       Multiple values from one day are sorted in reverse-chronological order         2024   Driving Risk Screening   Patient/family members have concerns about driving No            2024   General Alertness/Fatigue Screening   Have you been more tired than usual lately? No            2024   Urinary Incontinence Screening   Bothered by leaking urine in past 6 months No            2024   TB Screening   Were you born outside of the [place]? No          Today's PHQ-9 Score:       10/2/2024    10:48 AM   PHQ-9 SCORE   PHQ-9 Total Score MyChart 2 (Minimal depression)   PHQ-9 Total Score 2         2024   Substance Use   Alcohol more than 3/day or more than 7/wk Not Applicable   Do you have a current opioid prescription? No   How severe/bad is pain from 1 to 10? 1/10   Do you use any other substances recreationally? No        Social History     Tobacco Use    Smoking status: Former     Current packs/day: 0.00     Average packs/day: 0.5 packs/day for 10.0 years (5.0 ttl pk-yrs)     Types: Cigarettes     Start date: 1980     Quit date: 1990     Years since quittin.7    Smokeless tobacco: Never    Tobacco comments:     smoked 1-2 pack week, 30 years   Vaping Use    Vaping status: Never Used   Substance Use Topics    Alcohol use: No    Drug use: Never           2022   LAST FHS-7 RESULTS   1st degree relative breast or ovarian cancer No   Any relative bilateral breast cancer No   Any male have breast cancer No   Any ONE woman have BOTH breast AND ovarian cancer No   Any woman with breast cancer before 50yrs No   2 or more relatives with breast AND/OR ovarian cancer No   2 or more relatives with breast AND/OR bowel cancer No           Mammogram Screening - After age 74- determine frequency with patient based on health status, life expectancy and patient goals    ASCVD Risk   The ASCVD Risk score (Emily URBAN, et al., 2019) failed to calculate for the following reasons:    The  patient has a prior MI or stroke diagnosis            Reviewed and updated as needed this visit by Provider    Allergies                 Patient Active Problem List   Diagnosis    CAD (coronary artery disease)    Hyperlipidemia LDL goal <70    Disturbance of skin sensation    Adjustment disorder with mixed anxiety and depressed mood    Myocardial infarction (H)    Ischemic cardiomyopathy    Palpitations    Acute myocardial infarction of other inferior wall, initial episode of care    Anxiety    Coronary artery disease involving native coronary artery of native heart without angina pectoris    Tinnitus, bilateral    Gastroesophageal reflux disease, esophagitis presence not specified    Cardiomyopathy, unspecified (H)    Other screening mammogram    Routine history and physical examination of adult    Benign essential hypertension    Other nonallopathic lesion of cervical region    Spasm of muscle    Sprain of neck    Temporomandibular joint disorder    Excessive ear wax, bilateral     Past Surgical History:   Procedure Laterality Date    ANGIOPLASTY  2015    MAURISIO to mid circ, thrombectomy -MAURISIO to proximal, mid, distal RCA , successful balloon to 1st RPL done Resolute stents used procedure done [facility]    COLONOSCOPY      ENT SURGERY      INNER EAR SURGERY      redesign te emilia bc of miners diseas     ORTHOPEDIC SURGERY      Broken ankle       Social History     Tobacco Use    Smoking status: Former     Current packs/day: 0.00     Average packs/day: 0.5 packs/day for 10.0 years (5.0 ttl pk-yrs)     Types: Cigarettes     Start date: 1980     Quit date: 1990     Years since quittin.7    Smokeless tobacco: Never    Tobacco comments:     smoked 1-2 pack week, 30 years   Substance Use Topics    Alcohol use: No     Family History   Problem Relation Age of Onset    Coronary Artery Disease Father         at age 39, and other heart problem     Hyperlipidemia Father         ?     Hypertension  Mother     Diabetes No family hx of     Cerebrovascular Disease No family hx of     Breast Cancer No family hx of     Colon Cancer No family hx of          Current Outpatient Medications   Medication Sig Dispense Refill    aspirin 81 MG tablet Take by mouth daily      atorvastatin (LIPITOR) 40 MG tablet Take 1 tablet (40 mg) by mouth daily. 90 tablet 3    cetirizine (ZYRTEC) 10 MG tablet Take 1 tablet (10 mg) by mouth every evening 30 tablet 0    COMPOUNDED NON-CONTROLLED SUBSTANCE (CMPD RX) - PHARMACY TO MIX COMPOUNDED MEDICATION Apply to forehead, temples, cheeks, nose, and jawline twice daily for 5-7 days, wash hands after application. Avoid sun. Avoid the eyes and mouth. Keep away from pets. 30 g 1    diphenhydrAMINE HCl (BENADRYL PO) Take by mouth as needed       lisinopril (ZESTRIL) 5 MG tablet Take 1 tablet (5 mg) by mouth daily. 90 tablet 3    metoprolol succinate ER (TOPROL XL) 25 MG 24 hr tablet Take 1 tablet (25 mg) by mouth daily. 90 tablet 3    Multiple Vitamin (MULTI-VITAMINS) TABS Take 1 tablet by mouth daily       nitroGLYcerin (NITROSTAT) 0.4 MG sublingual tablet Place 1 tablet (0.4 mg) under the tongue every 5 minutes as needed for chest pain. If you are still having symptoms after 3 doses (15 minutes) call 911. 25 tablet 3    Omega-3 Fatty Acids (OMEGA-3 FISH OIL PO) Take 2 g by mouth daily      sertraline (ZOLOFT) 25 MG tablet Take 1 tablet (25 mg) by mouth daily. 90 tablet 3     No Known Allergies  Current providers sharing in care for this patient include:  Patient Care Team:  Fabiola Mahmood MD as PCP - General (Family Practice)  Bill Pham MD as MD (Dermatology)  Megan Sidhu PA-C as Physician Assistant (Dermatology)  Fabiola Mahmood MD as Assigned PCP  Eliz Courtney PA-C as Physician Assistant (Dermatology)  Ofe Vasquez PA-C as Assigned Surgical Provider  Carol Hummel PA-C as Physician Assistant (Dermatology)    The following health  "maintenance items are reviewed in Epic and correct as of today:  Health Maintenance   Topic Date Due    ZOSTER IMMUNIZATION (1 of 2) Never done    RSV VACCINE (1 - 1-dose 75+ series) Never done    BMP  03/28/2024    COVID-19 Vaccine (2 - 2024-25 season) 09/01/2024    CMP  09/28/2024    LIPID  09/28/2024    MEDICARE ANNUAL WELLNESS VISIT  09/28/2024    KALI ASSESSMENT  04/03/2025    PHQ-9  04/03/2025    ANNUAL REVIEW OF HM ORDERS  10/03/2025    FALL RISK ASSESSMENT  10/03/2025    GLUCOSE  09/28/2026    DTAP/TDAP/TD IMMUNIZATION (5 - Td or Tdap) 12/11/2028    ADVANCE CARE PLANNING  10/03/2029    DEXA  11/16/2031    HEPATITIS C SCREENING  Completed    PHQ-2 (once per calendar year)  Completed    INFLUENZA VACCINE  Completed    Pneumococcal Vaccine: 65+ Years  Completed    HPV IMMUNIZATION  Aged Out    MENINGITIS IMMUNIZATION  Aged Out    RSV MONOCLONAL ANTIBODY  Aged Out    MAMMO SCREENING  Discontinued    COLORECTAL CANCER SCREENING  Discontinued         Review of Systems  CONSTITUTIONAL: NEGATIVE for fever, chills, change in weight  INTEGUMENTARY/SKIN: NEGATIVE for worrisome rashes, moles or lesions  EYES: NEGATIVE for vision changes or irritation  ENT/MOUTH: NEGATIVE for ear, mouth and throat problems  RESP: NEGATIVE for significant cough or SOB  BREAST: NEGATIVE for masses, tenderness or discharge  CV: NEGATIVE for chest pain, palpitations or peripheral edema  GI: NEGATIVE for nausea, abdominal pain, heartburn, or change in bowel habits  : NEGATIVE for frequency, dysuria, or hematuria  MUSCULOSKELETAL: NEGATIVE for significant arthralgias or myalgia  NEURO: NEGATIVE for weakness, dizziness or paresthesias  ENDOCRINE: NEGATIVE for temperature intolerance, skin/hair changes  HEME: NEGATIVE for bleeding problems  PSYCHIATRIC: NEGATIVE for changes in mood or affect     Objective    Exam  /64   Pulse 71   Temp 97.2  F (36.2  C)   Resp 21   Ht 1.534 m (5' 0.39\")   SpO2 100%   BMI 26.02 kg/m     Estimated " "body mass index is 26.02 kg/m  as calculated from the following:    Height as of this encounter: 1.534 m (5' 0.39\").    Weight as of 12/11/23: 61.2 kg (135 lb).    Physical Exam  GENERAL: alert and no distress  EYES: Eyes grossly normal to inspection, PERRL and conjunctivae and sclerae normal  HENT: ear canals and TM's normal, nose and mouth without ulcers or lesions  NECK: no adenopathy, no asymmetry, masses, or scars  RESP: lungs clear to auscultation - no rales, rhonchi or wheezes  BREAST: normal without masses, tenderness or nipple discharge and no palpable axillary masses or adenopathy  CV: regular rate and rhythm, normal S1 S2, no S3 or S4, no murmur, click or rub, no peripheral edema  ABDOMEN: soft, nontender, no hepatosplenomegaly, no masses and bowel sounds normal  MS: no gross musculoskeletal defects noted, no edema  SKIN: no suspicious lesions or rashes  NEURO: Normal strength and tone, mentation intact and speech normal  PSYCH: mentation appears normal, affect normal/bright         10/3/2024   Mini Cog   Clock Draw Score 2 Normal   3 Item Recall 3 objects recalled   Mini Cog Total Score 5                 Signed Electronically by: Ben Ortiz MD    "

## 2024-10-03 NOTE — NURSING NOTE
Patient identified using two patient identifiers.  Ear exam showing wax occlusion completed by provider.  Solution: warm water was placed in the bilateral ear(s) via irrigation tool: elephant ear.  Patient tolerated well.

## 2024-10-04 LAB
ALBUMIN SERPL BCG-MCNC: 4.3 G/DL (ref 3.5–5.2)
ALP SERPL-CCNC: 74 U/L (ref 40–150)
ALT SERPL W P-5'-P-CCNC: 27 U/L (ref 0–50)
ANION GAP SERPL CALCULATED.3IONS-SCNC: 8 MMOL/L (ref 7–15)
AST SERPL W P-5'-P-CCNC: 38 U/L (ref 0–45)
BILIRUB SERPL-MCNC: 0.5 MG/DL
BUN SERPL-MCNC: 17.1 MG/DL (ref 8–23)
CALCIUM SERPL-MCNC: 9.7 MG/DL (ref 8.8–10.4)
CHLORIDE SERPL-SCNC: 105 MMOL/L (ref 98–107)
CHOLEST SERPL-MCNC: 152 MG/DL
CREAT SERPL-MCNC: 0.9 MG/DL (ref 0.51–0.95)
EGFRCR SERPLBLD CKD-EPI 2021: 66 ML/MIN/1.73M2
FASTING STATUS PATIENT QL REPORTED: YES
FASTING STATUS PATIENT QL REPORTED: YES
GLUCOSE SERPL-MCNC: 93 MG/DL (ref 70–99)
HCO3 SERPL-SCNC: 26 MMOL/L (ref 22–29)
HDLC SERPL-MCNC: 64 MG/DL
LDLC SERPL CALC-MCNC: 79 MG/DL
NONHDLC SERPL-MCNC: 88 MG/DL
POTASSIUM SERPL-SCNC: 5 MMOL/L (ref 3.4–5.3)
PROT SERPL-MCNC: 7.1 G/DL (ref 6.4–8.3)
SODIUM SERPL-SCNC: 139 MMOL/L (ref 135–145)
TRIGL SERPL-MCNC: 46 MG/DL

## 2024-10-16 ENCOUNTER — ANCILLARY PROCEDURE (OUTPATIENT)
Dept: MAMMOGRAPHY | Facility: CLINIC | Age: 74
End: 2024-10-16
Attending: FAMILY MEDICINE
Payer: COMMERCIAL

## 2024-10-16 DIAGNOSIS — Z12.31 ENCOUNTER FOR SCREENING MAMMOGRAM FOR BREAST CANCER: ICD-10-CM

## 2024-10-16 PROCEDURE — 77067 SCR MAMMO BI INCL CAD: CPT | Mod: TC | Performed by: RADIOLOGY

## 2024-10-16 PROCEDURE — 77063 BREAST TOMOSYNTHESIS BI: CPT | Mod: TC | Performed by: RADIOLOGY

## 2024-12-09 ENCOUNTER — TELEPHONE (OUTPATIENT)
Dept: FAMILY MEDICINE | Facility: CLINIC | Age: 74
End: 2024-12-09

## 2024-12-09 ENCOUNTER — OFFICE VISIT (OUTPATIENT)
Dept: DERMATOLOGY | Facility: CLINIC | Age: 74
End: 2024-12-09
Payer: COMMERCIAL

## 2024-12-09 ENCOUNTER — E-VISIT (OUTPATIENT)
Dept: FAMILY MEDICINE | Facility: CLINIC | Age: 74
End: 2024-12-09
Payer: COMMERCIAL

## 2024-12-09 DIAGNOSIS — D22.9 MULTIPLE BENIGN NEVI: Primary | ICD-10-CM

## 2024-12-09 DIAGNOSIS — L90.0 LICHEN SCLEROSUS: ICD-10-CM

## 2024-12-09 DIAGNOSIS — N30.00 ACUTE CYSTITIS WITHOUT HEMATURIA: Primary | ICD-10-CM

## 2024-12-09 DIAGNOSIS — L81.4 LENTIGINES: ICD-10-CM

## 2024-12-09 DIAGNOSIS — L82.1 SK (SEBORRHEIC KERATOSIS): ICD-10-CM

## 2024-12-09 DIAGNOSIS — L57.0 AK (ACTINIC KERATOSIS): ICD-10-CM

## 2024-12-09 DIAGNOSIS — D18.01 CHERRY ANGIOMA: ICD-10-CM

## 2024-12-09 RX ORDER — CLOBETASOL PROPIONATE 0.5 MG/G
CREAM TOPICAL 2 TIMES DAILY
Qty: 45 G | Refills: 1 | Status: SHIPPED | OUTPATIENT
Start: 2024-12-09

## 2024-12-09 RX ORDER — NITROFURANTOIN 25; 75 MG/1; MG/1
100 CAPSULE ORAL 2 TIMES DAILY
Qty: 10 CAPSULE | Refills: 0 | Status: SHIPPED | OUTPATIENT
Start: 2024-12-09 | End: 2024-12-14

## 2024-12-09 NOTE — TELEPHONE ENCOUNTER
Called patient and she stated that she has been having burning with urination for 4 days. Patient was advised to do an E-Visit and Appifier message with link was sent.     Patient is also wondering if the provider would be okay with ordering a cologuard. Patient last had it done 1/2/2023 and it was negative.     Dora ARCHER RN  [facility] Triage Team

## 2024-12-09 NOTE — PROGRESS NOTES
[facility] Dermatology Note  Encounter Date: Dec 9, 2024  Office Visit      Dermatology Problem List:  FBSE: 12/9/24    1.  AK s/p cryo  2. Lichen sclerosis   - Tx: Clobetasol 0.05% cream      PMHx: radiation on R face to remove birthmark   Social hx: [place] in [place].   ____________________________________________    Assessment & Plan:  # Lichen Sclerosis - flaring today  - Discussed the etiology of this condition as well as treatment and their side effects  - Start on clobetasol 0.05% cream, apply BID    # Actinic keratosis. X 4 R dorsal hand  - cryotherapy performed today, see procedure note below.    # Benign findings: multiple benign nevi, lentigines, cherry angiomas, SKs  - edu on benign etiology  - Signs and Symptoms of non-melanoma skin cancer and ABCDEs of melanoma reviewed with patient. Patient encouraged to perform monthly self skin exams and educated on how to perform them. UV precautions reviewed with patient. Patient was asked about new or changing moles/lesions on body.   - Sunscreen: Apply 20 minutes prior to going outdoors and reapply every two hours, when wet or sweating. We recommend using an SPF 30 or higher, and to use one that is water resistant.     - RTC for changes     Procedures Performed:   CRYOTHERAPY PROCEDURE NOTE: 4 lesions in the above locations were treated with liquid nitrogen utilizing a 5-10sec thaw time. Patient was advised that the treated areas will become red, swollen, may develop a blister and then should crust and peal off in the next 1-2 weeks. Post-procedure instructions were provided.      Follow-up: 1 year(s) in-person, or earlier for new or changing lesions    Staff and scribe:     Scribe Disclosure:   I, JESSY PATEL, am serving as a scribe; to document services personally performed by Carol Hummel PA-C -based on data collection and the provider's statements to me.     Provider Disclosure:  I agree with above History, Review of Systems,  Physical exam and Plan.  I have reviewed the content of the documentation and have edited it as needed. I have personally performed the services documented here and the documentation accurately represents those services and the decisions I have made.      Electronically signed by:    All risks, benefits and alternatives were discussed with patient.  Patient is in agreement and understands the assessment and plan.  All questions were answered.    Carol Hummel PA-C, MPAS  [facility]: Phone: [phone], Fax: [phone]  [facility]: Phone: [phone],  Fax: [phone]  [facility]: Phone: [phone], Fax: [phone]  ____________________________________________    CC: No chief complaint on file.      Reviewed patients past medical history and pertinent chart review prior to patient's visit today.     HPI:  Ms. Shelley Mccrary is a 74 year old female who [place] [facility] [place] patient did not report any spots of concern. Patient did report that she is having a lichen sclerosis outbreak. In the past she has used clobetasol with success. Flares on not frequent and therefore she is requesting a new rx for clobetasol. No hx skin cancer.     Patient is otherwise feeling well, without additional concerns.    Labs:  N/A    Physical Exam:  Vitals: There were no vitals taken for this visit.  SKIN: Total skin excluding the undergarment areas was performed. The exam included the head/face, neck, both arms, chest, back, abdomen, both legs, digits and/or nails.  Patient deferred genital exam.   - - Baird's skin type II, has <100 nevi  - There are dome shaped bright red papules on the trunk.   - Multiple regular brown pigmented macules and papules are identified on the trunk and extremities.   - Scattered brown macules on sun exposed areas.  - There are waxy stuck on tan to  brown papules on the trunk.    - There is/are erythematous macules with overlying adherent scale on the x 4 R dorsal hand   - No other lesions of concern on areas examined.     Medications:  Current Outpatient Medications   Medication Sig Dispense Refill    aspirin 81 MG tablet Take by mouth daily      atorvastatin (LIPITOR) 40 MG tablet Take 1 tablet (40 mg) by mouth daily. 90 tablet 3    cetirizine (ZYRTEC) 10 MG tablet Take 1 tablet (10 mg) by mouth every evening 30 tablet 0    COMPOUNDED NON-CONTROLLED SUBSTANCE (CMPD RX) - PHARMACY TO MIX COMPOUNDED MEDICATION Apply to forehead, temples, cheeks, nose, and jawline twice daily for 5-7 days, wash hands after application. Avoid sun. Avoid the eyes and mouth. Keep away from pets. 30 g 1    diphenhydrAMINE HCl (BENADRYL PO) Take by mouth as needed       lisinopril (ZESTRIL) 5 MG tablet Take 1 tablet (5 mg) by mouth daily. 90 tablet 3    metoprolol succinate ER (TOPROL XL) 25 MG 24 hr tablet Take 1 tablet (25 mg) by mouth daily. 90 tablet 3    Multiple Vitamin (MULTI-VITAMINS) TABS Take 1 tablet by mouth daily       nitroGLYcerin (NITROSTAT) 0.4 MG sublingual tablet Place 1 tablet (0.4 mg) under the tongue every 5 minutes as needed for chest pain. If you are still having symptoms after 3 doses (15 minutes) call 911. 25 tablet 3    Omega-3 Fatty Acids (OMEGA-3 FISH OIL PO) Take 2 g by mouth daily      sertraline (ZOLOFT) 25 MG tablet Take 1 tablet (25 mg) by mouth daily. 90 tablet 3     No current facility-administered medications for this visit.      Past Medical/Surgical History:   Patient Active Problem List   Diagnosis    CAD (coronary artery disease)    Hyperlipidemia LDL goal <70    Disturbance of skin sensation    Adjustment disorder with mixed anxiety and depressed mood    Myocardial infarction (H)    Ischemic cardiomyopathy    Palpitations    Acute myocardial infarction of other inferior wall, initial episode of care    Anxiety    Coronary artery disease  involving native coronary artery of native heart without angina pectoris    Tinnitus, bilateral    Gastroesophageal reflux disease, esophagitis presence not specified    Cardiomyopathy, unspecified (H)    Other screening mammogram    Routine history and physical examination of adult    Benign essential hypertension    Other nonallopathic lesion of cervical region    Spasm of muscle    Sprain of neck    Temporomandibular joint disorder    Excessive ear wax, bilateral     Past Medical History:   Diagnosis Date    Anxiety     post MI    CAD (coronary artery disease) 07/29/2015    Depression     post MI    Depressive disorder     MRSA infection     hand     Myocardial infarction (H) 07/01/2015    Vertigo     related miners

## 2024-12-09 NOTE — TELEPHONE ENCOUNTER
Patient is in clinic right now with skin clinic. Patient states she has a bladder infection and would like to leave a sample. Would Dr. Ortiz put in an order? Does she need an appt?  Please call patient at [phone].    Thank you  Guera Cerrato

## 2024-12-09 NOTE — LETTER
12/9/2024      Shelley Mccrary  [address] [address]      Dear Colleague,    Thank you for referring your patient, Shelley Mccrary, to the [facility]. Please see a copy of my visit note below.    [facility] Dermatology Note  Encounter Date: Dec 9, 2024  Office Visit      Dermatology Problem List:  FBSE: 12/9/24    1.  AK s/p cryo  2. Lichen sclerosis   - Tx: Clobetasol 0.05% cream      PMHx: radiation on R face to remove birthmark   Social hx: [place] in [place].   ____________________________________________    Assessment & Plan:  # Lichen Sclerosis - flaring today  - Discussed the etiology of this condition as well as treatment and their side effects  - Start on clobetasol 0.05% cream, apply BID    # Actinic keratosis. X 4 R dorsal hand  - cryotherapy performed today, see procedure note below.    # Benign findings: multiple benign nevi, lentigines, cherry angiomas, SKs  - edu on benign etiology  - Signs and Symptoms of non-melanoma skin cancer and ABCDEs of melanoma reviewed with patient. Patient encouraged to perform monthly self skin exams and educated on how to perform them. UV precautions reviewed with patient. Patient was asked about new or changing moles/lesions on body.   - Sunscreen: Apply 20 minutes prior to going outdoors and reapply every two hours, when wet or sweating. We recommend using an SPF 30 or higher, and to use one that is water resistant.     - RTC for changes     Procedures Performed:   CRYOTHERAPY PROCEDURE NOTE: 4 lesions in the above locations were treated with liquid nitrogen utilizing a 5-10sec thaw time. Patient was advised that the treated areas will become red, swollen, may develop a blister and then should crust and peal off in the next 1-2 weeks. Post-procedure instructions were provided.      Follow-up: 1 year(s) in-person, or earlier for new or changing lesions    Staff and scribe:     Scribe Disclosure:    I, JESSY PATEL, am serving as a scribe; to document services personally performed by Carol Hummel PA-C -based on data collection and the provider's statements to me.     Provider Disclosure:  I agree with above History, Review of Systems, Physical exam and Plan.  I have reviewed the content of the documentation and have edited it as needed. I have personally performed the services documented here and the documentation accurately represents those services and the decisions I have made.      Electronically signed by:    All risks, benefits and alternatives were discussed with patient.  Patient is in agreement and understands the assessment and plan.  All questions were answered.    Carol Hummel PA-C, [facility]  [facility]: Phone: [phone], Fax: [phone]  [facility]: Phone: [phone],  Fax: [phone]  [facility]: Phone: [phone], Fax: [phone]  ____________________________________________    CC: No chief complaint on file.      Reviewed patients past medical history and pertinent chart review prior to patient's visit today.     HPI:  Ms. Shelley Mccrary is a 74 year old female who presents today as a [place] [facility] [place]. Today patient did not report any spots of concern. Patient did report that she is having a lichen sclerosis outbreak. In the past she has used clobetasol with success. Flares on not frequent and therefore she is requesting a new rx for clobetasol. No hx skin cancer.     Patient is otherwise feeling well, without additional concerns.    Labs:  N/A    Physical Exam:  Vitals: There were no vitals taken for this visit.  SKIN: Total skin excluding the undergarment areas was performed. The exam included the head/face, neck, both arms, chest, back, abdomen, both legs, digits and/or nails.  Patient deferred genital exam.   - - Baird's skin type II, has <100  nevi  - There are dome shaped bright red papules on the trunk.   - Multiple regular brown pigmented macules and papules are identified on the trunk and extremities.   - Scattered brown macules on sun exposed areas.  - There are waxy stuck on tan to brown papules on the trunk.    - There is/are erythematous macules with overlying adherent scale on the x 4 R dorsal hand   - No other lesions of concern on areas examined.     Medications:  Current Outpatient Medications   Medication Sig Dispense Refill     aspirin 81 MG tablet Take by mouth daily       atorvastatin (LIPITOR) 40 MG tablet Take 1 tablet (40 mg) by mouth daily. 90 tablet 3     cetirizine (ZYRTEC) 10 MG tablet Take 1 tablet (10 mg) by mouth every evening 30 tablet 0     COMPOUNDED NON-CONTROLLED SUBSTANCE (CMPD RX) - PHARMACY TO MIX COMPOUNDED MEDICATION Apply to forehead, temples, cheeks, nose, and jawline twice daily for 5-7 days, wash hands after application. Avoid sun. Avoid the eyes and mouth. Keep away from pets. 30 g 1     diphenhydrAMINE HCl (BENADRYL PO) Take by mouth as needed        lisinopril (ZESTRIL) 5 MG tablet Take 1 tablet (5 mg) by mouth daily. 90 tablet 3     metoprolol succinate ER (TOPROL XL) 25 MG 24 hr tablet Take 1 tablet (25 mg) by mouth daily. 90 tablet 3     Multiple Vitamin (MULTI-VITAMINS) TABS Take 1 tablet by mouth daily        nitroGLYcerin (NITROSTAT) 0.4 MG sublingual tablet Place 1 tablet (0.4 mg) under the tongue every 5 minutes as needed for chest pain. If you are still having symptoms after 3 doses (15 minutes) call 911. 25 tablet 3     Omega-3 Fatty Acids (OMEGA-3 FISH OIL PO) Take 2 g by mouth daily       sertraline (ZOLOFT) 25 MG tablet Take 1 tablet (25 mg) by mouth daily. 90 tablet 3     No current facility-administered medications for this visit.      Past Medical/Surgical History:   Patient Active Problem List   Diagnosis     CAD (coronary artery disease)     Hyperlipidemia LDL goal <70     Disturbance of skin  sensation     Adjustment disorder with mixed anxiety and depressed mood     Myocardial infarction (H)     Ischemic cardiomyopathy     Palpitations     Acute myocardial infarction of other inferior wall, initial episode of care     Anxiety     Coronary artery disease involving native coronary artery of native heart without angina pectoris     Tinnitus, bilateral     Gastroesophageal reflux disease, esophagitis presence not specified     Cardiomyopathy, unspecified (H)     Other screening mammogram     Routine history and physical examination of adult     Benign essential hypertension     Other nonallopathic lesion of cervical region     Spasm of muscle     Sprain of neck     Temporomandibular joint disorder     Excessive ear wax, bilateral     Past Medical History:   Diagnosis Date     Anxiety     post MI     CAD (coronary artery disease) 07/29/2015     Depression     post MI     Depressive disorder      MRSA infection     hand      Myocardial infarction (H) 07/01/2015     Vertigo     related miners                         Again, thank you for allowing me to participate in the care of your patient.        Sincerely,        Carol Hummel PA-C

## 2024-12-09 NOTE — PATIENT INSTRUCTIONS
Cryotherapy    What is it?  Use of a very cold liquid, such as liquid nitrogen, to freeze and destroy abnormal skin cells that need to be removed    What should I expect?  Tenderness and redness  A small blister that might grow and fill with dark purple blood. There may be crusting.  More than one treatment may be needed if the lesions do not go away.    How do I care for the treated area?  Gently wash the area with your hands when bathing.  Use a thin layer of Vaseline to help with healing. You may use a Band-Aid.   The area should heal within 7-10 days and may leave behind a pink or lighter color.   Do not use an antibiotic or Neosporin ointment.   You may take acetaminophen (Tylenol) for pain.     Call your Doctor if you have:  Severe pain  Signs of infection (warmth, redness, cloudy yellow drainage, and or a bad smell)  Questions or concerns    Who should I call with questions?      [facility]: [phone]      [facility]: [phone]      For urgent needs outside of business hours call the [facility] at [phone]        and ask for the dermatology resident on call      Proper skin care from [facility]:    -Eliminate harsh soaps as they strip the natural oils from the skin, often resulting in dry itchy skin ( i.e. Dial, Zest, [place] Spring)  -Use mild soaps such as Cetaphil or Dove Sensitive Skin in the shower. You do not need to use soap on arms, legs, and trunk every time you shower unless visibly soiled.   -Avoid hot or cold showers.  -After showering, lightly dry off and apply moisturizing within 2-3 minutes. This will help trap moisture in the skin.   -Aggressive use of a moisturizer at least 1-2 times a day to the entire body (including -Vanicream, Cetaphil, Aquaphor or Cerave) and moisturize hands after every washing.  -We recommend using moisturizers that come in a tub that needs to be scooped out, not a pump. This  has more of an oil base. It will hold moisture in your skin much better than a water base moisturizer. The above recommended are non-pore clogging.      Wear a sunscreen with at least SPF 30 on your face, ears, neck and V of the chest daily. Wear sunscreen on other areas of the body if those areas are exposed to the sun throughout the day. Sunscreens can contain physical and/or chemical blockers. Physical blockers are less likely to clog pores, these include zinc oxide and titanium dioxide. Reapply every two hour and after swimming.     Sunscreen examples: https://www.ewg.org/sunscreen/    UV radiation  UVA radiation remains constant throughout the day and throughout the year. It is a longer wavelength than UVB and therefore penetrates deeper into the skin leading to immediate and delayed tanning, photoaging, and skin cancer. 70-80% of UVA and UVB radiation occurs between the hours of 10am-2pm.  UVB radiation  UVB radiation causes the most harmful effects and is more significant during the summer months. However, snow and ice can reflect UVB radiation leading to skin damage during the winter months as well. UVB radiation is responsible for tanning, burning, inflammation, delayed erythema (pinkness), pigmentation (brown spots), and skin cancer.     I recommend self monthly full body exams and yearly full body exams with a dermatology provider. If you develop a new or changing lesion please follow up for examination. Most skin cancers are pink and scaly or pink and pearly. However, we do see blue/brown/black skin cancers.  Consider the ABCDEs of melanoma when giving yourself your monthly full body exam ( don't forget the groin, buttocks, feet, toes, etc). A-asymmetry, B-borders, C-color, D-diameter, E-elevation or evolving. If you see any of these changes please follow up in clinic. If you cannot see your back I recommend purchasing a hand held mirror to use with a larger wall mirror.       Checking for Skin  Cancer  You can find cancer early by checking your skin each month. There are 3 kinds of skin cancer. They are melanoma, basal cell carcinoma, and squamous cell carcinoma. Doing monthly skin checks is the best way to find new marks or skin changes. Follow the instructions below for checking your skin.   The ABCDEs of checking moles for melanoma   Check your moles or growths for signs of melanoma using ABCDE:   Asymmetry: the sides of the mole or growth don t match  Border: the edges are ragged, notched, or blurred  Color: the color within the mole or growth varies  Diameter: the mole or growth is larger than 6 mm (size of a pencil eraser)  Evolving: the size, shape, or color of the mole or growth is changing (evolving is not shown in the images below)    Checking for other types of skin cancer  Basal cell carcinoma or squamous cell carcinoma have symptoms such as:     A spot or mole that looks different from all other marks on your skin  Changes in how an area feels, such as itching, tenderness, or pain  Changes in the skin's surface, such as oozing, bleeding, or scaliness  A sore that does not heal  New swelling or redness beyond the border of a mole    Who s at risk?  Anyone can get skin cancer. But you are at greater risk if you have:   Fair skin, light-colored hair, or light-colored eyes  Many moles or abnormal moles on your skin  A history of sunburns from sunlight or tanning beds  A family history of skin cancer  A history of exposure to radiation or chemicals  A weakened immune system  If you have had skin cancer in the past, you are at risk for recurring skin cancer.   How to check your skin  Do your monthly skin checkups in front of a full-length mirror. Check all parts of your body, including your:   Head (ears, face, neck, and scalp)  Torso (front, back, and sides)  Arms (tops, undersides, upper, and lower armpits)  Hands (palms, backs, and fingers, including under the nails)  Buttocks and genitals  Legs  (front, back, and sides)  Feet (tops, soles, toes, including under the nails, and between toes)  If you have a lot of moles, take digital photos of them each month. Make sure to take photos both up close and from a distance. These can help you see if any moles change over time.   Most skin changes are not cancer. But if you see any changes in your skin, call your doctor right away. Only he or she can diagnose a problem. If you have skin cancer, seeing your doctor can be the first step toward getting the treatment that could save your life.   [employer] last reviewed this educational content on 4/1/2019 2000-2020 [address] [place] [address], [place] [address]. All rights reserved. This information is not intended as a substitute for professional medical care. Always follow your healthcare professional's instructions.       When should I call my doctor?  If you are worsening or not improving, please, contact us or seek urgent care as noted below.     Who should I call with questions (adults)?    [facility] [phone]  For urgent needs outside of business hours call the [facility] at [phone] and ask for the dermatology resident on call to be paged  If this is a medical emergency and you are unable to reach an ER, Call [phone]      If you need a prescription refill, please contact your pharmacy. Refills are approved or denied by our Physicians during normal business hours, Monday through Fridays  Per office policy, refills will not be granted if you have not been seen within the past year (or sooner depending on your child's condition)

## 2024-12-09 NOTE — PATIENT INSTRUCTIONS
Dear Shelley Mccrary    After reviewing your responses, I've been able to diagnose you with a urinary tract infection, which is a common infection of the bladder with bacteria.  This is not a sexually transmitted infection, though urinating immediately after intercourse can help prevent infections.  Drinking lots of fluids is also helpful to clear your current infection and prevent the next one.      I have sent a prescription for antibiotics to your pharmacy to treat this infection.    It is important that you take all of your prescribed medication even if your symptoms are improving after a few doses.  Taking all of your medicine helps prevent the symptoms from returning.     If your symptoms worsen, you develop pain in your back or stomach, develop fevers, or are not improving in 5 days, please contact your primary care provider for an appointment or visit any of our convenient Walk-in or Urgent Care Centers to be seen, which can be found on our website here.    Thanks again for choosing us as your health care partner,    Ben Ortiz MD

## 2024-12-10 NOTE — TELEPHONE ENCOUNTER
Spoke to patient and relayed provider message. Patient verbalized understanding of the message. No questions at this time.    Summer Ba RN

## 2024-12-10 NOTE — TELEPHONE ENCOUNTER
Cologuard is to be repeated every 3 years.  Her Cologuard test in 2023 was negative.      Ben Ortiz MD  [facility], [place]

## 2024-12-17 ENCOUNTER — OFFICE VISIT (OUTPATIENT)
Dept: CARDIOLOGY | Facility: CLINIC | Age: 74
End: 2024-12-17
Payer: COMMERCIAL

## 2024-12-17 VITALS
SYSTOLIC BLOOD PRESSURE: 118 MMHG | HEIGHT: 60 IN | HEART RATE: 74 BPM | BODY MASS INDEX: 26.03 KG/M2 | DIASTOLIC BLOOD PRESSURE: 64 MMHG

## 2024-12-17 DIAGNOSIS — I25.10 CORONARY ARTERY DISEASE INVOLVING NATIVE CORONARY ARTERY OF NATIVE HEART WITHOUT ANGINA PECTORIS: ICD-10-CM

## 2024-12-17 DIAGNOSIS — I10 BENIGN ESSENTIAL HYPERTENSION: ICD-10-CM

## 2024-12-17 DIAGNOSIS — E78.5 HYPERLIPIDEMIA LDL GOAL <70: Primary | ICD-10-CM

## 2024-12-17 DIAGNOSIS — I42.8 OTHER CARDIOMYOPATHY (H): ICD-10-CM

## 2024-12-17 RX ORDER — LISINOPRIL 5 MG/1
5 TABLET ORAL DAILY
Qty: 90 TABLET | Refills: 3 | Status: SHIPPED | OUTPATIENT
Start: 2024-12-17

## 2024-12-17 RX ORDER — ATORVASTATIN CALCIUM 40 MG/1
40 TABLET, FILM COATED ORAL DAILY
Qty: 90 TABLET | Refills: 3 | Status: SHIPPED | OUTPATIENT
Start: 2024-12-17

## 2024-12-17 RX ORDER — METOPROLOL SUCCINATE 25 MG/1
25 TABLET, EXTENDED RELEASE ORAL DAILY
Qty: 90 TABLET | Refills: 3 | Status: SHIPPED | OUTPATIENT
Start: 2024-12-17

## 2024-12-17 NOTE — LETTER
12/17/2024    [address], [address]  [facility] [address]    RE: Shelley AGUILAR Demetra       Dear Colleague,     I had the pleasure of seeing Shelley Mccrary in the [facility].  HPI and Plan:   I had the pleasure of seeing Shelley Mccrary in Cardiology Clinic in followup.      She is a 74-year-old female with past medical history inferolateral microinfarction July 2015 when she had an RCA intervention at [facility].  At that time she also had an angioplasty of the posterolateral branch.  Since then she has done well.  Lately she has been under quite of stress as several family members have had medical issues.  Oldest daughter has been diagnosed with cancer so has her .  She is going to [place] to visit with them and help them with their treatment.  In addition her dog had some medical issues also.  Last year, her younger daughter also had myocardial infarction.      From cardiac perspective, she has done well.  She denies any chest pain or shortness of breath.  She remains active.  Sometimes she has to take a nap during the day but she also takes Tylenol PM every night and because of her 's snoring, she sometimes despite the medication does not sleep well.      No history of orthopnea PND or edema feet.       Last test nuclear study in 2019 revealed inferolateral scar without ischemia.  Exercise capacity was excellent.  No chest pain or EKG changes at that time.  Last echo revealed inferior inferolateral hypokinesis with a globally normal ejection fraction.    Lipid profile in October revealed HDL of 64 and LDL of 79.  Potassium was 5.  Creatinine normal     PHYSICAL EXAMINATION:   See below      IMPRESSION AND PLAN:   1.  Coronary artery disease, status post inferior wall myocardial infarction in 07/2015.  She is now free of chest pain and shortness of breath.   Continue present medications including beta-blockers aspirin,  atorvastatin and lisinopril.  I refilled her medications today.     2. Hyperlipidemia  On atorvastatin 40 g daily LDL 79     3.  Hypertension on low-dose lisinopril     Overall, she is doing really well.      Recommend follow-up with me next year with a repeat echocardiogram prior to visit with me.        Sincerely,     LAINEY QUISPE MD      Today's clinic visit entailed:    32 minutes spent by me on the date of the encounter doing chart review, review of test results, patient visit, and documentation   Provider  Link to [facility] Help Grid           No orders of the defined types were placed in this encounter.      No orders of the defined types were placed in this encounter.      There are no discontinued medications.      No diagnosis found.    CURRENT MEDICATIONS:  Current Outpatient Medications   Medication Sig Dispense Refill     aspirin 81 MG tablet Take by mouth daily       atorvastatin (LIPITOR) 40 MG tablet Take 1 tablet (40 mg) by mouth daily. 90 tablet 3     cetirizine (ZYRTEC) 10 MG tablet Take 1 tablet (10 mg) by mouth every evening 30 tablet 0     clobetasol propionate (TEMOVATE) 0.05 % external cream Apply topically 2 times daily. 45 g 1     COMPOUNDED NON-CONTROLLED SUBSTANCE (CMPD RX) - PHARMACY TO MIX COMPOUNDED MEDICATION Apply to forehead, temples, cheeks, nose, and jawline twice daily for 5-7 days, wash hands after application. Avoid sun. Avoid the eyes and mouth. Keep away from pets. 30 g 1     diphenhydrAMINE HCl (BENADRYL PO) Take by mouth as needed        lisinopril (ZESTRIL) 5 MG tablet Take 1 tablet (5 mg) by mouth daily. 90 tablet 3     metoprolol succinate ER (TOPROL XL) 25 MG 24 hr tablet Take 1 tablet (25 mg) by mouth daily. 90 tablet 3     Multiple Vitamin (MULTI-VITAMINS) TABS Take 1 tablet by mouth daily        nitroGLYcerin (NITROSTAT) 0.4 MG sublingual tablet Place 1 tablet (0.4 mg) under the tongue every 5 minutes as needed for chest pain. If you are still having symptoms after 3  doses (15 minutes) call 911. 25 tablet 3     Omega-3 Fatty Acids (OMEGA-3 FISH OIL PO) Take 2 g by mouth daily       sertraline (ZOLOFT) 25 MG tablet Take 1 tablet (25 mg) by mouth daily. 90 tablet 3       ALLERGIES   No Known Allergies    PAST MEDICAL HISTORY:  Past Medical History:   Diagnosis Date     Anxiety     post MI     CAD (coronary artery disease) 2015     Depression     post MI     Depressive disorder      MRSA infection     hand      Myocardial infarction (H) 2015     Vertigo     related miners        PAST SURGICAL HISTORY:  Past Surgical History:   Procedure Laterality Date     ANGIOPLASTY  2015    MAURISIO to mid circ, thrombectomy -MAURISIO to proximal, mid, distal RCA , successful balloon to 1st RPL done Resolute stents used procedure done at [facility]     COLONOSCOPY       ENT SURGERY       INNER EAR SURGERY      redesign merritt faye of miners diseas      ORTHOPEDIC SURGERY      Broken ankle       FAMILY HISTORY:  Family History   Problem Relation Age of Onset     Coronary Artery Disease Father         at age 39, and other heart problem      Hyperlipidemia Father         ?      Hypertension Mother      Diabetes No family hx of      Cerebrovascular Disease No family hx of      Breast Cancer No family hx of      Colon Cancer No family hx of        SOCIAL HISTORY:  Social History     Socioeconomic History     Marital status:    Tobacco Use     Smoking status: Former     Current packs/day: 0.00     Average packs/day: 0.5 packs/day for 10.0 years (5.0 ttl pk-yrs)     Types: Cigarettes     Start date: 1980     Quit date: 1990     Years since quittin.9     Smokeless tobacco: Never     Tobacco comments:     smoked 1-2 pack week, 30 years   Vaping Use     Vaping status: Never Used   Substance and Sexual Activity     Alcohol use: No     Drug use: Never     Sexual activity: Not Currently     Partners: Male   Other Topics Concern     Parent/sibling w/ CABG, MI or angioplasty  before 65F 55M? Yes     Caffeine Concern No     Sleep Concern No     Weight Concern No     Special Diet Yes     Comment: low fats     Exercise Yes     Comment: walking  miles day, swimming, ellyptical     Seat Belt Yes   Social History Narrative    , 2 kids, non smoker quit 22 yrs ago. No alcohol social occasional , works for a company to help elderly patient      Social Drivers of Health     Financial Resource Strain: Low Risk  (9/30/2024)    Financial Resource Strain      Within the past 12 months, have you or your family members you live with been unable to get utilities (heat, electricity) when it was really needed?: No   Food Insecurity: Low Risk  (9/30/2024)    Food Insecurity      Within the past 12 months, did you worry that your food would run out before you got money to buy more?: No      Within the past 12 months, did the food you bought just not last and you didn t have money to get more?: No   Transportation Needs: Low Risk  (9/30/2024)    Transportation Needs      Within the past 12 months, has lack of transportation kept you from medical appointments, getting your medicines, non-medical meetings or appointments, work, or from getting things that you need?: No   Physical Activity: Sufficiently Active (9/30/2024)    Exercise Vital Sign      Days of Exercise per Week: 7 days      Minutes of Exercise per Session: 30 min   Stress: Stress Concern Present (9/30/2024)    [employer] [place] [employer] - Occupational Stress Questionnaire      Feeling of Stress : To some extent   Social Connections: Unknown (9/30/2024)    Social Connection and Isolation Panel [NHANES]      Frequency of Social Gatherings with Friends and Family: More than three times a week   Interpersonal Safety: Low Risk  (9/28/2023)    Interpersonal Safety      Do you feel physically and emotionally safe where you currently live?: Yes      Within the past 12 months, have you been hit, slapped, kicked or otherwise physically  hurt by someone?: No      Within the past 12 months, have you been humiliated or emotionally abused in other ways by your partner or ex-partner?: No   Housing Stability: Low Risk  (9/30/2024)    Housing Stability      Do you have housing? : Yes      Are you worried about losing your housing?: No       Review of Systems:  Skin:          Eyes:         ENT:         Respiratory:          Cardiovascular:         Gastroenterology:        Genitourinary:         Musculoskeletal:         Neurologic:         Psychiatric:         Heme/Lymph/Imm:         Endocrine:           Physical Exam:  Vitals: There were no vitals taken for this visit.        CC  No referring provider defined for this encounter.                  Thank you for allowing me to participate in the care of your patient.      Sincerely,     Salty Lovett MD     [facility]  cc:   No referring provider defined for this encounter.

## 2024-12-17 NOTE — PROGRESS NOTES
HPI and Plan:   I had the pleasure of seeing Shelley Mccrary in Cardiology Clinic in followup.      She is a 74-year-old female with past medical history inferolateral microinfarction July 2015 when she had an RCA intervention at [facility].  At that time she also had an angioplasty of the posterolateral branch.  Since then she has done well.  Lately she has been under quite of stress as several family members have had medical issues.  Oldest daughter has been diagnosed with cancer so has her .  She is going to [place] to visit with them and help them with their treatment.  In addition her dog had some medical issues also.  Last year, her younger daughter also had myocardial infarction.      From cardiac perspective, she has done well.  She denies any chest pain or shortness of breath.  She remains active.  Sometimes she has to take a nap during the day but she also takes Tylenol PM every night and because of her 's snoring, she sometimes despite the medication does not sleep well.      No history of orthopnea PND or edema feet.       Last test nuclear study in 2019 revealed inferolateral scar without ischemia.  Exercise capacity was excellent.  No chest pain or EKG changes at that time.  Last echo revealed inferior inferolateral hypokinesis with a globally normal ejection fraction.    Lipid profile in October revealed HDL of 64 and LDL of 79.  Potassium was 5.  Creatinine normal     PHYSICAL EXAMINATION:   See below      IMPRESSION AND PLAN:   1.  Coronary artery disease, status post inferior wall myocardial infarction in 07/2015.  She is now free of chest pain and shortness of breath.   Continue present medications including beta-blockers aspirin, atorvastatin and lisinopril.  I refilled her medications today.     2. Hyperlipidemia  On atorvastatin 40 g daily LDL 79     3.  Hypertension on low-dose lisinopril     Overall, she is doing really well.      Recommend follow-up with me next year with a  repeat echocardiogram prior to visit with me.        Sincerely,     LAINEY QUISPE MD      Today's clinic visit entailed:    32 minutes spent by me on the date of the encounter doing chart review, review of test results, patient visit, and documentation   Provider  Link to [facility] Help Grid           No orders of the defined types were placed in this encounter.      No orders of the defined types were placed in this encounter.      There are no discontinued medications.      No diagnosis found.    CURRENT MEDICATIONS:  Current Outpatient Medications   Medication Sig Dispense Refill    aspirin 81 MG tablet Take by mouth daily      atorvastatin (LIPITOR) 40 MG tablet Take 1 tablet (40 mg) by mouth daily. 90 tablet 3    cetirizine (ZYRTEC) 10 MG tablet Take 1 tablet (10 mg) by mouth every evening 30 tablet 0    clobetasol propionate (TEMOVATE) 0.05 % external cream Apply topically 2 times daily. 45 g 1    COMPOUNDED NON-CONTROLLED SUBSTANCE (CMPD RX) - PHARMACY TO MIX COMPOUNDED MEDICATION Apply to forehead, temples, cheeks, nose, and jawline twice daily for 5-7 days, wash hands after application. Avoid sun. Avoid the eyes and mouth. Keep away from pets. 30 g 1    diphenhydrAMINE HCl (BENADRYL PO) Take by mouth as needed       lisinopril (ZESTRIL) 5 MG tablet Take 1 tablet (5 mg) by mouth daily. 90 tablet 3    metoprolol succinate ER (TOPROL XL) 25 MG 24 hr tablet Take 1 tablet (25 mg) by mouth daily. 90 tablet 3    Multiple Vitamin (MULTI-VITAMINS) TABS Take 1 tablet by mouth daily       nitroGLYcerin (NITROSTAT) 0.4 MG sublingual tablet Place 1 tablet (0.4 mg) under the tongue every 5 minutes as needed for chest pain. If you are still having symptoms after 3 doses (15 minutes) call 911. 25 tablet 3    Omega-3 Fatty Acids (OMEGA-3 FISH OIL PO) Take 2 g by mouth daily      sertraline (ZOLOFT) 25 MG tablet Take 1 tablet (25 mg) by mouth daily. 90 tablet 3       ALLERGIES   No Known Allergies    PAST MEDICAL HISTORY:  Past  Medical History:   Diagnosis Date    Anxiety     post MI    CAD (coronary artery disease) 2015    Depression     post MI    Depressive disorder     MRSA infection     hand     Myocardial infarction (H) 2015    Vertigo     related miners        PAST SURGICAL HISTORY:  Past Surgical History:   Procedure Laterality Date    ANGIOPLASTY  2015    MAURISIO to mid circ, thrombectomy -MAURISIO to proximal, mid, distal RCA , successful balloon to 1st RPL done Resolute stents used procedure done at [facility]    COLONOSCOPY      ENT SURGERY      INNER EAR SURGERY      redesign roxann nieto bc of miners diseas     ORTHOPEDIC SURGERY      Broken ankle       FAMILY HISTORY:  Family History   Problem Relation Age of Onset    Coronary Artery Disease Father         at age 39, and other heart problem     Hyperlipidemia Father         ?     Hypertension Mother     Diabetes No family hx of     Cerebrovascular Disease No family hx of     Breast Cancer No family hx of     Colon Cancer No family hx of        SOCIAL HISTORY:  Social History     Socioeconomic History    Marital status:    Tobacco Use    Smoking status: Former     Current packs/day: 0.00     Average packs/day: 0.5 packs/day for 10.0 years (5.0 ttl pk-yrs)     Types: Cigarettes     Start date: 1980     Quit date: 1990     Years since quittin.9    Smokeless tobacco: Never    Tobacco comments:     smoked 1-2 pack week, 30 years   Vaping Use    Vaping status: Never Used   Substance and Sexual Activity    Alcohol use: No    Drug use: Never    Sexual activity: Not Currently     Partners: Male   Other Topics Concern    Parent/sibling w/ CABG, MI or angioplasty before 65F 55M? Yes    Caffeine Concern No    Sleep Concern No    Weight Concern No    Special Diet Yes     Comment: low fats    Exercise Yes     Comment: walking  miles day, swimming, ellyptical    Seat Belt Yes   Social History Narrative    , 2 kids, non smoker quit 22 yrs ago. No  alcohol social occasional , works for a company to help elderly patient      Social Drivers of Health     Financial Resource Strain: Low Risk  (9/30/2024)    Financial Resource Strain     Within the past 12 months, have you or your family members you live with been unable to get utilities (heat, electricity) when it was really needed?: No   Food Insecurity: Low Risk  (9/30/2024)    Food Insecurity     Within the past 12 months, did you worry that your food would run out before you got money to buy more?: No     Within the past 12 months, did the food you bought just not last and you didn t have money to get more?: No   Transportation Needs: Low Risk  (9/30/2024)    Transportation Needs     Within the past 12 months, has lack of transportation kept you from medical appointments, getting your medicines, non-medical meetings or appointments, work, or from getting things that you need?: No   Physical Activity: Sufficiently Active (9/30/2024)    Exercise Vital Sign     Days of Exercise per Week: 7 days     Minutes of Exercise per Session: 30 min   Stress: Stress Concern Present (9/30/2024)    [employer] [place] [employer] - Occupational Stress Questionnaire     Feeling of Stress : To some extent   Social Connections: Unknown (9/30/2024)    Social Connection and Isolation Panel [NHANES]     Frequency of Social Gatherings with Friends and Family: More than three times a week   Interpersonal Safety: Low Risk  (9/28/2023)    Interpersonal Safety     Do you feel physically and emotionally safe where you currently live?: Yes     Within the past 12 months, have you been hit, slapped, kicked or otherwise physically hurt by someone?: No     Within the past 12 months, have you been humiliated or emotionally abused in other ways by your partner or ex-partner?: No   Housing Stability: Low Risk  (9/30/2024)    Housing Stability     Do you have housing? : Yes     Are you worried about losing your housing?: No       Review  of Systems:  Skin:          Eyes:         ENT:         Respiratory:          Cardiovascular:         Gastroenterology:        Genitourinary:         Musculoskeletal:         Neurologic:         Psychiatric:         Heme/Lymph/Imm:         Endocrine:           Physical Exam:  Vitals: There were no vitals taken for this visit.        CC  No referring provider defined for this encounter.

## 2025-04-28 ASSESSMENT — PATIENT HEALTH QUESTIONNAIRE - PHQ9
SUM OF ALL RESPONSES TO PHQ QUESTIONS 1-9: 4
SUM OF ALL RESPONSES TO PHQ QUESTIONS 1-9: 4
10. IF YOU CHECKED OFF ANY PROBLEMS, HOW DIFFICULT HAVE THESE PROBLEMS MADE IT FOR YOU TO DO YOUR WORK, TAKE CARE OF THINGS AT HOME, OR GET ALONG WITH OTHER PEOPLE: NOT DIFFICULT AT ALL

## 2025-04-29 ENCOUNTER — OFFICE VISIT (OUTPATIENT)
Dept: INTERNAL MEDICINE | Facility: CLINIC | Age: 75
End: 2025-04-29
Payer: COMMERCIAL

## 2025-04-29 ENCOUNTER — ANCILLARY PROCEDURE (OUTPATIENT)
Dept: GENERAL RADIOLOGY | Facility: CLINIC | Age: 75
End: 2025-04-29
Attending: PHYSICIAN ASSISTANT
Payer: COMMERCIAL

## 2025-04-29 VITALS
BODY MASS INDEX: 26.7 KG/M2 | SYSTOLIC BLOOD PRESSURE: 139 MMHG | OXYGEN SATURATION: 99 % | DIASTOLIC BLOOD PRESSURE: 77 MMHG | TEMPERATURE: 97.9 F | WEIGHT: 136 LBS | HEART RATE: 69 BPM | RESPIRATION RATE: 16 BRPM | HEIGHT: 60 IN

## 2025-04-29 DIAGNOSIS — R05.2 SUBACUTE COUGH: ICD-10-CM

## 2025-04-29 DIAGNOSIS — J98.01 BRONCHOSPASM: Primary | ICD-10-CM

## 2025-04-29 PROCEDURE — 3078F DIAST BP <80 MM HG: CPT | Performed by: PHYSICIAN ASSISTANT

## 2025-04-29 PROCEDURE — 99213 OFFICE O/P EST LOW 20 MIN: CPT | Performed by: PHYSICIAN ASSISTANT

## 2025-04-29 PROCEDURE — 71046 X-RAY EXAM CHEST 2 VIEWS: CPT | Mod: TC | Performed by: RADIOLOGY

## 2025-04-29 PROCEDURE — 3075F SYST BP GE 130 - 139MM HG: CPT | Performed by: PHYSICIAN ASSISTANT

## 2025-04-29 RX ORDER — ALBUTEROL SULFATE 90 UG/1
2 INHALANT RESPIRATORY (INHALATION) EVERY 6 HOURS PRN
Qty: 18 G | Refills: 0 | Status: SHIPPED | OUTPATIENT
Start: 2025-04-29

## 2025-04-29 RX ORDER — BENZONATATE 200 MG/1
200 CAPSULE ORAL 3 TIMES DAILY PRN
Qty: 30 CAPSULE | Refills: 0 | Status: SHIPPED | OUTPATIENT
Start: 2025-04-29

## 2025-04-29 RX ORDER — PREDNISONE 20 MG/1
20 TABLET ORAL DAILY
Qty: 7 TABLET | Refills: 0 | Status: SHIPPED | OUTPATIENT
Start: 2025-04-29 | End: 2025-05-06

## 2025-04-29 ASSESSMENT — ANXIETY QUESTIONNAIRES
7. FEELING AFRAID AS IF SOMETHING AWFUL MIGHT HAPPEN: NEARLY EVERY DAY
5. BEING SO RESTLESS THAT IT IS HARD TO SIT STILL: SEVERAL DAYS
IF YOU CHECKED OFF ANY PROBLEMS ON THIS QUESTIONNAIRE, HOW DIFFICULT HAVE THESE PROBLEMS MADE IT FOR YOU TO DO YOUR WORK, TAKE CARE OF THINGS AT HOME, OR GET ALONG WITH OTHER PEOPLE: SOMEWHAT DIFFICULT
GAD7 TOTAL SCORE: 16
6. BECOMING EASILY ANNOYED OR IRRITABLE: NEARLY EVERY DAY
3. WORRYING TOO MUCH ABOUT DIFFERENT THINGS: NEARLY EVERY DAY
GAD7 TOTAL SCORE: 16
GAD7 TOTAL SCORE: 16
4. TROUBLE RELAXING: NOT AT ALL
2. NOT BEING ABLE TO STOP OR CONTROL WORRYING: NEARLY EVERY DAY
7. FEELING AFRAID AS IF SOMETHING AWFUL MIGHT HAPPEN: NEARLY EVERY DAY
1. FEELING NERVOUS, ANXIOUS, OR ON EDGE: NEARLY EVERY DAY
8. IF YOU CHECKED OFF ANY PROBLEMS, HOW DIFFICULT HAVE THESE MADE IT FOR YOU TO DO YOUR WORK, TAKE CARE OF THINGS AT HOME, OR GET ALONG WITH OTHER PEOPLE?: SOMEWHAT DIFFICULT

## 2025-04-29 NOTE — PROGRESS NOTES
"  Assessment & Plan     Bronchospasm    - XR Chest 2 Views  - predniSONE (DELTASONE) 20 MG tablet; Take 1 tablet (20 mg) by mouth daily for 7 days.  - albuterol (PROAIR HFA/PROVENTIL HFA/VENTOLIN HFA) 108 (90 Base) MCG/ACT inhaler; Inhale 2 puffs into the lungs every 6 hours as needed for cough.  - benzonatate (TESSALON) 200 MG capsule; Take 1 capsule (200 mg) by mouth 3 times daily as needed for cough.    Subacute cough        Reviewed results via my chart  Meds as above  Recheck in 2-3 weeks if not improving      BMI  Estimated body mass index is 26.22 kg/m  as calculated from the following:    Height as of this encounter: 1.534 m (5' 0.39\").    Weight as of this encounter: 61.7 kg (136 lb).             Subjective   Shelley is a 75 year old, presenting for the following health issues:  Cough  Cold march 15- has not gone away   Thinks that its lisinopril   Was told she could stop taking the medication wants to make sure that its still okay to stop   April 7 got back from [place]     History of Present Illness       Reason for visit:  Cough  Symptom onset:  More than a month  Symptom intensity:  Moderate  Symptom progression:  Staying the same  Had these symptoms before:  No  What makes it worse:  No  What makes it better:  No   She is taking medications regularly.        RESPIRATORY SYMPTOMS    Duration: 6 weeks    Description  nasal congestion, rhinorrhea, and cough mostly dry  Severity: moderate  Accompanying signs and symptoms: None  History (predisposing factors):  none  Precipitating or alleviating factors: None  Therapies tried and outcome:  none                     Objective    /77   Pulse 69   Temp 97.9  F (36.6  C) (Temporal)   Resp 16   Ht 1.534 m (5' 0.39\")   Wt 61.7 kg (136 lb)   SpO2 99%   BMI 26.22 kg/m    Body mass index is 26.22 kg/m .  Physical Exam   GENERAL: alert and no distress  HENT: normal cephalic/atraumatic, ear canals and TM's normal, nasal mucosa edematous , rhinorrhea clear, " oropharynx clear, and oral mucous membranes moist  NECK: no adenopathy, no asymmetry, masses, or scars  RESP: lungs clear to auscultation - no rales, rhonchi or wheezes  CV: regular rates and rhythm and normal S1 S2, no S3 or S4  MS: no gross musculoskeletal defects noted, no edema    XR Chest 2 Views    Result Date: 4/29/2025  EXAM: XR CHEST 2 VIEWS LOCATION: [facility] DATE: 4/29/2025 INDICATION:  Bronchospasm COMPARISON: 08/09/2015     IMPRESSION: 1.  No acute cardiopulmonary process. 2.  Normal cardiomediastinal silhouette.         Signed Electronically by: Eliza Desai PA-C

## 2025-05-22 ENCOUNTER — OFFICE VISIT (OUTPATIENT)
Dept: INTERNAL MEDICINE | Facility: CLINIC | Age: 75
End: 2025-05-22
Payer: COMMERCIAL

## 2025-05-22 VITALS
SYSTOLIC BLOOD PRESSURE: 135 MMHG | BODY MASS INDEX: 26.22 KG/M2 | WEIGHT: 136 LBS | OXYGEN SATURATION: 97 % | HEART RATE: 83 BPM | DIASTOLIC BLOOD PRESSURE: 85 MMHG | RESPIRATION RATE: 16 BRPM | TEMPERATURE: 97 F

## 2025-05-22 DIAGNOSIS — H61.23 BILATERAL IMPACTED CERUMEN: ICD-10-CM

## 2025-05-22 DIAGNOSIS — A37.90 PERTUSSIS: Primary | ICD-10-CM

## 2025-05-22 DIAGNOSIS — I10 BENIGN ESSENTIAL HYPERTENSION: ICD-10-CM

## 2025-05-22 DIAGNOSIS — R05.2 SUBACUTE COUGH: ICD-10-CM

## 2025-05-22 RX ORDER — BENZONATATE 200 MG/1
200 CAPSULE ORAL 3 TIMES DAILY PRN
Qty: 30 CAPSULE | Refills: 0 | Status: SHIPPED | OUTPATIENT
Start: 2025-05-22

## 2025-05-22 RX ORDER — AZITHROMYCIN 250 MG/1
TABLET, FILM COATED ORAL
Qty: 6 TABLET | Refills: 0 | Status: SHIPPED | OUTPATIENT
Start: 2025-05-22 | End: 2025-05-27

## 2025-05-22 ASSESSMENT — PAIN SCALES - GENERAL: PAINLEVEL_OUTOF10: NO PAIN (0)

## 2025-05-22 NOTE — PROGRESS NOTES
"  Assessment & Plan     Pertussis  Pt was seen on 4/29/2025 for a cough that she has had since March 15 and has not resolved. Pt's TDAP was last done on 8/17/2010. Pt had stopped taking her lisinopril thinking that was the culprit for her persistent cough.  - B. pertussis/parapertussis PCR-NP; Future  - azithromycin (ZITHROMAX) 250 MG tablet; Take 2 tablets (500 mg) by mouth daily for 1 day, THEN 1 tablet (250 mg) daily for 4 days.    Benign essential hypertension  Pt takes metoprolol and stopped taking Lisinopril thinking it was the culprit to the unproductive persistent cough. Explained to pt that if her BP increases, losartan would be the medication that will be prescribed.     Subacute cough  Pt was seen on 4/29/2025 for a cough that she has had since March 15 and has not resolved. Pt reports that her cough is ultimately dry but with some secretions intermittently. Feels like a constant tickle.  - benzonatate (TESSALON) 200 MG capsule; Take 1 capsule (200 mg) by mouth 3 times daily as needed for cough.    Bilateral impacted cerumen  Both ears impacted with cerumen. Debrox instilled, both ears irrigated, and cerumen removed with instrument  - REMOVE IMPACTED CERUMEN          BMI  Estimated body mass index is 26.22 kg/m  as calculated from the following:    Height as of 4/29/25: 1.534 m (5' 0.39\").    Weight as of this encounter: 61.7 kg (136 lb).             Cristiano Rosa is a 75 year old, presenting for the following health issues:  No chief complaint on file.    History of Present Illness       Reason for visit:  Follow up cough now plugged ear    She eats 2-3 servings of fruits and vegetables daily.She consumes 0 sweetened beverage(s) daily.She exercises with enough effort to increase her heart rate 60 or more minutes per day.  She exercises with enough effort to increase her heart rate 7 days per week.   She is taking medications regularly.      Pt was seen on 4/29/2025 for a cough that she has had since " March 15.  She reported nasal congestion, rhinorrhea, and unproductive cough. Pt was prescribed prednisone, Tessalon Perles and albuterol inhaler.     Pt's TDAP was last done on 8/17/2010. Pt had stopped taking her lisinopril thinking that was the culprit for her persistent cough. BP today is 135/85. Explained to pt that if her BP increases, losartan would be the medication that will be prescribed. Pt reports that her cough is ultimately dry but with some secretions intermittently. Feels like a constant tickle. Pt reports intermittent rhinorrhea that doesn't seem associated with her cough. Lying supine or exertion does not trigger a cough.            Review of Systems  Constitutional, neuro, ENT, endocrine, pulmonary, cardiac, gastrointestinal, genitourinary, musculoskeletal, integument and psychiatric systems are negative, except as otherwise noted.      Objective    There were no vitals taken for this visit.  There is no height or weight on file to calculate BMI.  Physical Exam   GENERAL: alert and no distress  HENT: normal cephalic/atraumatic, right ear: normal: no effusions, no erythema, normal landmarks and occluded with wax, left ear: normal: no effusions, no erythema, normal landmarks and occluded with wax, nose and mouth without ulcers or lesions, rhinorrhea clear, oral mucous membranes moist, and tonsillar erythema  NECK: no adenopathy, no asymmetry, masses, or scars  NECK: bilateral anterior cervical adenopathy, no asymmetry, masses, or scars, and thyroid normal to palpation  RESP: lungs clear to auscultation - no rales, rhonchi or wheezes  CV: regular rate and rhythm, normal S1 S2, no S3 or S4, no murmur, click or rub, no peripheral edema  ABDOMEN: soft, nontender, no hepatosplenomegaly, no masses and bowel sounds normal  MS: no gross musculoskeletal defects noted, no edema  SKIN: no suspicious lesions or rashes  NEURO: Normal strength and tone, mentation intact and speech normal  PSYCH: mentation appears  normal, affect normal/bright            Signed Electronically by: ALEJANDRA Jacobs CNP

## 2025-05-23 ENCOUNTER — RESULTS FOLLOW-UP (OUTPATIENT)
Dept: INTERNAL MEDICINE | Facility: CLINIC | Age: 75
End: 2025-05-23

## 2025-06-26 ENCOUNTER — OFFICE VISIT (OUTPATIENT)
Dept: INTERNAL MEDICINE | Facility: CLINIC | Age: 75
End: 2025-06-26
Payer: COMMERCIAL

## 2025-06-26 VITALS
WEIGHT: 136 LBS | RESPIRATION RATE: 16 BRPM | HEIGHT: 60 IN | OXYGEN SATURATION: 97 % | SYSTOLIC BLOOD PRESSURE: 123 MMHG | BODY MASS INDEX: 26.7 KG/M2 | HEART RATE: 76 BPM | TEMPERATURE: 98 F | DIASTOLIC BLOOD PRESSURE: 74 MMHG

## 2025-06-26 DIAGNOSIS — M54.2 NECK PAIN: ICD-10-CM

## 2025-06-26 DIAGNOSIS — R05.2 SUBACUTE COUGH: Primary | ICD-10-CM

## 2025-06-26 DIAGNOSIS — J32.9 CHRONIC SINUSITIS, UNSPECIFIED LOCATION: ICD-10-CM

## 2025-06-26 RX ORDER — GUAIFENESIN AND DEXTROMETHORPHAN HYDROBROMIDE 600; 30 MG/1; MG/1
1 TABLET, EXTENDED RELEASE ORAL EVERY 12 HOURS
Qty: 60 TABLET | Refills: 0 | Status: SHIPPED | OUTPATIENT
Start: 2025-06-26

## 2025-06-26 RX ORDER — LORATADINE 10 MG/1
10 TABLET ORAL DAILY
Qty: 60 TABLET | Refills: 0 | Status: SHIPPED | OUTPATIENT
Start: 2025-06-26

## 2025-06-26 RX ORDER — FLUTICASONE PROPIONATE 50 MCG
1 SPRAY, SUSPENSION (ML) NASAL DAILY
Qty: 11.1 ML | Refills: 0 | Status: SHIPPED | OUTPATIENT
Start: 2025-06-26

## 2025-06-26 ASSESSMENT — PAIN SCALES - GENERAL: PAINLEVEL_OUTOF10: NO PAIN (0)

## 2025-06-26 NOTE — PROGRESS NOTES
"  Assessment & Plan     Subacute cough  Today pt states that her children pushed her to be seen for her constant \"throat clearing\" that is lingering and persistent throughout the day.   - dextromethorphan-guaiFENesin (MUCINEX DM)  MG 12 hr tablet; Take 1 tablet by mouth every 12 hours.    Chronic sinusitis, unspecified location   Pt endorses some rhinitis, sneezing and PND and wants something to help resolve the lingering  symptoms.  - dextromethorphan-guaiFENesin (MUCINEX DM)  MG 12 hr tablet; Take 1 tablet by mouth every 12 hours.  - fluticasone (FLONASE) 50 MCG/ACT nasal spray; Spray 1 spray into both nostrils daily.  - loratadine (CLARITIN) 10 MG tablet; Take 1 tablet (10 mg) by mouth daily.    Neck pain   Pt states that she finds herself in a head forward position persistently with subsequent neck pain. Discussed doing ROM exercises to help resolve the discomfort. Pt states that left lateral bending and left rotation reproduces neck pain.      BMI  Estimated body mass index is 26.22 kg/m  as calculated from the following:    Height as of this encounter: 1.534 m (5' 0.39\").    Weight as of this encounter: 61.7 kg (136 lb).             Cristiano Rosa is a 75 year old, presenting for the following health issues:  Sinus Problem (Ongoing after having a cough 2 months ago)      6/26/2025    11:07 AM   Additional Questions   Roomed by Malia Xiao   Accompanied by self         6/26/2025    11:07 AM   Patient Reported Additional Medications   Patient reports taking the following new medications no     Sinus Problem     History of Present Illness       Reason for visit:  Sinus problem    She eats 2-3 servings of fruits and vegetables daily.She consumes 0 sweetened beverage(s) daily.She exercises with enough effort to increase her heart rate 30 to 60 minutes per day.  She exercises with enough effort to increase her heart rate 7 days per week.   She is taking medications regularly.    Pt was seen " "5/22/2025 for cough that she has had since March and not resolved. TDAP was last done on 8/17/2010. Tested her for Pertussis which was negative. Prescribed pt azithromycin which she said helped significantly. Benzonatate was prescribed but her insurance did not cover it.    Today pt states that her children pushed her to be seen for her constant \"throat clearing\" that is lingering and persistent throughout the day. Pt endorses some rhinitis and PND and wants something to help resolves the lingering  symptoms.    Pt states that she finds herself in a head forward position persistently with subsequent neck pain. Discussed doing ROM exercises to help resolve the discomfort. Pt states that left lateral bending and left rotation reproduces neck pain.              Review of Systems  Constitutional, neuro, ENT, endocrine, pulmonary, cardiac, gastrointestinal, genitourinary, musculoskeletal, integument and psychiatric systems are negative, except as otherwise noted.      Objective    There were no vitals taken for this visit.  There is no height or weight on file to calculate BMI.  Physical Exam   GENERAL: alert and no distress  HENT: normal cephalic/atraumatic, right ear: normal: no effusions, no erythema, normal landmarks, left ear: normal: no effusions, no erythema, normal landmarks, nose and mouth without ulcers or lesions, oropharynx clear, oral mucous membranes moist, tonsillar erythema, and cobblestone oropharynx  NECK: bilateral shotty anterior cervical adenopathy, no asymmetry, masses, or scars, and thyroid normal to palpation  RESP: lungs clear to auscultation - no rales, rhonchi or wheezes  CV: regular rate and rhythm, normal S1 S2, no S3 or S4, no murmur, click or rub, no peripheral edema  ORTHO: cervical: full extension and flexion with no discomfort, discomfort to right paracervical muscles upon palpation, discomfort reproduced with left lateral bend and left rotation  ABDOMEN: soft, nontender, no " hepatosplenomegaly, no masses and bowel sounds normal  MS: no gross musculoskeletal defects noted, no edema  SKIN: no suspicious lesions or rashes  NEURO: Normal strength and tone, mentation intact and speech normal  PSYCH: mentation appears normal, affect normal/bright            Signed Electronically by: ALEJANDRA Jacobs CNP

## 2025-06-26 NOTE — PATIENT INSTRUCTIONS
Mucinex DM...guaifenesin- dextromethorphan    Flonase: 1 spray into each nostril daily for nasal congestion.    Claritin for allergy symptoms, if not effective try cetirizine (Zyrtec)

## 2025-08-24 DIAGNOSIS — J32.9 CHRONIC SINUSITIS, UNSPECIFIED LOCATION: ICD-10-CM

## 2025-08-26 RX ORDER — FLUTICASONE PROPIONATE 50 MCG
1 SPRAY, SUSPENSION (ML) NASAL DAILY
Qty: 16 G | Refills: 0 | Status: SHIPPED | OUTPATIENT
Start: 2025-08-26

## 2025-09-03 ENCOUNTER — PATIENT OUTREACH (OUTPATIENT)
Dept: CARE COORDINATION | Facility: CLINIC | Age: 75
End: 2025-09-03
Payer: COMMERCIAL